# Patient Record
Sex: MALE | Race: WHITE | Employment: OTHER | URBAN - METROPOLITAN AREA
[De-identification: names, ages, dates, MRNs, and addresses within clinical notes are randomized per-mention and may not be internally consistent; named-entity substitution may affect disease eponyms.]

---

## 2017-09-21 ENCOUNTER — HOSPITAL ENCOUNTER (OUTPATIENT)
Dept: ULTRASOUND IMAGING | Age: 67
Discharge: HOME | End: 2017-09-21
Attending: INTERNAL MEDICINE
Payer: MEDICARE

## 2017-09-21 ENCOUNTER — HOSPITAL ENCOUNTER (OUTPATIENT)
Dept: CT IMAGING | Age: 67
Discharge: HOME | End: 2017-09-21
Attending: INTERNAL MEDICINE
Payer: MEDICARE

## 2017-09-21 DIAGNOSIS — Z87.891 HISTORY OF TOBACCO USE: ICD-10-CM

## 2017-09-21 PROCEDURE — 93978 VASCULAR STUDY: CPT

## 2017-09-21 PROCEDURE — G0297 LDCT FOR LUNG CA SCREEN: HCPCS

## 2017-10-09 ENCOUNTER — HOSPITAL ENCOUNTER (OUTPATIENT)
Dept: CT IMAGING | Age: 67
End: 2017-10-09
Attending: INTERNAL MEDICINE
Payer: MEDICARE

## 2017-10-09 ENCOUNTER — HOSPITAL ENCOUNTER (OUTPATIENT)
Dept: MRI IMAGING | Age: 67
Discharge: HOME | End: 2017-10-09
Attending: INTERNAL MEDICINE
Payer: MEDICARE

## 2017-10-09 DIAGNOSIS — K76.9 LIVER LESION: ICD-10-CM

## 2017-10-09 LAB — CREAT BLD-MCNC: 1.3 MG/DL (ref 0.8–1.5)

## 2017-10-09 PROCEDURE — 74011000258 HC RX REV CODE- 258

## 2017-10-09 PROCEDURE — 82565 ASSAY OF CREATININE: CPT

## 2017-10-09 PROCEDURE — 74183 MRI ABD W/O CNTR FLWD CNTR: CPT

## 2017-10-09 PROCEDURE — A9577 INJ MULTIHANCE: HCPCS

## 2017-10-09 PROCEDURE — 74011250636 HC RX REV CODE- 250/636

## 2017-10-09 RX ORDER — SODIUM CHLORIDE 0.9 % (FLUSH) 0.9 %
10 SYRINGE (ML) INJECTION
Status: COMPLETED | OUTPATIENT
Start: 2017-10-09 | End: 2017-10-09

## 2017-10-09 RX ADMIN — Medication 10 ML: at 08:09

## 2017-10-09 RX ADMIN — GADOBENATE DIMEGLUMINE 10 ML: 529 INJECTION, SOLUTION INTRAVENOUS at 08:08

## 2017-10-09 RX ADMIN — SODIUM CHLORIDE 100 ML: 900 INJECTION, SOLUTION INTRAVENOUS at 08:09

## 2017-12-27 PROBLEM — F33.9 RECURRENT DEPRESSION (HCC): Status: ACTIVE | Noted: 2017-12-27

## 2018-01-04 ENCOUNTER — HOSPITAL ENCOUNTER (OUTPATIENT)
Dept: SURGERY | Age: 68
Discharge: HOME | End: 2018-01-04

## 2018-01-05 VITALS — WEIGHT: 210 LBS | HEIGHT: 72 IN | BODY MASS INDEX: 28.44 KG/M2

## 2018-01-05 RX ORDER — ACETAMINOPHEN/DIPHENHYDRAMINE 500MG-25MG
1 TABLET ORAL
COMMUNITY
End: 2019-04-25 | Stop reason: ALTCHOICE

## 2018-01-05 NOTE — PERIOP NOTES
Patient verified name, , and surgery as listed in [facility]. Type 2 surgery, PAT phone assessment complete. Orders not received. Labs per surgeon: no orders. Labs per anesthesia protocol: Hgb and EKG. Pt instructed to come to entrance B on 17 at his convenience from [address] [place] [address] to have lab work drawn and an EKG. Pt is followed by Dr. Jeremy Hubbard MD for CAD. Last office note from 17 placed on chart for anesthesia reference if needed. Patient answered medical/surgical history questions at their best of ability. All prior to admission medications documented in [facility]. Patient instructed to take the following medications the day of surgery according to anesthesia guidelines with a small sip of water: none. Hold all vitamins 7 days prior to surgery and NSAIDS 5 days prior to surgery. Medications to be held- voltaren gel. Patient instructed on the following:  Arrive at A Entrance, time of arrival to be called the day before by 1700  NPO after midnight including gum, mints, and ice chips  Responsible adult must drive patient to the hospital, stay during surgery, and patient will need supervision 24 hours after anesthesia  Use antibacterial soap in shower the night before surgery and on the morning of surgery  Leave all valuables (money and jewelry) at home but bring insurance card and ID on  DOS  Do not wear make-up, nail polish, lotions, cologne, perfumes, powders, or oil on skin. Patient teach back successful and patient demonstrates knowledge of instruction.

## 2018-01-08 ENCOUNTER — HOSPITAL ENCOUNTER (OUTPATIENT)
Dept: SURGERY | Age: 68
Discharge: HOME | End: 2018-01-08
Attending: SURGERY
Payer: MEDICARE

## 2018-01-08 LAB
ATRIAL RATE: 76 BPM
CALCULATED P AXIS, ECG09: 67 DEGREES
CALCULATED R AXIS, ECG10: 72 DEGREES
CALCULATED T AXIS, ECG11: 46 DEGREES
DIAGNOSIS, 93000: NORMAL
GLUCOSE BLD STRIP.AUTO-MCNC: 86 MG/DL (ref 65–100)
HGB BLD-MCNC: 14.4 G/DL (ref 13.6–17.2)
P-R INTERVAL, ECG05: 138 MS
Q-T INTERVAL, ECG07: 376 MS
QRS DURATION, ECG06: 88 MS
QTC CALCULATION (BEZET), ECG08: 423 MS
VENTRICULAR RATE, ECG03: 76 BPM

## 2018-01-08 PROCEDURE — 93005 ELECTROCARDIOGRAM TRACING: CPT | Performed by: ANESTHESIOLOGY

## 2018-01-08 PROCEDURE — 85018 HEMOGLOBIN: CPT | Performed by: ANESTHESIOLOGY

## 2018-01-08 PROCEDURE — 82962 GLUCOSE BLOOD TEST: CPT

## 2018-01-10 ENCOUNTER — ANESTHESIA EVENT (OUTPATIENT)
Dept: SURGERY | Age: 68
End: 2018-01-10
Payer: MEDICARE

## 2018-01-10 RX ORDER — FENTANYL CITRATE 50 UG/ML
100 INJECTION, SOLUTION INTRAMUSCULAR; INTRAVENOUS ONCE
Status: CANCELLED | OUTPATIENT
Start: 2018-01-10 | End: 2018-01-10

## 2018-01-11 ENCOUNTER — HOSPITAL ENCOUNTER (OUTPATIENT)
Age: 68
Setting detail: OUTPATIENT SURGERY
Discharge: HOME | End: 2018-01-11
Attending: SURGERY | Admitting: SURGERY
Payer: MEDICARE

## 2018-01-11 ENCOUNTER — ANESTHESIA (OUTPATIENT)
Dept: SURGERY | Age: 68
End: 2018-01-11
Payer: MEDICARE

## 2018-01-11 VITALS
DIASTOLIC BLOOD PRESSURE: 55 MMHG | SYSTOLIC BLOOD PRESSURE: 110 MMHG | OXYGEN SATURATION: 95 % | HEIGHT: 72 IN | BODY MASS INDEX: 28.44 KG/M2 | WEIGHT: 210 LBS | RESPIRATION RATE: 16 BRPM | TEMPERATURE: 98.4 F | HEART RATE: 72 BPM

## 2018-01-11 DIAGNOSIS — K40.90 INGUINAL HERNIA OF LEFT SIDE WITHOUT OBSTRUCTION OR GANGRENE: Primary | ICD-10-CM

## 2018-01-11 LAB — GLUCOSE BLD STRIP.AUTO-MCNC: 122 MG/DL (ref 65–100)

## 2018-01-11 PROCEDURE — C1727 CATH, BAL TIS DIS, NON-VAS: HCPCS | Performed by: SURGERY

## 2018-01-11 PROCEDURE — 74011000250 HC RX REV CODE- 250

## 2018-01-11 PROCEDURE — 77030008477 HC STYL SATN SLP COVD -A: Performed by: ANESTHESIOLOGY

## 2018-01-11 PROCEDURE — 74011250636 HC RX REV CODE- 250/636

## 2018-01-11 PROCEDURE — 74011000250 HC RX REV CODE- 250: Performed by: SURGERY

## 2018-01-11 PROCEDURE — 74011250637 HC RX REV CODE- 250/637: Performed by: ANESTHESIOLOGY

## 2018-01-11 PROCEDURE — 77030021678 HC GLIDESCP STAT DISP VERT -B: Performed by: ANESTHESIOLOGY

## 2018-01-11 PROCEDURE — 77030021917 HC STPL TCKR ABSRB COVD -E: Performed by: SURGERY

## 2018-01-11 PROCEDURE — 82962 GLUCOSE BLOOD TEST: CPT

## 2018-01-11 PROCEDURE — 77030031139 HC SUT VCRL2 J&J -A: Performed by: SURGERY

## 2018-01-11 PROCEDURE — 77030035048 HC TRCR ENDOSC OPTCL COVD -B: Performed by: SURGERY

## 2018-01-11 PROCEDURE — 77030032490 HC SLV COMPR SCD KNE COVD -B: Performed by: SURGERY

## 2018-01-11 PROCEDURE — 74011250636 HC RX REV CODE- 250/636: Performed by: ANESTHESIOLOGY

## 2018-01-11 PROCEDURE — 76210000020 HC REC RM PH II FIRST 0.5 HR: Performed by: SURGERY

## 2018-01-11 PROCEDURE — 77030008522 HC TBNG INSUF LAPRO STRY -B: Performed by: SURGERY

## 2018-01-11 PROCEDURE — 77030035029 HC NDL INSUF VERES DISP COVD -B: Performed by: SURGERY

## 2018-01-11 PROCEDURE — 77030020782 HC GWN BAIR PAWS FLX 3M -B: Performed by: ANESTHESIOLOGY

## 2018-01-11 PROCEDURE — 77030011640 HC PAD GRND REM COVD -A: Performed by: SURGERY

## 2018-01-11 PROCEDURE — 74011000250 HC RX REV CODE- 250: Performed by: ANESTHESIOLOGY

## 2018-01-11 PROCEDURE — 74011250636 HC RX REV CODE- 250/636: Performed by: SURGERY

## 2018-01-11 PROCEDURE — 76210000016 HC OR PH I REC 1 TO 1.5 HR: Performed by: SURGERY

## 2018-01-11 PROCEDURE — 77030019908 HC STETH ESOPH SIMS -A: Performed by: ANESTHESIOLOGY

## 2018-01-11 PROCEDURE — 77030008703 HC TU ET UNCUF COVD -A: Performed by: ANESTHESIOLOGY

## 2018-01-11 PROCEDURE — 77030020399: Performed by: SURGERY

## 2018-01-11 PROCEDURE — 77030010507 HC ADH SKN DERMBND J&J -B: Performed by: SURGERY

## 2018-01-11 PROCEDURE — 76010000161 HC OR TIME 1 TO 1.5 HR INTENSV-TIER 1: Performed by: SURGERY

## 2018-01-11 PROCEDURE — C1781 MESH (IMPLANTABLE): HCPCS | Performed by: SURGERY

## 2018-01-11 PROCEDURE — 76060000033 HC ANESTHESIA 1 TO 1.5 HR: Performed by: SURGERY

## 2018-01-11 DEVICE — MESH HERN W3XL6IN INGUINAL POLYPR MFIL RECTANG: Type: IMPLANTABLE DEVICE | Site: GROIN | Status: FUNCTIONAL

## 2018-01-11 RX ORDER — NEOSTIGMINE METHYLSULFATE 1 MG/ML
INJECTION INTRAVENOUS AS NEEDED
Status: DISCONTINUED | OUTPATIENT
Start: 2018-01-11 | End: 2018-01-11 | Stop reason: HOSPADM

## 2018-01-11 RX ORDER — HYDROCODONE BITARTRATE AND ACETAMINOPHEN 5; 325 MG/1; MG/1
2 TABLET ORAL AS NEEDED
Status: DISCONTINUED | OUTPATIENT
Start: 2018-01-11 | End: 2018-01-11 | Stop reason: HOSPADM

## 2018-01-11 RX ORDER — EPHEDRINE SULFATE 50 MG/ML
INJECTION, SOLUTION INTRAVENOUS AS NEEDED
Status: DISCONTINUED | OUTPATIENT
Start: 2018-01-11 | End: 2018-01-11 | Stop reason: HOSPADM

## 2018-01-11 RX ORDER — BUPIVACAINE HYDROCHLORIDE AND EPINEPHRINE 2.5; 5 MG/ML; UG/ML
INJECTION, SOLUTION EPIDURAL; INFILTRATION; INTRACAUDAL; PERINEURAL AS NEEDED
Status: DISCONTINUED | OUTPATIENT
Start: 2018-01-11 | End: 2018-01-11 | Stop reason: HOSPADM

## 2018-01-11 RX ORDER — OXYCODONE HYDROCHLORIDE 5 MG/1
5 TABLET ORAL
Qty: 15 TAB | Refills: 0 | Status: SHIPPED | OUTPATIENT
Start: 2018-01-11 | End: 2018-03-05 | Stop reason: ALTCHOICE

## 2018-01-11 RX ORDER — GLYCOPYRROLATE 0.2 MG/ML
INJECTION INTRAMUSCULAR; INTRAVENOUS AS NEEDED
Status: DISCONTINUED | OUTPATIENT
Start: 2018-01-11 | End: 2018-01-11 | Stop reason: HOSPADM

## 2018-01-11 RX ORDER — MIDAZOLAM HYDROCHLORIDE 1 MG/ML
2 INJECTION, SOLUTION INTRAMUSCULAR; INTRAVENOUS
Status: DISCONTINUED | OUTPATIENT
Start: 2018-01-11 | End: 2018-01-11 | Stop reason: HOSPADM

## 2018-01-11 RX ORDER — LIDOCAINE HYDROCHLORIDE 10 MG/ML
0.1 INJECTION INFILTRATION; PERINEURAL AS NEEDED
Status: DISCONTINUED | OUTPATIENT
Start: 2018-01-11 | End: 2018-01-11 | Stop reason: HOSPADM

## 2018-01-11 RX ORDER — MIDAZOLAM HYDROCHLORIDE 1 MG/ML
5 INJECTION, SOLUTION INTRAMUSCULAR; INTRAVENOUS ONCE
Status: DISCONTINUED | OUTPATIENT
Start: 2018-01-11 | End: 2018-01-11 | Stop reason: HOSPADM

## 2018-01-11 RX ORDER — FAMOTIDINE 20 MG/1
20 TABLET, FILM COATED ORAL ONCE
Status: COMPLETED | OUTPATIENT
Start: 2018-01-11 | End: 2018-01-11

## 2018-01-11 RX ORDER — FENTANYL CITRATE 50 UG/ML
INJECTION, SOLUTION INTRAMUSCULAR; INTRAVENOUS AS NEEDED
Status: DISCONTINUED | OUTPATIENT
Start: 2018-01-11 | End: 2018-01-11 | Stop reason: HOSPADM

## 2018-01-11 RX ORDER — DEXAMETHASONE SODIUM PHOSPHATE 4 MG/ML
INJECTION, SOLUTION INTRA-ARTICULAR; INTRALESIONAL; INTRAMUSCULAR; INTRAVENOUS; SOFT TISSUE AS NEEDED
Status: DISCONTINUED | OUTPATIENT
Start: 2018-01-11 | End: 2018-01-11 | Stop reason: HOSPADM

## 2018-01-11 RX ORDER — CEFAZOLIN SODIUM/WATER 2 G/20 ML
2 SYRINGE (ML) INTRAVENOUS ONCE
Status: COMPLETED | OUTPATIENT
Start: 2018-01-11 | End: 2018-01-11

## 2018-01-11 RX ORDER — PROPOFOL 10 MG/ML
INJECTION, EMULSION INTRAVENOUS AS NEEDED
Status: DISCONTINUED | OUTPATIENT
Start: 2018-01-11 | End: 2018-01-11 | Stop reason: HOSPADM

## 2018-01-11 RX ORDER — HEPARIN SODIUM 5000 [USP'U]/ML
5000 INJECTION, SOLUTION INTRAVENOUS; SUBCUTANEOUS ONCE
Status: COMPLETED | OUTPATIENT
Start: 2018-01-11 | End: 2018-01-11

## 2018-01-11 RX ORDER — LIDOCAINE HYDROCHLORIDE 20 MG/ML
INJECTION, SOLUTION EPIDURAL; INFILTRATION; INTRACAUDAL; PERINEURAL AS NEEDED
Status: DISCONTINUED | OUTPATIENT
Start: 2018-01-11 | End: 2018-01-11 | Stop reason: HOSPADM

## 2018-01-11 RX ORDER — SODIUM CHLORIDE, SODIUM LACTATE, POTASSIUM CHLORIDE, CALCIUM CHLORIDE 600; 310; 30; 20 MG/100ML; MG/100ML; MG/100ML; MG/100ML
75 INJECTION, SOLUTION INTRAVENOUS CONTINUOUS
Status: DISCONTINUED | OUTPATIENT
Start: 2018-01-11 | End: 2018-01-11 | Stop reason: HOSPADM

## 2018-01-11 RX ORDER — KETOROLAC TROMETHAMINE 30 MG/ML
INJECTION, SOLUTION INTRAMUSCULAR; INTRAVENOUS AS NEEDED
Status: DISCONTINUED | OUTPATIENT
Start: 2018-01-11 | End: 2018-01-11 | Stop reason: HOSPADM

## 2018-01-11 RX ORDER — ROCURONIUM BROMIDE 10 MG/ML
INJECTION, SOLUTION INTRAVENOUS AS NEEDED
Status: DISCONTINUED | OUTPATIENT
Start: 2018-01-11 | End: 2018-01-11 | Stop reason: HOSPADM

## 2018-01-11 RX ORDER — HYDROMORPHONE HYDROCHLORIDE 2 MG/ML
0.5 INJECTION, SOLUTION INTRAMUSCULAR; INTRAVENOUS; SUBCUTANEOUS
Status: DISCONTINUED | OUTPATIENT
Start: 2018-01-11 | End: 2018-01-11 | Stop reason: HOSPADM

## 2018-01-11 RX ORDER — OXYCODONE HYDROCHLORIDE 5 MG/1
5 TABLET ORAL
Status: DISCONTINUED | OUTPATIENT
Start: 2018-01-11 | End: 2018-01-11 | Stop reason: HOSPADM

## 2018-01-11 RX ADMIN — PROPOFOL 150 MG: 10 INJECTION, EMULSION INTRAVENOUS at 12:38

## 2018-01-11 RX ADMIN — EPHEDRINE SULFATE 5 MG: 50 INJECTION, SOLUTION INTRAVENOUS at 12:59

## 2018-01-11 RX ADMIN — SODIUM CHLORIDE, SODIUM LACTATE, POTASSIUM CHLORIDE, AND CALCIUM CHLORIDE 75 ML/HR: 600; 310; 30; 20 INJECTION, SOLUTION INTRAVENOUS at 11:11

## 2018-01-11 RX ADMIN — KETOROLAC TROMETHAMINE 30 MG: 30 INJECTION, SOLUTION INTRAMUSCULAR; INTRAVENOUS at 13:28

## 2018-01-11 RX ADMIN — NEOSTIGMINE METHYLSULFATE 3 MG: 1 INJECTION INTRAVENOUS at 13:37

## 2018-01-11 RX ADMIN — HEPARIN SODIUM 5000 UNITS: 5000 INJECTION, SOLUTION INTRAVENOUS; SUBCUTANEOUS at 11:13

## 2018-01-11 RX ADMIN — ROCURONIUM BROMIDE 10 MG: 10 INJECTION, SOLUTION INTRAVENOUS at 13:06

## 2018-01-11 RX ADMIN — LIDOCAINE HYDROCHLORIDE 70 MG: 20 INJECTION, SOLUTION EPIDURAL; INFILTRATION; INTRACAUDAL; PERINEURAL at 12:38

## 2018-01-11 RX ADMIN — SODIUM CHLORIDE, SODIUM LACTATE, POTASSIUM CHLORIDE, AND CALCIUM CHLORIDE: 600; 310; 30; 20 INJECTION, SOLUTION INTRAVENOUS at 13:04

## 2018-01-11 RX ADMIN — EPHEDRINE SULFATE 10 MG: 50 INJECTION, SOLUTION INTRAVENOUS at 12:53

## 2018-01-11 RX ADMIN — DEXAMETHASONE SODIUM PHOSPHATE 10 MG: 4 INJECTION, SOLUTION INTRA-ARTICULAR; INTRALESIONAL; INTRAMUSCULAR; INTRAVENOUS; SOFT TISSUE at 12:58

## 2018-01-11 RX ADMIN — ROCURONIUM BROMIDE 30 MG: 10 INJECTION, SOLUTION INTRAVENOUS at 12:38

## 2018-01-11 RX ADMIN — MIDAZOLAM HYDROCHLORIDE 2 MG: 1 INJECTION, SOLUTION INTRAMUSCULAR; INTRAVENOUS at 11:53

## 2018-01-11 RX ADMIN — GLYCOPYRROLATE 0.4 MG: 0.2 INJECTION INTRAMUSCULAR; INTRAVENOUS at 13:37

## 2018-01-11 RX ADMIN — FENTANYL CITRATE 100 MCG: 50 INJECTION, SOLUTION INTRAMUSCULAR; INTRAVENOUS at 12:38

## 2018-01-11 RX ADMIN — FAMOTIDINE 20 MG: 20 TABLET, FILM COATED ORAL at 11:12

## 2018-01-11 RX ADMIN — LIDOCAINE HYDROCHLORIDE 0.1 ML: 10 INJECTION, SOLUTION INFILTRATION; PERINEURAL at 11:11

## 2018-01-11 RX ADMIN — Medication 2 G: at 12:32

## 2018-01-11 NOTE — ANESTHESIA PREPROCEDURE EVALUATION
Anesthetic History   No history of anesthetic complications            Review of Systems / Medical History  Nursing notes reviewed and pertinent labs reviewed    Pulmonary          Smoker         Neuro/Psych   Within defined limits           Cardiovascular    Hypertension: well controlled          Past MI, CAD, cardiac stents (last placed in 1999) and hyperlipidemia    Exercise tolerance: >4 METS  Comments: Denies recent CP, SOB or Palpitations   GI/Hepatic/Renal  Within defined limits              Endo/Other    Diabetes: well controlled, type 2         Other Findings   Comments: S/p cervical fusion         Physical Exam    Airway  Mallampati: III  TM Distance: 4 - 6 cm  Neck ROM: decreased range of motion   Mouth opening: Normal    Comments: Extremely limited neck extension Cardiovascular  Regular rate and rhythm,  S1 and S2 normal,  no murmur, click, rub, or gallop             Dental  No notable dental hx       Pulmonary  Breath sounds clear to auscultation               Abdominal  GI exam deferred       Other Findings            Anesthetic Plan    ASA: 3  Anesthesia type: general          Induction: Intravenous  Anesthetic plan and risks discussed with: Patient and Spouse      Glidescope for intubation due to limited neck extension.

## 2018-01-11 NOTE — ANESTHESIA POSTPROCEDURE EVALUATION
Post-Anesthesia Evaluation and Assessment    Patient: Heidi Aguilar MRN: [id number]  SSN: [id number]    YOB: 1950  Age: 78 y.o. Sex: male       Cardiovascular Function/Vital Signs  Visit Vitals    /61    Pulse 65    Temp 36.9 °C (98.4 °F)    Resp 16    Ht 6' (1.829 m)    Wt 95.3 kg (210 lb)    SpO2 95%    BMI 28.48 kg/m2       Patient is status post general anesthesia for Procedure(s):  LEFT HERNIA INGUINAL REPAIR LAPAROSCOPIC with MESH. Nausea/Vomiting: None    Postoperative hydration reviewed and adequate. Pain:  Pain Scale 1: Numeric (0 - 10) (01/11/18 1433)  Pain Intensity 1: 0 (01/11/18 1433)   Managed    Neurological Status:   Neuro (WDL): Exceptions to WDL (01/11/18 1433)  Neuro  Neurologic State: Drowsy (01/11/18 1433)  Orientation Level: Oriented X4 (01/11/18 1433)  Cognition: Follows commands (01/11/18 1433)  Speech: Clear (01/11/18 1433)  LUE Motor Response: Purposeful (01/11/18 1433)  LLE Motor Response: Purposeful (01/11/18 1433)  RUE Motor Response: Purposeful (01/11/18 1433)  RLE Motor Response: Purposeful (01/11/18 1433)   At baseline    Mental Status and Level of Consciousness: Arousable    Pulmonary Status:   O2 Device: Room air (01/11/18 1433)   Adequate oxygenation and airway patent    Complications related to anesthesia: None    Post-anesthesia assessment completed.  No concerns    Signed By: Erica Thomas MD     January 11, 2018

## 2018-01-11 NOTE — DISCHARGE INSTRUCTIONS
POST OP SURGERY INSTRUCTIONS     You should walk about 3 to 4 times per day for approximately 10 minutes each time. When comfortable you can go for longer walks outside with a friend or family member as early as the day after you get home.  No heavy lifting over 20 pounds until you see me back in clinic. You should try liquids for your first meal and if tolerated you can advance to regular food for the next. NO driving for 4 days and until you are off narcotics altogether for 24 hours. Brace your incision area with your hand or a pillow firmly to cough. Consider sleeping in a recliner with a handle to help you sit up and get up on your own if you don't have someone readily available to assist you in and out of bed for the first day or longer if you wish. Constipation can be an issue after surgery due to the need for narcotics during the surgery and pain control at home. If you do not have a bowel movement within 24 hours after going home I strongly recommend that you use a bowel stimulant such as milk of magnesia or miralax once a day until you go.  Constipation can result in a early return to my office or the Emergency Room for abdominal discomfort.       Call with fevers greater than 101.0 or increasing redness or drainage from your wound.       Use an ice bag over your most tender incision or incisions to numb the area along with pain medications prescribed to decrease discomfort.  I recommend leaving the ice bag on all night the first night and only removing it to add more ice as it melts.       In the unlikely event that you experience chest pain or continued shortness of breath you should call the Emergency Room or if you feel appropriate 911. Please call my office with any routine questions or concerns.  I will see back in 2 weeks or sooner if necessary.      Thank you for choosing [facility] and I look forward to seeing you through to recovery.         Sincerely, Sachin Angulo MD., Tung Maynard MD Acknowledge New                Hernia Repair: What to Expect at [address] [place] [address] are likely to have pain for the next few days. You may also feel like you have the flu, and you may have a low fever and feel tired and nauseated. This is common. You should feel better after a few days and will probably feel much better in 7 days. For several weeks you may feel twinges or pulling in the hernia repair when you move. You may have some bruising on the scrotum and along the penis. This is normal. Men will need to wear a jockstrap or briefs, not boxers, for scrotal support for several days after a groin (inguinal) hernia repair. 99.codex bicycle shorts may provide good support. This care sheet gives you a general idea about how long it will take for you to recover. But each person recovers at a different pace. Follow the steps below to get better as quickly as possible. How can you care for yourself at home? Activity  ? · Rest when you feel tired. Getting enough sleep will help you recover. ? · Try to walk each day. Start by walking a little more than you did the day before. Bit by bit, increase the amount you walk. Walking boosts blood flow and helps prevent pneumonia and constipation. ? · Avoid strenuous activities, such as biking, jogging, weight lifting, or aerobic exercise, until your doctor says it is okay. ? · Avoid lifting anything that would make you strain. This may include heavy grocery bags and milk containers, a heavy briefcase or backpack, cat litter or dog food bags, a vacuum , or a child. ? · You may drive when you are no longer taking pain medicine and can quickly move your foot from the gas pedal to the brake. You must also be able to sit comfortably for a long period of time, even if you do not plan to go far. You might get caught in traffic. ? · Most people are able to return to work within 1 to 2 weeks after surgery. ? · You may shower 24 to 48 hours after surgery, if your doctor okays it. Pat the cut (incision) dry. Do not take a bath for the first 2 weeks, or until your doctor tells you it is okay. ? · Your doctor will tell you when you can have sex again. Diet  ? · You can eat your normal diet. If your stomach is upset, try bland, low-fat foods like plain rice, broiled chicken, toast, and yogurt. ? · Drink plenty of fluids (unless your doctor tells you not to). ? · You may notice that your bowel movements are not regular right after your surgery. This is common. Avoid constipation and straining with bowel movements. You may want to take a fiber supplement every day. If you have not had a bowel movement after a couple of days, ask your doctor about taking a mild laxative. Medicines  ? · Your doctor will tell you if and when you can restart your medicines. He or she will also give you instructions about taking any new medicines. ? · If you take blood thinners, such as warfarin (Coumadin), clopidogrel (Plavix), or aspirin, be sure to talk to your doctor. He or she will tell you if and when to start taking those medicines again. Make sure that you understand exactly what your doctor wants you to do. ? · Be safe with medicines. Take pain medicines exactly as directed. ¨ If the doctor gave you a prescription medicine for pain, take it as prescribed. ¨ If you are not taking a prescription pain medicine, take an over-the-counter medicine such as acetaminophen (Tylenol), ibuprofen (Advil, Motrin), or naproxen (Aleve). Read and follow all instructions on the label. ¨ Do not take two or more pain medicines at the same time unless the doctor told you to. Many pain medicines have acetaminophen, which is Tylenol. Too much acetaminophen (Tylenol) can be harmful. ? · If your doctor prescribed antibiotics, take them as directed. Do not stop taking them just because you feel better.  You need to take the full course of antibiotics. ? · If you think your pain medicine is making you sick to your stomach:  ¨ Take your medicine after meals (unless your doctor has told you not to). ¨ Ask your doctor for a different pain medicine. Incision care  ? · If you have strips of tape on the cut (incision) the doctor made, leave the tape on for a week or until it falls off.   ? · If you have staples closing the cut, you will need to visit your doctor in 1 to 2 weeks to have them removed. ? · Wash the area daily with warm, soapy water and pat it dry. Follow-up care is a key part of your treatment and safety. Be sure to make and go to all appointments, and call your doctor if you are having problems. It's also a good idea to know your test results and keep a list of the medicines you take. When should you call for help? Call 911 anytime you think you may need emergency care. For example, call if:  ? · You passed out (lost consciousness). ? · You are short of breath. ?Call your doctor now or seek immediate medical care if:  ? · You have pain that does not get better after you take pain medicine. ? · You are sick to your stomach and cannot keep fluids down. ? · You have signs of a blood clot in your leg (called a deep vein thrombosis), such as:  ¨ Pain in your calf, back of the knee, thigh, or groin. ¨ Redness and swelling in your leg or groin. ? · You cannot pass stools or gas. ? · Bright red blood has soaked through the bandage over your incision. ? · You have loose stitches, or your incision comes open. ? · You have signs of infection, such as:  ¨ Increased pain, swelling, warmth, or redness. ¨ Red streaks leading from the incision. ¨ Pus draining from the incision. ¨ A fever. ? Watch closely for any changes in your health, and be sure to contact your doctor if you have any problems. Where can you learn more? Go to http://марина-chuck.info/.   Enter P917 in the search box to learn more about \"Hernia Repair: What to Expect at Home. \"  Current as of: May 12, 2017  Content Version: 11.4  © 5403-6781 [employer]. Care instructions adapted under license by [employer] (which disclaims liability or warranty for this information). If you have questions about a medical condition or this instruction, always ask your healthcare professional. [address] any warranty or liability for your use of this information.

## 2018-01-11 NOTE — IP AVS SNAPSHOT
Erik aVldez 
 
 
 [address] [facility] [address] [phone] Patient: Mireille Treadwell MRN: [id number]:[id number] About your hospitalization You were admitted on:  January 11, 2018 You last received care in the:  [facility] PACU You were discharged on:  January 11, 2018 Why you were hospitalized Your primary diagnosis was:  Not on File Follow-up Information Follow up With Details Comments Contact Info Liliana Schulte MD   [address] [address] 
[phone] Melita Hong MD   [address] [facility] [address] [place] [address] [address] 
[phone] Your Scheduled Appointments Tuesday January 23, 2018  2:20 PM EST Global Post Op with Melita Hong MD  
[place] [facility] ([address] [facility]) [address] [place] [id number]  
[phone] Monday March 05, 2018  8:30 AM EST  
(Arrive by 8:15 AM) Office Visit with Liliana Schulte MD  
[address]) [address] [address] [phone] Discharge Orders None A check cyndy indicates which time of day the medication should be taken. My Medications START taking these medications Instructions Each Dose to Equal  
 Morning Noon Evening Bedtime  
 oxyCODONE IR 5 mg immediate release tablet Commonly known as:  Ronnie Stoner Your last dose was: Your next dose is: Take 1 Tab by mouth every four (4) hours as needed. Max Daily Amount: 30 mg.  
 5 mg CHANGE how you take these medications Instructions Each Dose to Equal  
 Morning Noon Evening Bedtime  
 amLODIPine 10 mg tablet Commonly known as:  Charline Calles What changed:  when to take this Your last dose was: Your next dose is: Take 1 Tab by mouth daily.   
 10 mg  
    
 Azelastine 0.15 % (205.5 mcg) nasal spray Commonly known as:  ASTEPRO What changed:   
- when to take this 
- reasons to take this Your last dose was: Your next dose is: 2 Sprays by Both Nostrils route two (2) times a day. 2 Spray  
    
   
   
   
  
 citalopram 20 mg tablet Commonly known as:  Jenny Jason What changed:  when to take this Your last dose was: Your next dose is: Take 1 Tab by mouth daily. 20 mg  
    
   
   
   
  
 diclofenac 1 % Gel Commonly known as:  VOLTAREN What changed:   
- when to take this 
- reasons to take this Your last dose was: Your next dose is:    
   
   
 Apply 4 g to affected area four (4) times daily. 4 g  
    
   
   
   
  
 fenofibrate 160 mg tablet Commonly known as:  LOFIBRA What changed:  when to take this Your last dose was: Your next dose is: Take 1 Tab by mouth daily. 160 mg  
    
   
   
   
  
 fluticasone 50 mcg/actuation nasal spray Commonly known as:  Kell Gilbert What changed:   
- when to take this 
- reasons to take this Your last dose was: Your next dose is: 2 Sprays by Both Nostrils route daily. 2 Spray  
    
   
   
   
  
 lisinopril 20 mg tablet Commonly known as:  Jeromy Martini What changed:  when to take this Your last dose was: Your next dose is: Take 1 Tab by mouth daily. 20 mg  
    
   
   
   
  
 tamsulosin 0.4 mg capsule Commonly known as:  FLOMAX What changed:  when to take this Your last dose was: Your next dose is: Take 1 Cap by mouth daily. 0.4 mg  
    
   
   
   
  
  
CONTINUE taking these medications Instructions Each Dose to Equal  
 Morning Noon Evening Bedtime  
 aspirin delayed-release 81 mg tablet Your last dose was: Your next dose is: Take 81 mg by mouth nightly. 81 mg  
    
   
   
   
  
 atorvastatin 80 mg tablet Commonly known as:  LIPITOR Your last dose was: Your next dose is: Take 1 Tab by mouth nightly. 80 mg CENTRUM SILVER -600-300 mcg Tab Generic drug:  multivit-min-FA-lycopen-lutein Your last dose was: Your next dose is: Take 1 Tab by mouth nightly. 1 Tab TYLENOL PM EXTRA STRENGTH  mg Tab Generic drug:  diphenhydrAMINE-acetaminophen Your last dose was: Your next dose is: Take 1 Tab by mouth nightly as needed. 1 Tab Where to Get Your Medications Information on where to get these meds will be given to you by the nurse or doctor. ! Ask your nurse or doctor about these medications  
  oxyCODONE IR 5 mg immediate release tablet Discharge Instructions POST OP SURGERY INSTRUCTIONS You should walk about 3 to 4 times per day for approximately 10 minutes each time. When comfortable you can go for longer walks outside with a friend or family member as early as the day after you get home.  No heavy lifting over 20 pounds until you see me back in clinic. You should try liquids for your first meal and if tolerated you can advance to regular food for the next. NO driving for 4 days and until you are off narcotics altogether for 24 hours. Brace your incision area with your hand or a pillow firmly to cough. Consider sleeping in a recliner with a handle to help you sit up and get up on your own if you don't have someone readily available to assist you in and out of bed for the first day or longer if you wish. Constipation can be an issue after surgery due to the need for narcotics during the surgery and pain control at home.   
If you do not have a bowel movement within 24 hours after going home I strongly recommend that you use a bowel stimulant such as milk of magnesia or miralax once a day until you go.  Constipation can result in a early return to my office or the Emergency Room for abdominal discomfort.    
 
Call with fevers greater than 101.0 or increasing redness or drainage from your wound.    
 
Use an ice bag over your most tender incision or incisions to numb the area along with pain medications prescribed to decrease discomfort.  I recommend leaving the ice bag on all night the first night and only removing it to add more ice as it melts.    
 
In the unlikely event that you experience chest pain or continued shortness of breath you should call the Emergency Room or if you feel appropriate 911. Please call my office with any routine questions or concerns.  I will see back in 2 weeks or sooner if necessary. Thank you for choosing [facility] and I look forward to seeing you through to recovery.    
 
 
Sincerely,  
 
Amanda Gilliland MD., FACS Shae Schaefer MD Acknowledge New  
  Hernia Repair: What to Expect at Home Your Recovery You are likely to have pain for the next few days. You may also feel like you have the flu, and you may have a low fever and feel tired and nauseated. This is common. You should feel better after a few days and will probably feel much better in 7 days. For several weeks you may feel twinges or pulling in the hernia repair when you move. You may have some bruising on the scrotum and along the penis. This is normal. Men will need to wear a jockstrap or briefs, not boxers, for scrotal support for several days after a groin (inguinal) hernia repair. Touchtalentdex bicycle shorts may provide good support. This care sheet gives you a general idea about how long it will take for you to recover. But each person recovers at a different pace. Follow the steps below to get better as quickly as possible. How can you care for yourself at home? Activity ? · Rest when you feel tired. Getting enough sleep will help you recover. ? · Try to walk each day. Start by walking a little more than you did the day before. Bit by bit, increase the amount you walk. Walking boosts blood flow and helps prevent pneumonia and constipation. ? · Avoid strenuous activities, such as biking, jogging, weight lifting, or aerobic exercise, until your doctor says it is okay. ? · Avoid lifting anything that would make you strain. This may include heavy grocery bags and milk containers, a heavy briefcase or backpack, cat litter or dog food bags, a vacuum , or a child. ? · You may drive when you are no longer taking pain medicine and can quickly move your foot from the gas pedal to the brake. You must also be able to sit comfortably for a long period of time, even if you do not plan to go far. You might get caught in traffic. ? · Most people are able to return to work within 1 to 2 weeks after surgery. ? · You may shower 24 to 48 hours after surgery, if your doctor okays it. Pat the cut (incision) dry. Do not take a bath for the first 2 weeks, or until your doctor tells you it is okay. ? · Your doctor will tell you when you can have sex again. Diet ? · You can eat your normal diet. If your stomach is upset, try bland, low-fat foods like plain rice, broiled chicken, toast, and yogurt. ? · Drink plenty of fluids (unless your doctor tells you not to). ? · You may notice that your bowel movements are not regular right after your surgery. This is common. Avoid constipation and straining with bowel movements. You may want to take a fiber supplement every day. If you have not had a bowel movement after a couple of days, ask your doctor about taking a mild laxative. Medicines ? · Your doctor will tell you if and when you can restart your medicines. He or she will also give you instructions about taking any new medicines. ? · If you take blood thinners, such as warfarin (Coumadin), clopidogrel (Plavix), or aspirin, be sure to talk to your doctor. He or she will tell you if and when to start taking those medicines again. Make sure that you understand exactly what your doctor wants you to do. ? · Be safe with medicines. Take pain medicines exactly as directed. ¨ If the doctor gave you a prescription medicine for pain, take it as prescribed. ¨ If you are not taking a prescription pain medicine, take an over-the-counter medicine such as acetaminophen (Tylenol), ibuprofen (Advil, Motrin), or naproxen (Aleve). Read and follow all instructions on the label. ¨ Do not take two or more pain medicines at the same time unless the doctor told you to. Many pain medicines have acetaminophen, which is Tylenol. Too much acetaminophen (Tylenol) can be harmful. ? · If your doctor prescribed antibiotics, take them as directed. Do not stop taking them just because you feel better. You need to take the full course of antibiotics. ? · If you think your pain medicine is making you sick to your stomach: 
¨ Take your medicine after meals (unless your doctor has told you not to). ¨ Ask your doctor for a different pain medicine. Incision care ? · If you have strips of tape on the cut (incision) the doctor made, leave the tape on for a week or until it falls off.  
? · If you have staples closing the cut, you will need to visit your doctor in 1 to 2 weeks to have them removed. ? · Wash the area daily with warm, soapy water and pat it dry. Follow-up care is a key part of your treatment and safety. Be sure to make and go to all appointments, and call your doctor if you are having problems. It's also a good idea to know your test results and keep a list of the medicines you take. When should you call for help? Call 911 anytime you think you may need emergency care. For example, call if: 
? · You passed out (lost consciousness). ? · You are short of breath. ?Call your doctor now or seek immediate medical care if: 
? · You have pain that does not get better after you take pain medicine. ? · You are sick to your stomach and cannot keep fluids down. ? · You have signs of a blood clot in your leg (called a deep vein thrombosis), such as: 
¨ Pain in your calf, back of the knee, thigh, or groin. ¨ Redness and swelling in your leg or groin. ? · You cannot pass stools or gas. ? · Bright red blood has soaked through the bandage over your incision. ? · You have loose stitches, or your incision comes open. ? · You have signs of infection, such as: 
¨ Increased pain, swelling, warmth, or redness. ¨ Red streaks leading from the incision. ¨ Pus draining from the incision. ¨ A fever. ? Watch closely for any changes in your health, and be sure to contact your doctor if you have any problems. Where can you learn more? Go to http://марина-chuck.info/. Enter E449 in the search box to learn more about \"Hernia Repair: What to Expect at Home. \" Current as of: May 12, 2017 Content Version: 11.4 © 4405-1594 [employer]. Care instructions adapted under license by [employer] (which disclaims liability or warranty for this information). If you have questions about a medical condition or this instruction, always ask your healthcare professional. [place] [address] any warranty or liability for your use of this information. ACO Transitions of Care Introducing [employer] [address] Katerine Landeros offers a voluntary care coordination program to provide high quality service and care to [facility] fee-for-service beneficiaries. Selene Paulino was designed to help you enhance your health and well-being through the following services: ? Transitions of Care  support for individuals who are transitioning from one care setting to another (example: Hospital to home). ? Chronic and Complex Care Coordination  support for individuals and caregivers of those with serious or chronic illnesses or with more than one chronic (ongoing) condition and those who take a number of different medications. If you meet specific medical criteria, a [facility] may call you directly to coordinate your care with your primary care physician and your other care providers. For questions about the [facility] programs, please, contact your physicians office. For general questions or additional information about Accountable Care Organizations: 
Please visit www.medicare.gov/acos. html or call 1-800-MEDICARE (5-604.438.6491) TTY users should call 2-745.166.8582. Introducing [facility]! Dear Sheyla Younger: Thank you for requesting a Trendslide account. Our records indicate that you already have an active Trendslide account. You can access your account anytime at [email or web address] Did you know that you can access your hospital and ER discharge instructions at any time in Trendslide? You can also review all of your test results from your hospital stay or ER visit. Additional Information If you have questions, please visit the Frequently Asked Questions section of the Trendslide website at [email or web address] Remember, Trendslide is NOT to be used for urgent needs. For medical emergencies, dial 911. Now available from your iPhone and Android! Providers Seen During Your Hospitalization Provider Specialty Primary office phone Soot Bojorquez MD General Surgery [phone] Your Primary Care Physician (PCP) Primary Care Physician Office Phone Office Fax Katelyn Stover [phone] You are allergic to the following No active allergies Recent Documentation Height Weight BMI Smoking Status 1.829 m 95.3 kg 28.48 kg/m2 Current Every Day Smoker Emergency Contacts Name Discharge Info Relation Home Work Mobile Niki Mendez DISCHARGE CAREGIVER [3] Spouse [3] [phone] Patient Belongings The following personal items are in your possession at time of discharge: 
  Dental Appliances: Partials, At home, Uppers             Jewelry: None Please provide this summary of care documentation to your next provider. Signatures-by signing, you are acknowledging that this After Visit Summary has been reviewed with you and you have received a copy. Patient Signature:  ____________________________________________________________ Date:  ____________________________________________________________  
  
[facility] Provider Signature:  ____________________________________________________________ Date:  ____________________________________________________________
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
## 2018-03-05 PROBLEM — F33.9 RECURRENT DEPRESSION (HCC): Status: RESOLVED | Noted: 2017-12-27 | Resolved: 2018-03-05

## 2018-03-21 ENCOUNTER — HOSPITAL ENCOUNTER (OUTPATIENT)
Dept: CT IMAGING | Age: 68
Discharge: HOME | End: 2018-03-21
Attending: INTERNAL MEDICINE
Payer: MEDICARE

## 2018-03-21 DIAGNOSIS — R91.1 LUNG NODULE: ICD-10-CM

## 2018-03-21 PROCEDURE — 71250 CT THORAX DX C-: CPT

## 2018-08-07 ENCOUNTER — HOSPITAL ENCOUNTER (OUTPATIENT)
Dept: LAB | Age: 68
Discharge: HOME | End: 2018-08-07

## 2018-08-07 PROCEDURE — 88305 TISSUE EXAM BY PATHOLOGIST: CPT

## 2018-10-17 PROBLEM — E11.21 TYPE 2 DIABETES WITH NEPHROPATHY (HCC): Status: ACTIVE | Noted: 2018-10-17

## 2019-02-19 ENCOUNTER — HOSPITAL ENCOUNTER (OUTPATIENT)
Dept: PHYSICAL THERAPY | Age: 69
Discharge: HOME | End: 2019-02-19
Payer: MEDICARE

## 2019-02-19 PROCEDURE — 97035 APP MDLTY 1+ULTRASOUND EA 15: CPT

## 2019-02-19 PROCEDURE — 97140 MANUAL THERAPY 1/> REGIONS: CPT

## 2019-02-19 PROCEDURE — 97162 PT EVAL MOD COMPLEX 30 MIN: CPT

## 2019-02-19 NOTE — PROGRESS NOTES
Yoni Dawn : 1950 [place] Secondary: Maninder Mckee 76 [address], [place], [address] [place] [address] [phone]   [phone] OUTPATIENT PHYSICAL THERAPY:Initial Assessment 2019 ICD-10: Treatment Diagnosis: M54.5 low back pain and M51.36 other intervertebral disc degeneration, lumbar region and M54.16 radiculopathy , lumbar region and R26.89 other abnormalities of gait and mobility Precautions: FALLS PRECAUTIONS-VERY WEAK QUADS -INCREASED PAIN WITH TRUNK FLEXION/COLD MAKES PAIN WORSE Allergies: Patient has no known allergies. MD Orders: evaluate and treat  MEDICAL/REFERRING DIAGNOSIS: 
Other intervertebral disc degeneration, lumbar region [M51.36] Radiculopathy, lumbar region [M54.16] Low back pain [M54.5] DATE OF ONSET: on and off x 6-7 years -worse the last year REFERRING PHYSICIAN: Qing Charles PA 
RETURN PHYSICIAN APPOINTMENT:19 INITIAL ASSESSMENT:  Mr. Brittany Salazar is a 80 y.o. male presenting to physical therapy with complaints of insidious B low back pain with radicular symptoms down L leg to L foot with incidences of L leg going numb particularly after long standing  -patient ambulates independently with minimal antalgic gait -rounded shoulders with forward head on neck posture and flattened lumbar curve-posteriorly rotated L innominant is noted-very tight B lumbar paraspinals with increased tightness on R side-point tender at L L4-S1 region -tight gluteus medius and piriformis musculature  - -decreased trunk mobility with increased pain in flexion/extension/B sidebending -pain level is 5/10 but can get as high as 7-8/10-history of fallen secondary to weak/numb L LE per patient -- very weak quads and hamstrings with increased pain/cramping with muscle testing-recent history of xrays and mri per patient  (mri performed on 19 per patient ) -very good candidate for skilled physical therapy to include manual therapy/ pain modalities as needed/therapeutic exercises and hot/cold modalities and proper  training PROBLEM LIST (Impacting functional limitations): 1. Decreased Strength 2. Decreased ADL/Functional Activities 3. Decreased Transfer Abilities 4. Decreased Ambulation Ability/Technique 5. Decreased Balance 6. Increased Pain 7. Decreased Flexibility/Joint Mobility INTERVENTIONS PLANNED: 
1. Electrical Stimulation 2. Heat 3. Home Exercise Program (HEP) 4. Manual Therapy 5. Therapeutic Exercise/Strengthening 6. Ultrasound (US)  
TREATMENT PLAN: 
Effective Dates: 2/19/2019 TO 4/20/2019 (60 days). Frequency/Duration: 2 times a week for 60 Days GOALS: (Goals have been discussed and agreed upon with patient.) Short-Term Functional Goals: Time Frame: 3-5-19 1. Low back pain is less than 5/10 at rest and less than 7-8/10 with standing/walking for greater than 2 hours 2. Trunk flexion is improved by 10% to allow increased personal care ability/putting on shoes and socks 3. Independent ambulation with minimal to no antalgic gait Instruction in exercise program to allow increased ADLs. Discharge Goals: Time Frame: 4-20-19 1. Low back pain is 1-2/10 at rest and less than 3-4/10 with chores to allow increased home ADL ability and working in yard 2. Trunk range of motion is at least 75% in all ranges to allow most personal care issues including washing and dressing and driving and helping at [employer] 3. Independent ambulation with no antalgic gait to allow walking community distances 4. LE quad and hamstring strength is at least 3+ to 4-/5 to allow minimal falls/buckling risk 5. Able to walk/stand for greater than 2 hours 6. Outcome measure score is improved from 11/50 to 6/50 7. Rehabilitation Potential For Stated Goals: Good/FAIR-PATIENT NEEDS TO MAKE TIME FOR THERAPY/LEG STRENGTHENING TO PREVENT FALLS Regarding Kaley Mendez's therapy, I certify that the treatment plan above will be carried out by a therapist or under their direction. Thank you for this referral, Paula Wright PT Referring Physician Signature: NAGA Oconnell            Date The information in this section was collected on 2-19-19 (except where otherwise noted). HISTORY:  
History of Present Injury/Illness (Reason for Referral): Insidious B low back pain with radiculopathy down L leg -LE weakness with falls precautions Past Medical History/Comorbidities:  
Mr. Stephanie Mcpherson  has a past medical history of Acute ulcer of stomach, Arthritis, BCC (basal cell carcinoma of skin), BPH (benign prostatic hyperplasia), CAD (coronary artery disease), Dental disorder, Depression, Diabetes ([facility] HLD (hyperlipidemia), HTN (hypertension), Melanoma ([facility] MI (myocardial infarction) ([facility] and Prediabetes. Mr. Stephanie Mcpherson  has a past surgical history that includes hx cervical fusion; hx other surgical; hx other surgical (2014); hx hernia repair (Right); and pr cardiac surg procedure unlist (1997). Social History/Living Environment:  
Home Environment: Private residence # Steps to Enter: 3 Rails to Enter: Yes Wheelchair Ramp: No 
One/Two Story Residence: Two story Lift Chair Available: No 
Living Alone: No 
Support Systems: Spouse/Significant Other/Partner Patient Expects to be Discharged to[de-identified] Private residence Current DME Used/Available at Home: None Tub or Shower Type: Tub/Shower combination Prior Level of Function/Work/Activity: 
Independent with home ADLs and walking Ambulatory/Rehab Services H2 Model Falls Risk Assessment Risk Factors: 
     (1)  Gender [Male] Ability to Rise from Chair: 
     (1)  Pushes up, successful in one attempt Falls Prevention Plan: No modifications necessary Total: (5 or greater = High Risk): 2  
 ©2010 [employer].  [facility] States Patent #[id number]. Federal Law prohibits the replication, distribution or use without written permission from [facility] [employer] [facility] Current Medications:   
  
Current Outpatient Medications:  
  metFORMIN (GLUCOPHAGE) 500 mg tablet, TAKE 1 TABLET BY MOUTH ONCE DAILY WITH BREAKFAST, Disp: 90 Tab, Rfl: 1 
  lisinopril (PRINIVIL, ZESTRIL) 20 mg tablet, Take 1 Tab by mouth daily. , Disp: 90 Tab, Rfl: 0 
  citalopram (CELEXA) 20 mg tablet, Take 1 Tab by mouth daily. , Disp: 90 Tab, Rfl: 0 
  varenicline (CHANTIX STARTER GAVIN) 0.5 mg (11)- 1 mg (42) DsPk, Take 1 mg by mouth two (2) times daily (after meals). , Disp: 1 Dose Pack, Rfl: 0 
  fenofibrate (LOFIBRA) 160 mg tablet, TAKE 1 TABLET BY MOUTH ONCE DAILY, Disp: 90 Tab, Rfl: 1 
  atorvastatin (LIPITOR) 80 mg tablet, TAKE 1 TABLET BY MOUTH ONCE DAILY AT BEDTIME, Disp: 90 Tab, Rfl: 1 
  traZODone (DESYREL) 50 mg tablet, Take 1 Tab by mouth nightly., Disp: 90 Tab, Rfl: 1 
  tamsulosin (FLOMAX) 0.4 mg capsule, TAKE ONE CAPSULE BY MOUTH ONCE DAILY, Disp: 90 Cap, Rfl: 3 
  amLODIPine (NORVASC) 10 mg tablet, TAKE ONE TABLET BY MOUTH ONCE DAILY, Disp: 90 Tab, Rfl: 3 
  diphenhydrAMINE-acetaminophen (TYLENOL PM EXTRA STRENGTH)  mg tab, Take 1 Tab by mouth nightly as needed. , Disp: , Rfl:  
  multivit-min-FA-lycopen-lutein (CENTRUM SILVER MEN) 300-600-300 mcg tab, Take 1 Tab by mouth nightly., Disp: , Rfl:  
  aspirin delayed-release 81 mg tablet, Take 81 mg by mouth nightly., Disp: , Rfl:   
Date Last Reviewed:  2/19/2019 Number of Personal Factors/Comorbidities that affect the Plan of Care: 
(patient has a history of melanoma, HTN, cervical fusion and diabetes) 1-2: MODERATE COMPLEXITY EXAMINATION:  
Observation/Orthostatic Postural Assessment:   
      Rounded shoulders with forward head on neck posture -posteriorly rotated L innominant Palpation:   
     Very tight R lumbar paraspinals and B gluteus medius and tender at L L4-S1 -tight B piriformis and tight hamstrings with cramping ROM:   
      LE range of motion is wfl  
 
Trunk range of motion- 
 
Flexion-66% with increased pain Extension -15 % with increased pain B sidebending -50% with increased pain B rotation-80% Strength: Ankles are 3+ to 4-/5 B quads and hamstrings are 3/5 --FALLS PRECAUTIONS B hip flexors are 3+ to 4-/5 Increased pain and cramping with manual muscle testing Special Tests:   
     Negative spring/compression/valsalva/stork-positive straight leg raise on L with tight hamstrings Neurological Screen: 
 
Radiating pain  Along L L5 pathway in L LE-patient complains of burning and numbness after extended standing Balance:   
    Intact Mental Status:   
      Alert and oriented Sensation:  
      Intact to light touch Body Structures Involved: 1. Bones 2. Joints 3. Muscles Body Functions Affected: 1. Sensory/Pain 2. Neuromusculoskeletal 
3. Movement Related Activities and Participation Affected: 1. Learning and Applying Knowledge 2. General Tasks and Demands 3. Mobility 4. Self Care 5. Domestic Life Number of elements (examined above) that affect the Plan of Care: 3: MODERATE COMPLEXITY CLINICAL PRESENTATION:  
Presentation: Evolving clinical presentation with changing clinical characteristics: MODERATE COMPLEXITY CLINICAL DECISION MAKING:  
Outcome Measure: Tool Used: Modified Oswestry Low Back Pain Questionnaire Score:  Initial: 11/50  Most Recent: X/50 (Date: -- ) Interpretation of Score: Each section is scored on a 0-5 scale, 5 representing the greatest disability. The scores of each section are added together for a total score of 50. Medical Necessity:  
· Patient is expected to demonstrate progress in strength, range of motion, balance, coordination and functional technique to increase independence with ADLs  and improve safety during walking and transfers and chores . · Skilled intervention continues to be required due to B low back pain with radiculopathy and weak LEs are affecting function and gait . Reason for Services/Other Comments: 
· Patient continues to require modification of therapeutic interventions to increase complexity of exercises. Use of outcome tool(s) and clinical judgement create a POC that gives a: 
(outcome measure illustrates up to 39% impairment ) Questionable prediction of patient's progress: MODERATE COMPLEXITY  
  
 
 
 
TREATMENT:  
(In addition to Assessment/Re-Assessment sessions the following treatments were rendered) Pre-treatment Symptoms/Complaints:  When I work at [email or web address] and stand on the hard floors for over 3-4 hours I can feel my L leg going numb and I get burning into my L leg Pain: Initial:  
Pain Intensity 1: 5 Pain Location 1: Back, Hip, Leg 
Pain Orientation 1: Left, Lower Pain Intervention(s) 1: Rest, Shower  Post Session:  4/10 -sore from being in one position -level pelvis -decreased muscle spasm Therapeutic Exercise: ( ):  reviewed in exercises and hot/cold modalities and proper body mechanics x 6 minutes Exercises per grid below to improve mobility, strength, balance and coordination. Required minimal verbal cues to promote proper body alignment, promote proper body posture and promote proper body mechanics. Progressed resistance, range, repetitions and complexity of movement as indicated. Date: 
2-19-19 Date: 
 Date: Activity/Exercise Parameters Parameters Parameters Knee to chest      
Hamstring stretch Piriformis stretch Sit to stand Seated hamstring curls with eccentric knee extension Manual Therapy (    Soft Tissue Mobilization Duration Duration: 14 Minutes to B lumbars and gluteus medius while in L sidelying position-effleurage and petrisage and trigger point work for tightness Posteriorly rotated L innominant corrected with muscle energy and pube balancing x 2 minutes Manual therapy x 16 minutes Therapeutic Modalities: for pain and edema Lumbo-Sacral Spine Heat Type: Moist pack Duration : 10 minutes Patient Position: Lying left side                             Lumbo-Sacral Spine Electric Stimulation Type: Interferential 
Placement: B lumbars Duration : 10 minutes Patient Position: Lying left side                             Lumbo-Sacral Spine Ultrasound Delivery: Continuous Intensity: 1.5 ruiz/cm sq Duration : 10 minutes Patient Position: Lying left side No charge for muscle stimulation HEP: As above; handouts given to patient for all exercises. Treatment/Session Assessment:   
· Response to Treatment:  Level pelvis -decreased spasm -sore from being in one position/sidelying . · Compliance with Program/Exercises: compliant most of the time. · Recommendations/Intent for next treatment session: \"Next visit will focus on advancements to more challenging activities\". monitor pelvic symmetry / manual therapy including pelvic traction for radicular symptoms and LE strengthening -monitor for falls due to weak and cramping LE's Total Treatment Duration:manual therapy x 16 minutes /ultrasound x 10 minutes/mucle stimulation with heat x 10 minutes/exercise review x 6 minutes /evaluation x 28 minutes -total treatment time was 70 minutes PT Patient Time In/Time Out Time In: 3868 Time Out: 1100 Lulú Tony, PT

## 2019-02-26 ENCOUNTER — HOSPITAL ENCOUNTER (OUTPATIENT)
Dept: PHYSICAL THERAPY | Age: 69
Discharge: HOME | End: 2019-02-26
Payer: MEDICARE

## 2019-02-26 PROCEDURE — 97140 MANUAL THERAPY 1/> REGIONS: CPT

## 2019-02-26 PROCEDURE — 97035 APP MDLTY 1+ULTRASOUND EA 15: CPT

## 2019-02-26 PROCEDURE — 97110 THERAPEUTIC EXERCISES: CPT

## 2019-02-26 NOTE — PROGRESS NOTES
Ann Raza : 1950 [place] Secondary: Maninder Mckee [address] at [address], [place], [address] [place] [address] [phone]   [phone] OUTPATIENT PHYSICAL THERAPY:Daily Note 2019 ICD-10: Treatment Diagnosis: M54.5 low back pain and M51.36 other intervertebral disc degeneration, lumbar region and M54.16 radiculopathy , lumbar region and R26.89 other abnormalities of gait and mobility Precautions: FALLS PRECAUTIONS-VERY WEAK QUADS -INCREASED PAIN WITH TRUNK FLEXION/COLD MAKES PAIN WORSE Allergies: Patient has no known allergies. MD Orders: evaluate and treat  MEDICAL/REFERRING DIAGNOSIS: 
Other intervertebral disc degeneration, lumbar region [M51.36] Radiculopathy, lumbar region [M54.16] Low back pain [M54.5] DATE OF ONSET: on and off x 6-7 years -worse the last year REFERRING PHYSICIAN: Qing Charles PA 
RETURN PHYSICIAN APPOINTMENT:19 INITIAL ASSESSMENT:  Mr. Erin Veloz is a 77 y.o. male presenting to physical therapy with complaints of insidious B low back pain with radicular symptoms down L leg to L foot with incidences of L leg going numb particularly after long standing  -patient ambulates independently with minimal antalgic gait -rounded shoulders with forward head on neck posture and flattened lumbar curve-posteriorly rotated L innominant is noted-very tight B lumbar paraspinals with increased tightness on R side-point tender at L L4-S1 region -tight gluteus medius and piriformis musculature  - -decreased trunk mobility with increased pain in flexion/extension/B sidebending -pain level is 5/10 but can get as high as 7-8/10-history of fallen secondary to weak/numb L LE per patient -- very weak quads and hamstrings with increased pain/cramping with muscle testing-recent history of xrays and mri per patient  (mri performed on 19 per patient ) -very good candidate for skilled physical therapy to include manual therapy/ pain modalities as needed/therapeutic exercises and hot/cold modalities and proper  training PROBLEM LIST (Impacting functional limitations): 1. Decreased Strength 2. Decreased ADL/Functional Activities 3. Decreased Transfer Abilities 4. Decreased Ambulation Ability/Technique 5. Decreased Balance 6. Increased Pain 7. Decreased Flexibility/Joint Mobility INTERVENTIONS PLANNED: 
1. Electrical Stimulation 2. Heat 3. Home Exercise Program (HEP) 4. Manual Therapy 5. Therapeutic Exercise/Strengthening 6. Ultrasound (US)  
TREATMENT PLAN: 
Effective Dates: 2/19/2019 TO 4/20/2019 (60 days). Frequency/Duration: 2 times a week for 60 Days GOALS: (Goals have been discussed and agreed upon with patient.) Short-Term Functional Goals: Time Frame: 3-5-19 1. Low back pain is less than 5/10 at rest and less than 7-8/10 with standing/walking for greater than 2 hours 2. Trunk flexion is improved by 10% to allow increased personal care ability/putting on shoes and socks 3. Independent ambulation with minimal to no antalgic gait Instruction in exercise program to allow increased ADLs. Discharge Goals: Time Frame: 4-20-19 1. Low back pain is 1-2/10 at rest and less than 3-4/10 with chores to allow increased home ADL ability and working in yard 2. Trunk range of motion is at least 75% in all ranges to allow most personal care issues including washing and dressing and driving and helping at [employer] 3. Independent ambulation with no antalgic gait to allow walking community distances 4. LE quad and hamstring strength is at least 3+ to 4-/5 to allow minimal falls/buckling risk 5. Able to walk/stand for greater than 2 hours 6. Outcome measure score is improved from 11/50 to 6/50 7. Rehabilitation Potential For Stated Goals: Good/FAIR-PATIENT NEEDS TO MAKE TIME FOR THERAPY/LEG STRENGTHENING TO PREVENT FALLS Regarding April Mendez's therapy, I certify that the treatment plan above will be carried out by a therapist or under their direction. Thank you for this referral, Leighann Burks PTA Referring Physician Signature: NAGA Trevizo            Date The information in this section was collected on 2-19-19 (except where otherwise noted). HISTORY:  
History of Present Injury/Illness (Reason for Referral): Insidious B low back pain with radiculopathy down L leg -LE weakness with falls precautions Past Medical History/Comorbidities:  
Mr. Neris Pittman  has a past medical history of Acute ulcer of stomach, Arthritis, BCC (basal cell carcinoma of skin), BPH (benign prostatic hyperplasia), CAD (coronary artery disease), Dental disorder, Depression, Diabetes ([facility] [address] HLD (hyperlipidemia), HTN (hypertension), Melanoma ([facility] [address] MI (myocardial infarction) ([facility] [address] and Prediabetes. Mr. Neris Pittman  has a past surgical history that includes hx cervical fusion; hx other surgical; hx other surgical (2014); hx hernia repair (Right); and pr cardiac surg procedure unlist (1997). Social History/Living Environment:  
  
Prior Level of Function/Work/Activity: 
Independent with home ADLs and walking Ambulatory/Rehab Services H2 Model Falls Risk Assessment Risk Factors: 
     (1)  Gender [Male] Ability to Rise from Chair: 
     (1)  Pushes up, successful in one attempt Falls Prevention Plan: No modifications necessary Total: (5 or greater = High Risk): 2  
 ©2010 [facility] Patent #[id number]. Federal Law prohibits the replication, distribution or use without written permission from [facility] [employer] Current Medications:   
  
Current Outpatient Medications:  
  metFORMIN (GLUCOPHAGE) 500 mg tablet, TAKE 1 TABLET BY MOUTH ONCE DAILY WITH BREAKFAST, Disp: 90 Tab, Rfl: 1 
  lisinopril (PRINIVIL, ZESTRIL) 20 mg tablet, Take 1 Tab by mouth daily., Disp: 90 Tab, Rfl: 0 
  citalopram (CELEXA) 20 mg tablet, Take 1 Tab by mouth daily. , Disp: 90 Tab, Rfl: 0 
  varenicline (CHANTIX STARTER GAVIN) 0.5 mg (11)- 1 mg (42) DsPk, Take 1 mg by mouth two (2) times daily (after meals). , Disp: 1 Dose Pack, Rfl: 0 
  fenofibrate (LOFIBRA) 160 mg tablet, TAKE 1 TABLET BY MOUTH ONCE DAILY, Disp: 90 Tab, Rfl: 1 
  atorvastatin (LIPITOR) 80 mg tablet, TAKE 1 TABLET BY MOUTH ONCE DAILY AT BEDTIME, Disp: 90 Tab, Rfl: 1 
  traZODone (DESYREL) 50 mg tablet, Take 1 Tab by mouth nightly., Disp: 90 Tab, Rfl: 1 
  tamsulosin (FLOMAX) 0.4 mg capsule, TAKE ONE CAPSULE BY MOUTH ONCE DAILY, Disp: 90 Cap, Rfl: 3 
  amLODIPine (NORVASC) 10 mg tablet, TAKE ONE TABLET BY MOUTH ONCE DAILY, Disp: 90 Tab, Rfl: 3 
  diphenhydrAMINE-acetaminophen (TYLENOL PM EXTRA STRENGTH)  mg tab, Take 1 Tab by mouth nightly as needed. , Disp: , Rfl:  
  multivit-min-FA-lycopen-lutein (CENTRUM SILVER MEN) 300-600-300 mcg tab, Take 1 Tab by mouth nightly., Disp: , Rfl:  
  aspirin delayed-release 81 mg tablet, Take 81 mg by mouth nightly., Disp: , Rfl:   
Date Last Reviewed:  2/26/2019 Number of Personal Factors/Comorbidities that affect the Plan of Care: 
(patient has a history of melanoma, HTN, cervical fusion and diabetes) 1-2: MODERATE COMPLEXITY EXAMINATION:  
Observation/Orthostatic Postural Assessment:   
      Rounded shoulders with forward head on neck posture -posteriorly rotated L innominant Palpation:   
     Very tight R lumbar paraspinals and B gluteus medius and tender at L L4-S1 -tight B piriformis and tight hamstrings with cramping ROM:   
      LE range of motion is wfl  
 
Trunk range of motion- 
 
Flexion-66% with increased pain Extension -15 % with increased pain B sidebending -50% with increased pain B rotation-80% Strength: Ankles are 3+ to 4-/5 B quads and hamstrings are 3/5 --FALLS PRECAUTIONS B hip flexors are 3+ to 4-/5 Increased pain and cramping with manual muscle testing Special Tests:   
     Negative spring/compression/valsalva/stork-positive straight leg raise on L with tight hamstrings Neurological Screen: 
 
Radiating pain  Along L L5 pathway in L LE-patient complains of burning and numbness after extended standing Balance:   
    Intact Mental Status:   
      Alert and oriented Sensation:  
      Intact to light touch Body Structures Involved: 1. Bones 2. Joints 3. Muscles Body Functions Affected: 1. Sensory/Pain 2. Neuromusculoskeletal 
3. Movement Related Activities and Participation Affected: 1. Learning and Applying Knowledge 2. General Tasks and Demands 3. Mobility 4. Self Care 5. Domestic Life Number of elements (examined above) that affect the Plan of Care: 3: MODERATE COMPLEXITY CLINICAL PRESENTATION:  
Presentation: Evolving clinical presentation with changing clinical characteristics: MODERATE COMPLEXITY CLINICAL DECISION MAKING:  
Outcome Measure: Tool Used: Modified Oswestry Low Back Pain Questionnaire Score:  Initial: 11/50  Most Recent: X/50 (Date: -- ) Interpretation of Score: Each section is scored on a 0-5 scale, 5 representing the greatest disability. The scores of each section are added together for a total score of 50. Medical Necessity:  
· Patient is expected to demonstrate progress in strength, range of motion, balance, coordination and functional technique to increase independence with ADLs  and improve safety during walking and transfers and chores . · Skilled intervention continues to be required due to B low back pain with radiculopathy and weak LEs are affecting function and gait . Reason for Services/Other Comments: 
· Patient continues to require modification of therapeutic interventions to increase complexity of exercises.   
Use of outcome tool(s) and clinical judgement create a POC that gives a: 
 (outcome measure illustrates up to 39% impairment ) Questionable prediction of patient's progress: MODERATE COMPLEXITY  
  
 
 
 
TREATMENT:  
(In addition to Assessment/Re-Assessment sessions the following treatments were rendered) Pre-treatment Symptoms/Complaints:  Patient reports increased stiffness after last session. States it takes a wile to get going in am however pain and numbness increases with prolonged activities. Pain: Initial:  
Pain Intensity 1: 5 Pain Location 1: Back, Leg, Hip Pain Orientation 1: Lower, Left  Post Session:  4/10 - Therapeutic Exercise: (15 Minutes): Exercises per grid below to improve mobility, strength, balance and coordination. Required minimal verbal cues to promote proper body alignment, promote proper body posture and promote proper body mechanics. Progressed resistance, range, repetitions and complexity of movement as indicated. Date: 
2-19-19 Date: 
2-26-19 Date: Activity/Exercise Parameters Parameters Parameters Knee to chest   5 x 20 sec Hamstring stretch  3 x 20 sec Piriformis stretch  3 x 20 sec Sit to stand Seated hamstring curls with eccentric knee extension Abdominal bracing  1 x 10 Manual Therapy (    Soft Tissue Mobilization Duration Duration: 20 Minutes Soft tissue mobilization to lumbosacral region in right side lying to decreased tightness Therapeutic Modalities: for pain and edema Lumbo-Sacral Spine Heat Type: Moist pack Duration : 10 minutes Patient Position: Lying right side                             Lumbo-Sacral Spine Electric Stimulation Type: Interferential 
Placement: left lumbosacral region Duration : 10 minutes Patient Position: Lying right side                             Lumbo-Sacral Spine Ultrasound Delivery: Continuous Frequency: 1 mHz Intensity: 1.5 ruiz/cm sq Duration : 10 minutes Patient Position: Lying right side No charge for muscle stimulation HEP: As above; handouts given to patient for all exercises. Treatment/Session Assessment:   
· Response to Treatment:  About the same after session. . 
· Compliance with Program/Exercises: compliant most of the time. · Recommendations/Intent for next treatment session: \"Next visit will focus on advancements to more challenging activities\". Total Treatment Duration:55 minutes PT Patient Time In/Time Out Time In: 0900 Time Out: 1000 Lj Lyon, PTA

## 2019-02-28 ENCOUNTER — HOSPITAL ENCOUNTER (OUTPATIENT)
Dept: PHYSICAL THERAPY | Age: 69
Discharge: HOME | End: 2019-02-28
Payer: MEDICARE

## 2019-02-28 PROCEDURE — 97140 MANUAL THERAPY 1/> REGIONS: CPT

## 2019-02-28 PROCEDURE — 97110 THERAPEUTIC EXERCISES: CPT

## 2019-02-28 NOTE — PROGRESS NOTES
Ronald Chapman : 1950 [place] Secondary: Maninder Mckee [address] at [address], [place], [address] [place] [address] [phone]   [phone] OUTPATIENT PHYSICAL THERAPY:Daily Note 2019 ICD-10: Treatment Diagnosis: M54.5 low back pain and M51.36 other intervertebral disc degeneration, lumbar region and M54.16 radiculopathy , lumbar region and R26.89 other abnormalities of gait and mobility Precautions: FALLS PRECAUTIONS-VERY WEAK QUADS -INCREASED PAIN WITH TRUNK FLEXION/COLD MAKES PAIN WORSE Allergies: Patient has no known allergies. MD Orders: evaluate and treat  MEDICAL/REFERRING DIAGNOSIS: 
Other intervertebral disc degeneration, lumbar region [M51.36] Radiculopathy, lumbar region [M54.16] Low back pain [M54.5] DATE OF ONSET: on and off x 6-7 years -worse the last year REFERRING PHYSICIAN: Qing Charles PA 
RETURN PHYSICIAN APPOINTMENT:19 INITIAL ASSESSMENT:  Mr. Dustin Llanos is a 77 y.o. male presenting to physical therapy with complaints of insidious B low back pain with radicular symptoms down L leg to L foot with incidences of L leg going numb particularly after long standing  -patient ambulates independently with minimal antalgic gait -rounded shoulders with forward head on neck posture and flattened lumbar curve-posteriorly rotated L innominant is noted-very tight B lumbar paraspinals with increased tightness on R side-point tender at L L4-S1 region -tight gluteus medius and piriformis musculature  - -decreased trunk mobility with increased pain in flexion/extension/B sidebending -pain level is 5/10 but can get as high as 7-8/10-history of fallen secondary to weak/numb L LE per patient -- very weak quads and hamstrings with increased pain/cramping with muscle testing-recent history of xrays and mri per patient  (mri performed on 19 per patient ) -very good candidate for skilled physical therapy to include manual therapy/ pain modalities as needed/therapeutic exercises and hot/cold modalities and proper  training PROBLEM LIST (Impacting functional limitations): 1. Decreased Strength 2. Decreased ADL/Functional Activities 3. Decreased Transfer Abilities 4. Decreased Ambulation Ability/Technique 5. Decreased Balance 6. Increased Pain 7. Decreased Flexibility/Joint Mobility INTERVENTIONS PLANNED: 
1. Electrical Stimulation 2. Heat 3. Home Exercise Program (HEP) 4. Manual Therapy 5. Therapeutic Exercise/Strengthening 6. Ultrasound (US)  
TREATMENT PLAN: 
Effective Dates: 2/19/2019 TO 4/20/2019 (60 days). Frequency/Duration: 2 times a week for 60 Days GOALS: (Goals have been discussed and agreed upon with patient.) Short-Term Functional Goals: Time Frame: 3-5-19 1. Low back pain is less than 5/10 at rest and less than 7-8/10 with standing/walking for greater than 2 hours 2. Trunk flexion is improved by 10% to allow increased personal care ability/putting on shoes and socks 3. Independent ambulation with minimal to no antalgic gait Instruction in exercise program to allow increased ADLs. Discharge Goals: Time Frame: 4-20-19 1. Low back pain is 1-2/10 at rest and less than 3-4/10 with chores to allow increased home ADL ability and working in yard 2. Trunk range of motion is at least 75% in all ranges to allow most personal care issues including washing and dressing and driving and helping at [employer] 3. Independent ambulation with no antalgic gait to allow walking community distances 4. LE quad and hamstring strength is at least 3+ to 4-/5 to allow minimal falls/buckling risk 5. Able to walk/stand for greater than 2 hours 6. Outcome measure score is improved from 11/50 to 6/50 7. Rehabilitation Potential For Stated Goals: Good/FAIR-PATIENT NEEDS TO MAKE TIME FOR THERAPY/LEG STRENGTHENING TO PREVENT FALLS Regarding April Mendez's therapy, I certify that the treatment plan above will be carried out by a therapist or under their direction. Thank you for this referral, Martha Gibson PTA Referring Physician Signature: NAGA Trevizo            Date The information in this section was collected on 2-19-19 (except where otherwise noted). HISTORY:  
History of Present Injury/Illness (Reason for Referral): Insidious B low back pain with radiculopathy down L leg -LE weakness with falls precautions Past Medical History/Comorbidities:  
Mr. Neris Pittman  has a past medical history of Acute ulcer of stomach, Arthritis, BCC (basal cell carcinoma of skin), BPH (benign prostatic hyperplasia), CAD (coronary artery disease), Dental disorder, Depression, Diabetes ([facility] [address] HLD (hyperlipidemia), HTN (hypertension), Melanoma ([facility] [address] MI (myocardial infarction) ([facility] [address] and Prediabetes. Mr. Neris Pittman  has a past surgical history that includes hx cervical fusion; hx other surgical; hx other surgical (2014); hx hernia repair (Right); and pr cardiac surg procedure unlist (1997). Social History/Living Environment:  
  
Prior Level of Function/Work/Activity: 
Independent with home ADLs and walking Ambulatory/Rehab Services H2 Model Falls Risk Assessment Risk Factors: 
     (1)  Gender [Male] Ability to Rise from Chair: 
     (1)  Pushes up, successful in one attempt Falls Prevention Plan: No modifications necessary Total: (5 or greater = High Risk): 2  
 ©2010 [facility] [address] [place] [address] Patent #[id number]. Federal Law prohibits the replication, distribution or use without written permission from [facility] [employer] Current Medications:   
  
Current Outpatient Medications:  
  metFORMIN (GLUCOPHAGE) 500 mg tablet, TAKE 1 TABLET BY MOUTH ONCE DAILY WITH BREAKFAST, Disp: 90 Tab, Rfl: 1 
  lisinopril (PRINIVIL, ZESTRIL) 20 mg tablet, Take 1 Tab by mouth daily., Disp: 90 Tab, Rfl: 0 
  citalopram (CELEXA) 20 mg tablet, Take 1 Tab by mouth daily. , Disp: 90 Tab, Rfl: 0 
  varenicline (CHANTIX STARTER GAVIN) 0.5 mg (11)- 1 mg (42) DsPk, Take 1 mg by mouth two (2) times daily (after meals). , Disp: 1 Dose Pack, Rfl: 0 
  fenofibrate (LOFIBRA) 160 mg tablet, TAKE 1 TABLET BY MOUTH ONCE DAILY, Disp: 90 Tab, Rfl: 1 
  atorvastatin (LIPITOR) 80 mg tablet, TAKE 1 TABLET BY MOUTH ONCE DAILY AT BEDTIME, Disp: 90 Tab, Rfl: 1 
  traZODone (DESYREL) 50 mg tablet, Take 1 Tab by mouth nightly., Disp: 90 Tab, Rfl: 1 
  tamsulosin (FLOMAX) 0.4 mg capsule, TAKE ONE CAPSULE BY MOUTH ONCE DAILY, Disp: 90 Cap, Rfl: 3 
  amLODIPine (NORVASC) 10 mg tablet, TAKE ONE TABLET BY MOUTH ONCE DAILY, Disp: 90 Tab, Rfl: 3 
  diphenhydrAMINE-acetaminophen (TYLENOL PM EXTRA STRENGTH)  mg tab, Take 1 Tab by mouth nightly as needed. , Disp: , Rfl:  
  multivit-min-FA-lycopen-lutein (CENTRUM SILVER MEN) 300-600-300 mcg tab, Take 1 Tab by mouth nightly., Disp: , Rfl:  
  aspirin delayed-release 81 mg tablet, Take 81 mg by mouth nightly., Disp: , Rfl:   
Date Last Reviewed:  2/28/2019 Number of Personal Factors/Comorbidities that affect the Plan of Care: 
(patient has a history of melanoma, HTN, cervical fusion and diabetes) 1-2: MODERATE COMPLEXITY EXAMINATION:  
Observation/Orthostatic Postural Assessment:   
      Rounded shoulders with forward head on neck posture -posteriorly rotated L innominant Palpation:   
     Very tight R lumbar paraspinals and B gluteus medius and tender at L L4-S1 -tight B piriformis and tight hamstrings with cramping ROM:   
      LE range of motion is wfl  
 
Trunk range of motion- 
 
Flexion-66% with increased pain Extension -15 % with increased pain B sidebending -50% with increased pain B rotation-80% Strength: Ankles are 3+ to 4-/5 B quads and hamstrings are 3/5 --FALLS PRECAUTIONS B hip flexors are 3+ to 4-/5 Increased pain and cramping with manual muscle testing Special Tests:   
     Negative spring/compression/valsalva/stork-positive straight leg raise on L with tight hamstrings Neurological Screen: 
 
Radiating pain  Along L L5 pathway in L LE-patient complains of burning and numbness after extended standing Balance:   
    Intact Mental Status:   
      Alert and oriented Sensation:  
      Intact to light touch Body Structures Involved: 1. Bones 2. Joints 3. Muscles Body Functions Affected: 1. Sensory/Pain 2. Neuromusculoskeletal 
3. Movement Related Activities and Participation Affected: 1. Learning and Applying Knowledge 2. General Tasks and Demands 3. Mobility 4. Self Care 5. Domestic Life Number of elements (examined above) that affect the Plan of Care: 3: MODERATE COMPLEXITY CLINICAL PRESENTATION:  
Presentation: Evolving clinical presentation with changing clinical characteristics: MODERATE COMPLEXITY CLINICAL DECISION MAKING:  
Outcome Measure: Tool Used: Modified Oswestry Low Back Pain Questionnaire Score:  Initial: 11/50  Most Recent: X/50 (Date: -- ) Interpretation of Score: Each section is scored on a 0-5 scale, 5 representing the greatest disability. The scores of each section are added together for a total score of 50. Medical Necessity:  
· Patient is expected to demonstrate progress in strength, range of motion, balance, coordination and functional technique to increase independence with ADLs  and improve safety during walking and transfers and chores . · Skilled intervention continues to be required due to B low back pain with radiculopathy and weak LEs are affecting function and gait . Reason for Services/Other Comments: 
· Patient continues to require modification of therapeutic interventions to increase complexity of exercises.   
Use of outcome tool(s) and clinical judgement create a POC that gives a: 
 (outcome measure illustrates up to 39% impairment ) Questionable prediction of patient's progress: MODERATE COMPLEXITY  
  
 
 
 
TREATMENT:  
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 Pain: Initial:  
Pain Intensity 1: 5 Pain Location 1: Back, Leg 
Pain Orientation 1: Lower, Left  Post Session:  2/10 Therapeutic Exercise: (40 Minutes): Exercises per grid below to improve mobility, strength, balance and coordination. Required minimal verbal cues to promote proper body alignment, promote proper body posture and promote proper body mechanics. Progressed resistance, range, repetitions and complexity of movement as indicated. Date: 
2-19-19 Date: 
2-26-19 Date: 
2/28/19 Activity/Exercise Parameters Parameters Parameters Nu step    Level 4 x 10 mins Calf stretch   4 x 30 sec hold on incline I T band stretch   4x30 sec hold with green strap Knee to chest   5 x 20 sec 4x30 sec hold Hamstring stretch  3 x 20 sec 4x30 sec hold with strap Piriformis stretch  3 x 20 sec 4x30 sec hold Sit to stand    X 10 reps Seated hamstring curls with eccentric knee extension   Green strap x 10 reps Abdominal bracing  1 x 10 X 10 reps x 10 sec hold Manual Therapy (    Soft Tissue Mobilization Duration Duration: 15 Minutes Soft tissue mobilization to left  lumbosacral region & left IT band in right side lying to decreased tightness with and without suction cup to decrease pain and muscle tightness. Therapeutic Modalities: for pain and edema (no charge for estim) Lumbo-Sacral Spine Heat Type: Moist pack Duration : 10 minutes Patient Position: Lying right side                             Lumbo-Sacral Spine Electric Stimulation Type: Interferential 
Placement: left lumbosacral paraspinals Duration : 10 minutes Patient Position: Lying right side HEP: As above; handouts given to patient for all exercises. Treatment/Session Assessment:   
· Response to Treatment:   Pt. Was compliant with all exercises and reported decreased pain. · Compliance with Program/Exercises: compliant most of the time. · Recommendations/Intent for next treatment session: \"Next visit will focus on advancements to more challenging activities\". Total Treatment Duration: 65  minutes PT Patient Time In/Time Out Time In: 0900 Time Out: 1005 Sonya Sahni PTA

## 2019-03-05 ENCOUNTER — HOSPITAL ENCOUNTER (OUTPATIENT)
Dept: PHYSICAL THERAPY | Age: 69
Discharge: HOME | End: 2019-03-05
Payer: MEDICARE

## 2019-03-05 PROCEDURE — 97035 APP MDLTY 1+ULTRASOUND EA 15: CPT

## 2019-03-05 PROCEDURE — 97110 THERAPEUTIC EXERCISES: CPT

## 2019-03-05 PROCEDURE — 97140 MANUAL THERAPY 1/> REGIONS: CPT

## 2019-03-05 NOTE — PROGRESS NOTES
Jordon Vasquez  : 1950  [place]  Secondary: Maninder Mckee [address] at [address], [place], [address] [place] [address]  [phone]   [phone]       OUTPATIENT PHYSICAL THERAPY:Daily Note 3/5/2019    ICD-10: Treatment Diagnosis: M54.5 low back pain and M51.36 other intervertebral disc degeneration, lumbar region and M54.16 radiculopathy , lumbar region and R26.89 other abnormalities of gait and mobility   Precautions: FALLS PRECAUTIONS-VERY WEAK QUADS -INCREASED PAIN WITH TRUNK FLEXION/COLD MAKES PAIN WORSE   Allergies: Patient has no known allergies.    MD Orders: evaluate and treat  MEDICAL/REFERRING DIAGNOSIS:  Other intervertebral disc degeneration, lumbar region [M51.36]  Radiculopathy, lumbar region [M54.16]  Low back pain [M54.5]   DATE OF ONSET: on and off x 6-7 years -worse the last year   REFERRING PHYSICIAN: NAGA Joiner  RETURN PHYSICIAN APPOINTMENT:19     INITIAL ASSESSMENT:  Mr. Avi Low is a 77 y.o. male presenting to physical therapy with complaints of insidious B low back pain with radicular symptoms down L leg to L foot with incidences of L leg going numb particularly after long standing  -patient ambulates independently with minimal antalgic gait -rounded shoulders with forward head on neck posture and flattened lumbar curve-posteriorly rotated L innominant is noted-very tight B lumbar paraspinals with increased tightness on R side-point tender at L L4-S1 region -tight gluteus medius and piriformis musculature  - -decreased trunk mobility with increased pain in flexion/extension/B sidebending -pain level is 5/10 but can get as high as 7-8/10-history of fallen secondary to weak/numb L LE per patient -- very weak quads and hamstrings with increased pain/cramping with muscle testing-recent history of xrays and mri per patient  (mri performed on 19 per patient ) -very good candidate for skilled physical therapy to include manual therapy/ pain modalities as needed/therapeutic exercises and hot/cold modalities and proper  training      PROBLEM LIST (Impacting functional limitations):  1. Decreased Strength  2. Decreased ADL/Functional Activities  3. Decreased Transfer Abilities  4. Decreased Ambulation Ability/Technique  5. Decreased Balance  6. Increased Pain  7. Decreased Flexibility/Joint Mobility INTERVENTIONS PLANNED:  1. Electrical Stimulation  2. Heat  3. Home Exercise Program (HEP)  4. Manual Therapy  5. Therapeutic Exercise/Strengthening  6. Ultrasound (US)   TREATMENT PLAN:  Effective Dates: 2/19/2019 TO 4/20/2019 (60 days). Frequency/Duration: 2 times a week for 60 Days  GOALS: (Goals have been discussed and agreed upon with patient.)  Short-Term Functional Goals: Time Frame: 3-5-19  1. Low back pain is less than 5/10 at rest and less than 7-8/10 with standing/walking for greater than 2 hours   2. Trunk flexion is improved by 10% to allow increased personal care ability/putting on shoes and socks   3. Independent ambulation with minimal to no antalgic gait   Instruction in exercise program to allow increased ADLs. Discharge Goals: Time Frame: 4-20-19  1. Low back pain is 1-2/10 at rest and less than 3-4/10 with chores to allow increased home ADL ability and working in yard  2. Trunk range of motion is at least 75% in all ranges to allow most personal care issues including washing and dressing and driving and helping at [employer]    3. Independent ambulation with no antalgic gait to allow walking community distances  4. LE quad and hamstring strength is at least 3+ to 4-/5 to allow minimal falls/buckling risk   5. Able to walk/stand for greater than 2 hours   6. Outcome measure score is improved from 11/50 to 6/50   7.    Rehabilitation Potential For Stated Goals: Good/FAIR-PATIENT NEEDS TO MAKE TIME FOR THERAPY/LEG STRENGTHENING TO PREVENT FALLS  Regarding Maksim Walton therapy, I certify that the treatment plan above will be carried out by a therapist or under their direction. Thank you for this referral,  Chrissy Strauss PT     Referring Physician Signature: NAGA Joiner              Date                    The information in this section was collected on 2-19-19 (except where otherwise noted). HISTORY:   History of Present Injury/Illness (Reason for Referral): Insidious B low back pain with radiculopathy down L leg -LE weakness with falls precautions  Past Medical History/Comorbidities:   Mr. Avi Low  has a past medical history of Acute ulcer of stomach, Arthritis, BCC (basal cell carcinoma of skin), BPH (benign prostatic hyperplasia), CAD (coronary artery disease), Dental disorder, Depression, Diabetes ([facility] [address] HLD (hyperlipidemia), HTN (hypertension), Melanoma ([facility] MI (myocardial infarction) ([facility] and Prediabetes. Mr. Avi Low  has a past surgical history that includes hx cervical fusion; hx other surgical; hx other surgical (2014); hx hernia repair (Right); and pr cardiac surg procedure unlist (1997). Social History/Living Environment:      Prior Level of Function/Work/Activity:  Independent with home ADLs and walking      Ambulatory/Rehab Services H2 Model Falls Risk Assessment    Risk Factors:       (1)  Gender [Male] Ability to Rise from Chair:       (1)  Pushes up, successful in one attempt    Falls Prevention Plan:       No modifications necessary   Total: (5 or greater = High Risk): 2    ©2010 [facility]. All Rights Reserved. [facility] States Patent #[id number]. Federal Law prohibits the replication, distribution or use without written permission from [facility] [employer]       Current Medications:       Current Outpatient Medications:     metFORMIN (GLUCOPHAGE) 500 mg tablet, TAKE 1 TABLET BY MOUTH ONCE DAILY WITH BREAKFAST, Disp: 90 Tab, Rfl: 1    lisinopril (PRINIVIL, ZESTRIL) 20 mg tablet, Take 1 Tab by mouth daily. , Disp: 90 Tab, Rfl: 0   citalopram (CELEXA) 20 mg tablet, Take 1 Tab by mouth daily. , Disp: 90 Tab, Rfl: 0    varenicline (CHANTIX STARTER GAVIN) 0.5 mg (11)- 1 mg (42) DsPk, Take 1 mg by mouth two (2) times daily (after meals). , Disp: 1 Dose Pack, Rfl: 0    fenofibrate (LOFIBRA) 160 mg tablet, TAKE 1 TABLET BY MOUTH ONCE DAILY, Disp: 90 Tab, Rfl: 1    atorvastatin (LIPITOR) 80 mg tablet, TAKE 1 TABLET BY MOUTH ONCE DAILY AT BEDTIME, Disp: 90 Tab, Rfl: 1    traZODone (DESYREL) 50 mg tablet, Take 1 Tab by mouth nightly., Disp: 90 Tab, Rfl: 1    tamsulosin (FLOMAX) 0.4 mg capsule, TAKE ONE CAPSULE BY MOUTH ONCE DAILY, Disp: 90 Cap, Rfl: 3    amLODIPine (NORVASC) 10 mg tablet, TAKE ONE TABLET BY MOUTH ONCE DAILY, Disp: 90 Tab, Rfl: 3    diphenhydrAMINE-acetaminophen (TYLENOL PM EXTRA STRENGTH)  mg tab, Take 1 Tab by mouth nightly as needed. , Disp: , Rfl:     multivit-min-FA-lycopen-lutein (CENTRUM SILVER MEN) 300-600-300 mcg tab, Take 1 Tab by mouth nightly., Disp: , Rfl:     aspirin delayed-release 81 mg tablet, Take 81 mg by mouth nightly., Disp: , Rfl:    Date Last Reviewed:  3/5/2019   Number of Personal Factors/Comorbidities that affect the Plan of Care:  (patient has a history of melanoma, HTN, cervical fusion and diabetes) 1-2: MODERATE COMPLEXITY   EXAMINATION:   Observation/Orthostatic Postural Assessment:          Rounded shoulders with forward head on neck posture -posteriorly rotated L innominant   Palpation:         Very tight R lumbar paraspinals and B gluteus medius and tender at L L4-S1 -tight B piriformis and tight hamstrings with cramping     ROM:          LE range of motion is wfl     Trunk range of motion-    Flexion-66% with increased pain   Extension -15 % with increased pain   B sidebending -50% with increased pain  B rotation-80%     Strength:         Ankles are 3+ to 4-/5     B quads and hamstrings are 3/5 --FALLS PRECAUTIONS     B hip flexors are 3+ to 4-/5     Increased pain and cramping with manual muscle testing       Special Tests:         Negative spring/compression/valsalva/stork-positive straight leg raise on L with tight hamstrings   Neurological Screen:    Radiating pain  Along L L5 pathway in L LE-patient complains of burning and numbness after extended standing     Balance:        Intact   Mental Status:          Alert and oriented     Sensation:         Intact to light touch      Body Structures Involved:  1. Bones  2. Joints   3. Muscles      Body Functions Affected:  1. Sensory/Pain  2. Neuromusculoskeletal  3. Movement Related Activities and Participation Affected:  1. Learning and Applying Knowledge  2. General Tasks and Demands  3. Mobility  4. Self Care  5. Domestic Life   Number of elements (examined above) that affect the Plan of Care: 3: MODERATE COMPLEXITY   CLINICAL PRESENTATION:   Presentation: Evolving clinical presentation with changing clinical characteristics: MODERATE COMPLEXITY   CLINICAL DECISION MAKING:   Outcome Measure: Tool Used: Modified Oswestry Low Back Pain Questionnaire  Score:  Initial: 11/50  Most Recent: X/50 (Date: -- )   Interpretation of Score: Each section is scored on a 0-5 scale, 5 representing the greatest disability. The scores of each section are added together for a total score of 50. Medical Necessity:   · Patient is expected to demonstrate progress in strength, range of motion, balance, coordination and functional technique to increase independence with ADLs  and improve safety during walking and transfers and chores . · Skilled intervention continues to be required due to B low back pain with radiculopathy and weak LEs are affecting function and gait . Reason for Services/Other Comments:  · Patient continues to require modification of therapeutic interventions to increase complexity of exercises.    Use of outcome tool(s) and clinical judgement create a POC that gives a:  (outcome measure illustrates up to 39% impairment ) Questionable prediction of patient's progress: MODERATE COMPLEXITY            TREATMENT:   (In addition to Assessment/Re-Assessment sessions the following treatments were rendered)  Pre-treatment Symptoms/Complaints:  Patient reported feeling better with increased flexibility -\"I am scheduled for a steroid injection next week . Pain: Initial:   Pain Intensity 1: 4  Pain Location 1: Back, Leg  Pain Orientation 1: Left, Lower  Pain Intervention(s) 1: Cold pack, Exercise, Massage, Traction  Post Session:  2/10 -patient advised to take work breaks at [employer] and stretch his gluteus medius and use ice while seated     Therapeutic Exercise: ( ):  x 18 minutes    Exercises per grid below to improve mobility, strength, balance and coordination. Required minimal verbal cues to promote proper body alignment, promote proper body posture and promote proper body mechanics. Progressed resistance, range, repetitions and complexity of movement as indicated.    Date:  3-5-19 Date:  2-26-19 Date:  2/28/19   Activity/Exercise Parameters Parameters Parameters   Nu step    Level 4 x 10 mins    Calf stretch   4 x 30 sec hold on incline    I T band stretch   4x30 sec hold with green strap               Knee to chest  4 x 30 seconds with towel  5 x 20 sec 4x30 sec hold    Hamstring stretch 4 x 30 seconds with belt 3 x 20 sec 4x30 sec hold with strap    Piriformis stretch 4 x 30 seconds  3 x 20 sec 4x30 sec hold    Sit to stand    X 10 reps    Seated hamstring curls with eccentric knee extension   Green strap x 10 reps    Abdominal bracing X 10 with 3 second hold 1 x 10 X 10 reps x 10 sec hold    Gluteal sets X 10 with 3 second hold     Hip adduction X 10 with theraball     Hip abduction X 10 with black band     Gluteus medius -hip abduction/hip extension/foot int rot  2 x 5              Manual Therapy (    Soft Tissue Mobilization Duration  Duration: 14 Minutes Soft tissue mobilization to left  lumbosacral region & left gluteus medius in  right side lying to decrease tightness -effleurage and petrisage       Manual pelvic traction x 12 minutes while supine       Manual therapy x 26 minutes . Therapeutic Modalities: for pain and edema (no charge for estim)                               Lumbo-Sacral Spine Cold  Type: Cold/ice pack  Duration : 10 minutes  Patient Position: Lying right side              Lumbo-Sacral Spine Electric Stimulation  Type: Interferential  Placement:  lumbars and gluteus medius  Duration : 10 minutes  Patient Position: Lying right side                             Lumbo-Sacral Spine Ultrasound  Delivery: Continuous  Intensity: 1.5 ruiz/cm sq  Duration : 10 minutes  Patient Position: Lying right side                    HEP: As above; handouts given to patient for all exercises. Treatment/Session Assessment:    · Response to Treatment:  Decreased pain -less radiculopathy -more flexible    · Compliance with Program/Exercises: compliant most of the time. · Recommendations/Intent for next treatment session: \"Next visit will focus on advancements to more challenging activities\".     Total Treatment Duration: 64  minutes       PT Patient Time In/Time Out  Time In: 0900  Time Out: [address], PT

## 2019-03-07 ENCOUNTER — HOSPITAL ENCOUNTER (OUTPATIENT)
Dept: PHYSICAL THERAPY | Age: 69
Discharge: HOME | End: 2019-03-07
Payer: MEDICARE

## 2019-03-07 PROCEDURE — 97140 MANUAL THERAPY 1/> REGIONS: CPT

## 2019-03-07 PROCEDURE — 97035 APP MDLTY 1+ULTRASOUND EA 15: CPT

## 2019-03-07 NOTE — PROGRESS NOTES
Isai Garcia  : 1950  [place]  Secondary: Maninder Mckee 76 at [facility]  [address] [place] [address], Jeovanny agarwal, [address] [place] [address]  [phone]   [phone]       OUTPATIENT PHYSICAL THERAPY:Daily Note 3/7/2019    ICD-10: Treatment Diagnosis: M54.5 low back pain and M51.36 other intervertebral disc degeneration, lumbar region and M54.16 radiculopathy , lumbar region and R26.89 other abnormalities of gait and mobility   Precautions: FALLS PRECAUTIONS-VERY WEAK QUADS -INCREASED PAIN WITH TRUNK FLEXION/COLD MAKES PAIN WORSE   Allergies: Patient has no known allergies.    MD Orders: evaluate and treat  MEDICAL/REFERRING DIAGNOSIS:  Other intervertebral disc degeneration, lumbar region [M51.36]  Radiculopathy, lumbar region [M54.16]  Low back pain [M54.5]   DATE OF ONSET: on and off x 6-7 years -worse the last year   REFERRING PHYSICIAN: NAGA Bee  RETURN PHYSICIAN APPOINTMENT:19     INITIAL ASSESSMENT:  Mr. Av James is a 77 y.o. male presenting to physical therapy with complaints of insidious B low back pain with radicular symptoms down L leg to L foot with incidences of L leg going numb particularly after long standing  -patient ambulates independently with minimal antalgic gait -rounded shoulders with forward head on neck posture and flattened lumbar curve-posteriorly rotated L innominant is noted-very tight B lumbar paraspinals with increased tightness on R side-point tender at L L4-S1 region -tight gluteus medius and piriformis musculature  - -decreased trunk mobility with increased pain in flexion/extension/B sidebending -pain level is 5/10 but can get as high as 7-8/10-history of fallen secondary to weak/numb L LE per patient -- very weak quads and hamstrings with increased pain/cramping with muscle testing-recent history of xrays and mri per patient  (mri performed on 19 per patient ) -very good candidate for skilled physical therapy to include manual therapy/ pain modalities as needed/therapeutic exercises and hot/cold modalities and proper  training      PROBLEM LIST (Impacting functional limitations):  1. Decreased Strength  2. Decreased ADL/Functional Activities  3. Decreased Transfer Abilities  4. Decreased Ambulation Ability/Technique  5. Decreased Balance  6. Increased Pain  7. Decreased Flexibility/Joint Mobility INTERVENTIONS PLANNED:  1. Electrical Stimulation  2. Heat  3. Home Exercise Program (HEP)  4. Manual Therapy  5. Therapeutic Exercise/Strengthening  6. Ultrasound (US)   TREATMENT PLAN:  Effective Dates: 2/19/2019 TO 4/20/2019 (60 days). Frequency/Duration: 2 times a week for 60 Days  GOALS: (Goals have been discussed and agreed upon with patient.)  Short-Term Functional Goals: Time Frame: 3-5-19  1. Low back pain is less than 5/10 at rest and less than 7-8/10 with standing/walking for greater than 2 hours   2. Trunk flexion is improved by 10% to allow increased personal care ability/putting on shoes and socks   3. Independent ambulation with minimal to no antalgic gait   Instruction in exercise program to allow increased ADLs. Discharge Goals: Time Frame: 4-20-19  1. Low back pain is 1-2/10 at rest and less than 3-4/10 with chores to allow increased home ADL ability and working in yard  2. Trunk range of motion is at least 75% in all ranges to allow most personal care issues including washing and dressing and driving and helping at [employer]    3. Independent ambulation with no antalgic gait to allow walking community distances  4. LE quad and hamstring strength is at least 3+ to 4-/5 to allow minimal falls/buckling risk   5. Able to walk/stand for greater than 2 hours   6. Outcome measure score is improved from 11/50 to 6/50   7.    Rehabilitation Potential For Stated Goals: Good/FAIR-PATIENT NEEDS TO MAKE TIME FOR THERAPY/LEG STRENGTHENING TO PREVENT FALLS  Regarding Wilber Goel therapy, I certify that the treatment plan above will be carried out by a therapist or under their direction. Thank you for this referral,  Faith Beal PTA     Referring Physician Signature: NAGA Pritchard              Date                    The information in this section was collected on 2-19-19 (except where otherwise noted). HISTORY:   History of Present Injury/Illness (Reason for Referral): Insidious B low back pain with radiculopathy down L leg -LE weakness with falls precautions  Past Medical History/Comorbidities:   Mr. Stephen Avila  has a past medical history of Acute ulcer of stomach, Arthritis, BCC (basal cell carcinoma of skin), BPH (benign prostatic hyperplasia), CAD (coronary artery disease), Dental disorder, Depression, Diabetes ([facility] [address] HLD (hyperlipidemia), HTN (hypertension), Melanoma ([facility] [address] MI (myocardial infarction) ([facility] [address] and Prediabetes. Mr. Stephen Avila  has a past surgical history that includes hx cervical fusion; hx other surgical; hx other surgical (2014); hx hernia repair (Right); and pr cardiac surg procedure unlist (1997). Social History/Living Environment:      Prior Level of Function/Work/Activity:  Independent with home ADLs and walking      Ambulatory/Rehab Services H2 Model Falls Risk Assessment    Risk Factors:       (1)  Gender [Male] Ability to Rise from Chair:       (1)  Pushes up, successful in one attempt    Falls Prevention Plan:       No modifications necessary   Total: (5 or greater = High Risk): 2    ©2010 [facility] of [facility]. All Rights Reserved. [facility] States Patent #1,376,843. Federal Law prohibits the replication, distribution or use without written permission from [facility] of [address] [place] [address]       Current Medications:       Current Outpatient Medications:     metFORMIN (GLUCOPHAGE) 500 mg tablet, TAKE 1 TABLET BY MOUTH ONCE DAILY WITH BREAKFAST, Disp: 90 Tab, Rfl: 1    lisinopril (PRINIVIL, ZESTRIL) 20 mg tablet, Take 1 Tab by mouth daily. , Disp: 90 Tab, Rfl: 0   citalopram (CELEXA) 20 mg tablet, Take 1 Tab by mouth daily. , Disp: 90 Tab, Rfl: 0    varenicline (CHANTIX STARTER GAVIN) 0.5 mg (11)- 1 mg (42) DsPk, Take 1 mg by mouth two (2) times daily (after meals). , Disp: 1 Dose Pack, Rfl: 0    fenofibrate (LOFIBRA) 160 mg tablet, TAKE 1 TABLET BY MOUTH ONCE DAILY, Disp: 90 Tab, Rfl: 1    atorvastatin (LIPITOR) 80 mg tablet, TAKE 1 TABLET BY MOUTH ONCE DAILY AT BEDTIME, Disp: 90 Tab, Rfl: 1    traZODone (DESYREL) 50 mg tablet, Take 1 Tab by mouth nightly., Disp: 90 Tab, Rfl: 1    tamsulosin (FLOMAX) 0.4 mg capsule, TAKE ONE CAPSULE BY MOUTH ONCE DAILY, Disp: 90 Cap, Rfl: 3    amLODIPine (NORVASC) 10 mg tablet, TAKE ONE TABLET BY MOUTH ONCE DAILY, Disp: 90 Tab, Rfl: 3    diphenhydrAMINE-acetaminophen (TYLENOL PM EXTRA STRENGTH)  mg tab, Take 1 Tab by mouth nightly as needed. , Disp: , Rfl:     multivit-min-FA-lycopen-lutein (CENTRUM SILVER MEN) 300-600-300 mcg tab, Take 1 Tab by mouth nightly., Disp: , Rfl:     aspirin delayed-release 81 mg tablet, Take 81 mg by mouth nightly., Disp: , Rfl:    Date Last Reviewed:  3/7/2019   Number of Personal Factors/Comorbidities that affect the Plan of Care:  (patient has a history of melanoma, HTN, cervical fusion and diabetes) 1-2: MODERATE COMPLEXITY   EXAMINATION:   Observation/Orthostatic Postural Assessment:          Rounded shoulders with forward head on neck posture -posteriorly rotated L innominant   Palpation:         Very tight R lumbar paraspinals and B gluteus medius and tender at L L4-S1 -tight B piriformis and tight hamstrings with cramping     ROM:          LE range of motion is wfl     Trunk range of motion-    Flexion-66% with increased pain   Extension -15 % with increased pain   B sidebending -50% with increased pain  B rotation-80%     Strength:         Ankles are 3+ to 4-/5     B quads and hamstrings are 3/5 --FALLS PRECAUTIONS     B hip flexors are 3+ to 4-/5     Increased pain and cramping with manual muscle testing       Special Tests:         Negative spring/compression/valsalva/stork-positive straight leg raise on L with tight hamstrings   Neurological Screen:    Radiating pain  Along L L5 pathway in L LE-patient complains of burning and numbness after extended standing     Balance:        Intact   Mental Status:          Alert and oriented     Sensation:         Intact to light touch      Body Structures Involved:  1. Bones  2. Joints   3. Muscles      Body Functions Affected:  1. Sensory/Pain  2. Neuromusculoskeletal  3. Movement Related Activities and Participation Affected:  1. Learning and Applying Knowledge  2. General Tasks and Demands  3. Mobility  4. Self Care  5. Domestic Life   Number of elements (examined above) that affect the Plan of Care: 3: MODERATE COMPLEXITY   CLINICAL PRESENTATION:   Presentation: Evolving clinical presentation with changing clinical characteristics: MODERATE COMPLEXITY   CLINICAL DECISION MAKING:   Outcome Measure: Tool Used: Modified Oswestry Low Back Pain Questionnaire  Score:  Initial: 11/50  Most Recent: X/50 (Date: -- )   Interpretation of Score: Each section is scored on a 0-5 scale, 5 representing the greatest disability. The scores of each section are added together for a total score of 50. Medical Necessity:   · Patient is expected to demonstrate progress in strength, range of motion, balance, coordination and functional technique to increase independence with ADLs  and improve safety during walking and transfers and chores . · Skilled intervention continues to be required due to B low back pain with radiculopathy and weak LEs are affecting function and gait . Reason for Services/Other Comments:  · Patient continues to require modification of therapeutic interventions to increase complexity of exercises.    Use of outcome tool(s) and clinical judgement create a POC that gives a:  (outcome measure illustrates up to 39% impairment ) Questionable prediction of patient's progress: MODERATE COMPLEXITY            TREATMENT:   (In addition to Assessment/Re-Assessment sessions the following treatments were rendered)  Pre-treatment Symptoms/Complaints:  Patient reported feeling better and states the therapy seems to be helping. Reports intermittent pain and tingling however with decreased intensity and frequency      Pain: Initial:   Pain Intensity 1: 2  Pain Location 1: Back, Leg  Pain Orientation 1: Left  Post Session:  1/10 -     Therapeutic Exercise: ( ):      Exercises per grid below to improve mobility, strength, balance and coordination. Required minimal verbal cues to promote proper body alignment, promote proper body posture and promote proper body mechanics. Progressed resistance, range, repetitions and complexity of movement as indicated.    Date:  3-5-19 Date:  2-26-19 Date:  2/28/19   Activity/Exercise Parameters Parameters Parameters   Nu step    Level 4 x 10 mins    Calf stretch   4 x 30 sec hold on incline    I T band stretch   4x30 sec hold with green strap               Knee to chest  4 x 30 seconds with towel  5 x 20 sec 4x30 sec hold    Hamstring stretch 4 x 30 seconds with belt 3 x 20 sec 4x30 sec hold with strap    Piriformis stretch 4 x 30 seconds  3 x 20 sec 4x30 sec hold    Sit to stand    X 10 reps    Seated hamstring curls with eccentric knee extension   Green strap x 10 reps    Abdominal bracing X 10 with 3 second hold 1 x 10 X 10 reps x 10 sec hold    Gluteal sets X 10 with 3 second hold     Hip adduction X 10 with theraball     Hip abduction X 10 with black band     Gluteus medius -hip abduction/hip extension/foot int rot  2 x 5              Manual Therapy (    Soft Tissue Mobilization Duration  Duration: 30 Minutes Soft tissue mobilization to left  lumbosacral region & left gluteus medius in  right side lying to decrease tightness -effleurage and petrissage followed by Manual pelvic traction via long axis distraction         Therapeutic Modalities: for pain and edema (no charge for estim)                                                                            Lumbo-Sacral Spine Ultrasound  Delivery: Continuous  Frequency: 1 mHz  Intensity: 1.5 ruiz/cm sq  Duration : 12 minutes  Patient Position: Lying right side                    HEP: As above; handouts given to patient for all exercises. Treatment/Session Assessment:    · Response to Treatment:  Good relief with treatment   · Compliance with Program/Exercises: compliant most of the time. · Recommendations/Intent for next treatment session: \"Next visit will focus on advancements to more challenging activities\".     Total Treatment Duration: 42  minutes       PT Patient Time In/Time Out  Time In: 1000  Time Out: [address] [place] [address], [facility]

## 2019-03-12 ENCOUNTER — APPOINTMENT (OUTPATIENT)
Dept: PHYSICAL THERAPY | Age: 69
End: 2019-03-12
Payer: MEDICARE

## 2019-03-14 ENCOUNTER — APPOINTMENT (OUTPATIENT)
Dept: PHYSICAL THERAPY | Age: 69
End: 2019-03-14
Payer: MEDICARE

## 2019-03-19 ENCOUNTER — HOSPITAL ENCOUNTER (OUTPATIENT)
Dept: PHYSICAL THERAPY | Age: 69
Discharge: HOME | End: 2019-03-19
Payer: MEDICARE

## 2019-03-19 PROCEDURE — 97035 APP MDLTY 1+ULTRASOUND EA 15: CPT

## 2019-03-19 PROCEDURE — 97140 MANUAL THERAPY 1/> REGIONS: CPT

## 2019-03-19 PROCEDURE — 97110 THERAPEUTIC EXERCISES: CPT

## 2019-03-19 NOTE — PROGRESS NOTES
Sharyn Alexander : 1950 [place] Secondary: Maninder Mckee 76 at [address], [place], [address] [place] [address] [phone]   [phone] OUTPATIENT PHYSICAL THERAPY:Daily Note and Progress Report 3/19/2019 ICD-10: Treatment Diagnosis: M54.5 low back pain and M51.36 other intervertebral disc degeneration, lumbar region and M54.16 radiculopathy , lumbar region and R26.89 other abnormalities of gait and mobility Precautions: FALLS PRECAUTIONS-VERY WEAK QUADS -INCREASED PAIN WITH TRUNK FLEXION/COLD MAKES PAIN WORSE Allergies: Patient has no known allergies. MD Orders: evaluate and treat  MEDICAL/REFERRING DIAGNOSIS: 
Other intervertebral disc degeneration, lumbar region [M51.36] Radiculopathy, lumbar region [M54.16] Low back pain [M54.5] DATE OF ONSET: on and off x 6-7 years -worse the last year REFERRING PHYSICIAN: Qing Charles PA 
RETURN PHYSICIAN APPOINTMENT:19 INITIAL ASSESSMENT:  Mr. Emily Christina is a 77 y.o. male presenting to physical therapy with complaints of insidious B low back pain with radicular symptoms down L leg to L foot with incidences of L leg going numb particularly after long standing  -patient ambulates independently with minimal antalgic gait -rounded shoulders with forward head on neck posture and flattened lumbar curve-posteriorly rotated L innominant is noted-very tight B lumbar paraspinals with increased tightness on R side-point tender at L L4-S1 region -tight gluteus medius and piriformis musculature  - -decreased trunk mobility with increased pain in flexion/extension/B sidebending -pain level is 5/10 but can get as high as 7-8/10-history of fallen secondary to weak/numb L LE per patient -- very weak quads and hamstrings with increased pain/cramping with muscle testing-recent history of xrays and mri per patient  (mri performed on 19 per patient ) -very good candidate for skilled physical therapy to include manual therapy/ pain modalities as needed/therapeutic exercises and hot/cold modalities and proper  training Patient has been seen for 6 visits of low back rehab with good progress from 2-19-19 to 3-19-19-decreased lumbar and gluteus medius spasm with no radicular symptoms-pain level is 0/10 after recent steroid injection -independent ambulation with minimal antalgic gait-independent wit home exercise program-trunk range of motion is improved but still having tight hamstrings -overall good progress-low back pain outcome measure score is improved from 11/50 to 2/50 -continue current regimen with LE strengthening -motivated patient -pleasant gentleman to work with PROBLEM LIST (Impacting functional limitations): 1. Decreased Strength 2. Decreased ADL/Functional Activities 3. Decreased Transfer Abilities 4. Decreased Ambulation Ability/Technique 5. Decreased Balance 6. Increased Pain 7. Decreased Flexibility/Joint Mobility INTERVENTIONS PLANNED: 
1. Electrical Stimulation 2. Heat 3. Home Exercise Program (HEP) 4. Manual Therapy 5. Therapeutic Exercise/Strengthening 6. Ultrasound (US)  
TREATMENT PLAN: 
Effective Dates: 2/19/2019 TO 4/20/2019 (60 days). Frequency/Duration: 2 times a week for 60 Days GOALS: (Goals have been discussed and agreed upon with patient.) Short-Term Functional Goals: Time Frame: 3-5-19 1. Low back pain is less than 5/10 at rest and less than 7-8/10 with standing/walking for greater than 2 hours -GOAL MET 2. Trunk flexion is improved by 10% to allow increased personal care ability/putting on shoes and socks -GOAL MET 3. Independent ambulation with minimal to no antalgic gait -GOAL MET Instruction in exercise program to allow increased ADLs. -GOAL MET Discharge Goals: Time Frame: 4-20-19 1.  Low back pain is 1-2/10 at rest and less than 3-4/10 with chores to allow increased home ADL ability and working in yard 2. Trunk range of motion is at least 75% in all ranges to allow most personal care issues including washing and dressing and driving and helping at [employer] 3. Independent ambulation with no antalgic gait to allow walking community distances 4. LE quad and hamstring strength is at least 3+ to 4-/5 to allow minimal falls/buckling risk 5. Able to walk/stand for greater than 2 hours 6. Outcome measure score is improved from 11/50 to 6/50 7. Rehabilitation Potential For Stated Goals: Good/FAIR-PATIENT NEEDS TO MAKE TIME FOR THERAPY/LEG STRENGTHENING TO PREVENT FALLS Regarding Jake Mendez's therapy, I certify that the treatment plan above will be carried out by a therapist or under their direction. Thank you for this referral, Chrissy Strauss PT Referring Physician Signature: NAGA Joiner            Date The information in this section was collected on 2-19-19 (except where otherwise noted). HISTORY:  
History of Present Injury/Illness (Reason for Referral): Insidious B low back pain with radiculopathy down L leg -LE weakness with falls precautions Past Medical History/Comorbidities:  
Mr. Avi Low  has a past medical history of Acute ulcer of stomach, Arthritis, BCC (basal cell carcinoma of skin), BPH (benign prostatic hyperplasia), CAD (coronary artery disease), Dental disorder, Depression, Diabetes ([address] HLD (hyperlipidemia), HTN (hypertension), Melanoma ([address] MI (myocardial infarction) ([address] and Prediabetes. Mr. Avi Low  has a past surgical history that includes hx cervical fusion; hx other surgical; hx other surgical (2014); hx hernia repair (Right); and pr cardiac surg procedure unlist (1997). Social History/Living Environment:  
  
Prior Level of Function/Work/Activity: 
Independent with home ADLs and walking Ambulatory/Rehab Services H2 Model Falls Risk Assessment Risk Factors: (1)  Gender [Male] Ability to Rise from Chair: 
     (1)  Pushes up, successful in one attempt Falls Prevention Plan: No modifications necessary Total: (5 or greater = High Risk): 2  
 ©2010 [facility] [place] States Patent #[id number]. Federal Law prohibits the replication, distribution or use without written permission from [facility] Current Medications:   
  
Current Outpatient Medications:  
  metFORMIN (GLUCOPHAGE) 500 mg tablet, TAKE 1 TABLET BY MOUTH ONCE DAILY WITH BREAKFAST, Disp: 90 Tab, Rfl: 1 
  lisinopril (PRINIVIL, ZESTRIL) 20 mg tablet, Take 1 Tab by mouth daily. , Disp: 90 Tab, Rfl: 0 
  citalopram (CELEXA) 20 mg tablet, Take 1 Tab by mouth daily. , Disp: 90 Tab, Rfl: 0 
  varenicline (CHANTIX STARTER GAVIN) 0.5 mg (11)- 1 mg (42) DsPk, Take 1 mg by mouth two (2) times daily (after meals). , Disp: 1 Dose Pack, Rfl: 0 
  fenofibrate (LOFIBRA) 160 mg tablet, TAKE 1 TABLET BY MOUTH ONCE DAILY, Disp: 90 Tab, Rfl: 1 
  atorvastatin (LIPITOR) 80 mg tablet, TAKE 1 TABLET BY MOUTH ONCE DAILY AT BEDTIME, Disp: 90 Tab, Rfl: 1 
  traZODone (DESYREL) 50 mg tablet, Take 1 Tab by mouth nightly., Disp: 90 Tab, Rfl: 1 
  tamsulosin (FLOMAX) 0.4 mg capsule, TAKE ONE CAPSULE BY MOUTH ONCE DAILY, Disp: 90 Cap, Rfl: 3 
  amLODIPine (NORVASC) 10 mg tablet, TAKE ONE TABLET BY MOUTH ONCE DAILY, Disp: 90 Tab, Rfl: 3 
  diphenhydrAMINE-acetaminophen (TYLENOL PM EXTRA STRENGTH)  mg tab, Take 1 Tab by mouth nightly as needed. , Disp: , Rfl:  
  multivit-min-FA-lycopen-lutein (CENTRUM SILVER MEN) 300-600-300 mcg tab, Take 1 Tab by mouth nightly., Disp: , Rfl:  
  aspirin delayed-release 81 mg tablet, Take 81 mg by mouth nightly., Disp: , Rfl:   
Date Last Reviewed:  3/19/2019 Number of Personal Factors/Comorbidities that affect the Plan of Care: 
(patient has a history of melanoma, HTN, cervical fusion and diabetes) 1-2: MODERATE COMPLEXITY EXAMINATION:  
Observation/Orthostatic Postural Assessment:   
      Rounded shoulders with forward head on neck posture -posteriorly rotated L innominant Palpation:   
     Very tight R lumbar paraspinals and B gluteus medius and tender at L L4-S1 -tight B piriformis and tight hamstrings with cramping ROM:   
      LE range of motion is wfl  
 
Trunk range of motion- 
 
Flexion-75% with less hamstring tightness Extension -33 % with less pain B sidebending -66% with less  pain B rotation-85% Strength: Ankles are 3+ to 4-/5 B quads and hamstrings are 4-/5 B hip flexors are  4-/5 Special Tests:   
     Negative spring/compression/valsalva/stork-positive straight leg raise on L with tight hamstrings Neurological Screen: 
 
Radiating pain  Along L L5 pathway in L LE-patient complains of burning and numbness after extended standing -this is resolved with steroid injection Balance:   
    Intact Mental Status:   
      Alert and oriented Sensation:  
      Intact to light touch Body Structures Involved: 1. Bones 2. Joints 3. Muscles Body Functions Affected: 1. Sensory/Pain 2. Neuromusculoskeletal 
3. Movement Related Activities and Participation Affected: 1. Learning and Applying Knowledge 2. General Tasks and Demands 3. Mobility 4. Self Care 5. Domestic Life Number of elements (examined above) that affect the Plan of Care: 3: MODERATE COMPLEXITY CLINICAL PRESENTATION:  
Presentation: Evolving clinical presentation with changing clinical characteristics: MODERATE COMPLEXITY CLINICAL DECISION MAKING:  
Outcome Measure: Tool Used: Modified Oswestry Low Back Pain Questionnaire Score:  Initial: 11/50  Most Recent: 2/50 (Date: 3-19-19-- ) Interpretation of Score: Each section is scored on a 0-5 scale, 5 representing the greatest disability. The scores of each section are added together for a total score of 50. Medical Necessity: · Patient is expected to demonstrate progress in strength, range of motion, balance, coordination and functional technique to increase independence with ADLs  and improve safety during walking and transfers and chores . · Skilled intervention continues to be required due to B low back pain with radiculopathy and weak LEs are affecting function and gait . Reason for Services/Other Comments: 
· Patient continues to require modification of therapeutic interventions to increase complexity of exercises. Use of outcome tool(s) and clinical judgement create a POC that gives a: 
(outcome measure illustrates up to 39% impairment ) Questionable prediction of patient's progress: MODERATE COMPLEXITY  
  
 
 
 
TREATMENT:  
(In addition to Assessment/Re-Assessment sessions the following treatments were rendered) Pre-treatment Symptoms/Complaints:  Patient reported feeling much better since injection as he has been able to redo ceilings in house (scrape/prime/paint) and plans to play golf this Friday Pain: Initial:  
Pain Intensity 1: 0 Pain Location 1: Back, Leg 
Pain Orientation 1: Left, Lower Pain Intervention(s) 1: Cold pack, Exercise, Heat applied  Post Session: 0/10 -decreased spasm as well Therapeutic Exercise: ( ):  x 30 minutes Exercises per grid below to improve mobility, strength, balance and coordination. Required minimal verbal cues to promote proper body alignment, promote proper body posture and promote proper body mechanics. Progressed resistance, range, repetitions and complexity of movement as indicated. Date: 
3-5-19 Date: 
3-19-19 Date: 
2/28/19 Activity/Exercise Parameters Parameters Parameters Nu step    Level 4 x 10 mins Calf stretch   4 x 30 sec hold on incline I T band stretch   4x30 sec hold with green strap Knee to chest  4 x 30 seconds with towel  4 x 30 seconds  4x30 sec hold Hamstring stretch 4 x 30 seconds with belt 4 x 30 seconds  4x30 sec hold with strap Piriformis stretch 4 x 30 seconds  6 x 30 seconds  4x30 sec hold Sit to stand    X 10 reps Seated hamstring curls with eccentric knee extension   Green strap x 10 reps Abdominal bracing X 10 with 3 second hold 1 x 10 with 3 second hold X 10 reps x 10 sec hold Gluteal sets X 10 with 3 second hold 1 x 10 with 3 second hold Hip adduction X 10 with theraball 2 x 10 with theraball Hip abduction X 10 with black band 2 x 10 with black band Gluteus medius -hip abduction/hip extension/foot int rot  2 x 5     
clamshells  X 10 to each LE with black band Manual Therapy (      Soft tissue mobilization x 15 minutes to left  lumbosacral region & left gluteus medius in  right side lying to decrease tightness -effleurage and petrissage for tightness Level pelvis Therapeutic Modalities: for pain and edema Ultrasound x 10 minutes to bilateral lumbars and L gluteus medius while in R sidelying position at 1.5 w/cm for tightness/inflammation HEP: As above; handouts given to patient for all exercises. Treatment/Session Assessment:   
· Response to Treatment:  Very good progress -patient feels well enough to play golf on Friday · Compliance with Program/Exercises: compliant most of the time. · Recommendations/Intent for next treatment session: \"Next visit will focus on advancements to more challenging activities\". continue lumbar rehab with LE strengthening Total Treatment Duration: 55  minutes PT Patient Time In/Time Out Time In: 0900 Time Out: 1000 Chai Reyes PT

## 2019-03-21 ENCOUNTER — HOSPITAL ENCOUNTER (OUTPATIENT)
Dept: PHYSICAL THERAPY | Age: 69
Discharge: HOME | End: 2019-03-21
Payer: MEDICARE

## 2019-03-21 PROCEDURE — 97140 MANUAL THERAPY 1/> REGIONS: CPT

## 2019-03-21 PROCEDURE — 97110 THERAPEUTIC EXERCISES: CPT

## 2019-03-21 PROCEDURE — 97035 APP MDLTY 1+ULTRASOUND EA 15: CPT

## 2019-03-21 NOTE — PROGRESS NOTES
Duke Soto : 1950 [place] Secondary: Maninder Mckee 76 at [address], [place], [address] [place] [address] [phone]   [phone] OUTPATIENT PHYSICAL THERAPY:Daily Note 3/21/2019 ICD-10: Treatment Diagnosis: M54.5 low back pain and M51.36 other intervertebral disc degeneration, lumbar region and M54.16 radiculopathy , lumbar region and R26.89 other abnormalities of gait and mobility Precautions: FALLS PRECAUTIONS-VERY WEAK QUADS -INCREASED PAIN WITH TRUNK FLEXION/COLD MAKES PAIN WORSE Allergies: Patient has no known allergies. MD Orders: evaluate and treat  MEDICAL/REFERRING DIAGNOSIS: 
Other intervertebral disc degeneration, lumbar region [M51.36] Radiculopathy, lumbar region [M54.16] Low back pain [M54.5] DATE OF ONSET: on and off x 6-7 years -worse the last year REFERRING PHYSICIAN: Qing Charles PA 
RETURN PHYSICIAN APPOINTMENT:19 INITIAL ASSESSMENT:  Mr. Di Joshi is a 77 y.o. male presenting to physical therapy with complaints of insidious B low back pain with radicular symptoms down L leg to L foot with incidences of L leg going numb particularly after long standing  -patient ambulates independently with minimal antalgic gait -rounded shoulders with forward head on neck posture and flattened lumbar curve-posteriorly rotated L innominant is noted-very tight B lumbar paraspinals with increased tightness on R side-point tender at L L4-S1 region -tight gluteus medius and piriformis musculature  - -decreased trunk mobility with increased pain in flexion/extension/B sidebending -pain level is 5/10 but can get as high as 7-8/10-history of fallen secondary to weak/numb L LE per patient -- very weak quads and hamstrings with increased pain/cramping with muscle testing-recent history of xrays and mri per patient  (mri performed on 19 per patient ) -very good candidate for skilled physical therapy to include manual therapy/ pain modalities as needed/therapeutic exercises and hot/cold modalities and proper  training Patient has been seen for 6 visits of low back rehab with good progress from 2-19-19 to 3-19-19-decreased lumbar and gluteus medius spasm with no radicular symptoms-pain level is 0/10 after recent steroid injection -independent ambulation with minimal antalgic gait-independent wit home exercise program-trunk range of motion is improved but still having tight hamstrings -overall good progress-low back pain outcome measure score is improved from 11/50 to 2/50 -continue current regimen with LE strengthening -motivated patient -pleasant gentleman to work with PROBLEM LIST (Impacting functional limitations): 1. Decreased Strength 2. Decreased ADL/Functional Activities 3. Decreased Transfer Abilities 4. Decreased Ambulation Ability/Technique 5. Decreased Balance 6. Increased Pain 7. Decreased Flexibility/Joint Mobility INTERVENTIONS PLANNED: 
1. Electrical Stimulation 2. Heat 3. Home Exercise Program (HEP) 4. Manual Therapy 5. Therapeutic Exercise/Strengthening 6. Ultrasound (US)  
TREATMENT PLAN: 
Effective Dates: 2/19/2019 TO 4/20/2019 (60 days). Frequency/Duration: 2 times a week for 60 Days GOALS: (Goals have been discussed and agreed upon with patient.) Short-Term Functional Goals: Time Frame: 3-5-19 1. Low back pain is less than 5/10 at rest and less than 7-8/10 with standing/walking for greater than 2 hours -GOAL MET 2. Trunk flexion is improved by 10% to allow increased personal care ability/putting on shoes and socks -GOAL MET 3. Independent ambulation with minimal to no antalgic gait -GOAL MET Instruction in exercise program to allow increased ADLs. -GOAL MET Discharge Goals: Time Frame: 4-20-19 1.  Low back pain is 1-2/10 at rest and less than 3-4/10 with chores to allow increased home ADL ability and working in yard 2. Trunk range of motion is at least 75% in all ranges to allow most personal care issues including washing and dressing and driving and helping at [employer] 3. Independent ambulation with no antalgic gait to allow walking community distances 4. LE quad and hamstring strength is at least 3+ to 4-/5 to allow minimal falls/buckling risk 5. Able to walk/stand for greater than 2 hours 6. Outcome measure score is improved from 11/50 to 6/50 7. Rehabilitation Potential For Stated Goals: Good/FAIR-PATIENT NEEDS TO MAKE TIME FOR THERAPY/LEG STRENGTHENING TO PREVENT FALLS Regarding Jake Mendez's therapy, I certify that the treatment plan above will be carried out by a therapist or under their direction. Thank you for this referral, Vince Cantor PTA Referring Physician Signature: NAGA Joiner            Date The information in this section was collected on 2-19-19 (except where otherwise noted). HISTORY:  
History of Present Injury/Illness (Reason for Referral): Insidious B low back pain with radiculopathy down L leg -LE weakness with falls precautions Past Medical History/Comorbidities:  
Mr. Avi Low  has a past medical history of Acute ulcer of stomach, Arthritis, BCC (basal cell carcinoma of skin), BPH (benign prostatic hyperplasia), CAD (coronary artery disease), Dental disorder, Depression, Diabetes ([address] HLD (hyperlipidemia), HTN (hypertension), Melanoma ([address] MI (myocardial infarction) ([address] and Prediabetes. Mr. Avi Low  has a past surgical history that includes hx cervical fusion; hx other surgical; hx other surgical (2014); hx hernia repair (Right); and pr cardiac surg procedure unlist (1997). Social History/Living Environment:  
  
Prior Level of Function/Work/Activity: 
Independent with home ADLs and walking Ambulatory/Rehab Services H2 Model Falls Risk Assessment Risk Factors: (1)  Gender [Male] Ability to Rise from Chair: 
     (1)  Pushes up, successful in one attempt Falls Prevention Plan: No modifications necessary Total: (5 or greater = High Risk): 2  
 ©2010 [facility] [address] [place] [address] [place] Patent #[id number]. Federal Law prohibits the replication, distribution or use without written permission from [facility] Current Medications:   
  
Current Outpatient Medications:  
  metFORMIN (GLUCOPHAGE) 500 mg tablet, TAKE 1 TABLET BY MOUTH ONCE DAILY WITH BREAKFAST, Disp: 90 Tab, Rfl: 1 
  lisinopril (PRINIVIL, ZESTRIL) 20 mg tablet, Take 1 Tab by mouth daily. , Disp: 90 Tab, Rfl: 0 
  citalopram (CELEXA) 20 mg tablet, Take 1 Tab by mouth daily. , Disp: 90 Tab, Rfl: 0 
  varenicline (CHANTIX STARTER GAVIN) 0.5 mg (11)- 1 mg (42) DsPk, Take 1 mg by mouth two (2) times daily (after meals). , Disp: 1 Dose Pack, Rfl: 0 
  fenofibrate (LOFIBRA) 160 mg tablet, TAKE 1 TABLET BY MOUTH ONCE DAILY, Disp: 90 Tab, Rfl: 1 
  atorvastatin (LIPITOR) 80 mg tablet, TAKE 1 TABLET BY MOUTH ONCE DAILY AT BEDTIME, Disp: 90 Tab, Rfl: 1 
  traZODone (DESYREL) 50 mg tablet, Take 1 Tab by mouth nightly., Disp: 90 Tab, Rfl: 1 
  tamsulosin (FLOMAX) 0.4 mg capsule, TAKE ONE CAPSULE BY MOUTH ONCE DAILY, Disp: 90 Cap, Rfl: 3 
  amLODIPine (NORVASC) 10 mg tablet, TAKE ONE TABLET BY MOUTH ONCE DAILY, Disp: 90 Tab, Rfl: 3 
  diphenhydrAMINE-acetaminophen (TYLENOL PM EXTRA STRENGTH)  mg tab, Take 1 Tab by mouth nightly as needed. , Disp: , Rfl:  
  multivit-min-FA-lycopen-lutein (CENTRUM SILVER MEN) 300-600-300 mcg tab, Take 1 Tab by mouth nightly., Disp: , Rfl:  
  aspirin delayed-release 81 mg tablet, Take 81 mg by mouth nightly., Disp: , Rfl:   
Date Last Reviewed:  3/21/2019 Number of Personal Factors/Comorbidities that affect the Plan of Care: 
(patient has a history of melanoma, HTN, cervical fusion and diabetes) 1-2: MODERATE COMPLEXITY EXAMINATION:  
Observation/Orthostatic Postural Assessment:   
      Rounded shoulders with forward head on neck posture -posteriorly rotated L innominant Palpation:   
     Very tight R lumbar paraspinals and B gluteus medius and tender at L L4-S1 -tight B piriformis and tight hamstrings with cramping ROM:   
      LE range of motion is wfl  
 
Trunk range of motion- 
 
Flexion-75% with less hamstring tightness Extension -33 % with less pain B sidebending -66% with less  pain B rotation-85% Strength: Ankles are 3+ to 4-/5 B quads and hamstrings are 3+/5 B hip flexors are 3+ to 4-/5 Increased pain and cramping with manual muscle testing Special Tests:   
     Negative spring/compression/valsalva/stork-positive straight leg raise on L with tight hamstrings Neurological Screen: 
 
Radiating pain  Along L L5 pathway in L LE-patient complains of burning and numbness after extended standing -this is resolved with steroid injection Balance:   
    Intact Mental Status:   
      Alert and oriented Sensation:  
      Intact to light touch Body Structures Involved: 1. Bones 2. Joints 3. Muscles Body Functions Affected: 1. Sensory/Pain 2. Neuromusculoskeletal 
3. Movement Related Activities and Participation Affected: 1. Learning and Applying Knowledge 2. General Tasks and Demands 3. Mobility 4. Self Care 5. Domestic Life Number of elements (examined above) that affect the Plan of Care: 3: MODERATE COMPLEXITY CLINICAL PRESENTATION:  
Presentation: Evolving clinical presentation with changing clinical characteristics: MODERATE COMPLEXITY CLINICAL DECISION MAKING:  
Outcome Measure: Tool Used: Modified Oswestry Low Back Pain Questionnaire Score:  Initial: 11/50  Most Recent: 2/50 (Date: 3-19-19-- ) Interpretation of Score: Each section is scored on a 0-5 scale, 5 representing the greatest disability.   The scores of each section are added together for a total score of 50. Medical Necessity:  
· Patient is expected to demonstrate progress in strength, range of motion, balance, coordination and functional technique to increase independence with ADLs  and improve safety during walking and transfers and chores . · Skilled intervention continues to be required due to B low back pain with radiculopathy and weak LEs are affecting function and gait . Reason for Services/Other Comments: 
· Patient continues to require modification of therapeutic interventions to increase complexity of exercises. Use of outcome tool(s) and clinical judgement create a POC that gives a: 
(outcome measure illustrates up to 39% impairment ) Questionable prediction of patient's progress: MODERATE COMPLEXITY  
  
 
 
 
TREATMENT:  
(In addition to Assessment/Re-Assessment sessions the following treatments were rendered) Pre-treatment Symptoms/Complaints:  Patient reports no pain today. States he is a little sore  From going up and down ladder Pain: Initial:  
Pain Intensity 1: 0 Pain Location 1: Back  Post Session: 0/10 -decreased spasm as well Therapeutic Exercise: (15 Minutes): Exercises per grid below to improve mobility, strength, balance and coordination. Required minimal verbal cues to promote proper body alignment, promote proper body posture and promote proper body mechanics. Progressed resistance, range, repetitions and complexity of movement as indicated. Date: 
3-5-19 Date: 
3-19-19 Date: 
3-21-19 Activity/Exercise Parameters Parameters Parameters Nu step Calf stretch I T band stretch Knee to chest  4 x 30 seconds with towel  4 x 30 seconds  4 x 30 sec Hamstring stretch 4 x 30 seconds with belt 4 x 30 seconds  Strap 4 x 30 sec Piriformis stretch 4 x 30 seconds  6 x 30 seconds  5 x 30 sec Sit to stand Seated hamstring curls with eccentric knee extension Abdominal bracing X 10 with 3 second hold 1 x 10 with 3 second hold Gluteal sets X 10 with 3 second hold 1 x 10 with 3 second hold Hip adduction X 10 with theraball 2 x 10 with theraball Hip abduction X 10 with black band 2 x 10 with black band Black 3 x 10 with TA  
Gluteus medius -hip abduction/hip extension/foot int rot  2 x 5     
clamshells  X 10 to each LE with black band reviewed Manual Therapy (    Soft Tissue Mobilization Duration Duration: 15 Minutes Soft tissue mobilization to left  lumbosacral region , left gluteus medius and piriformis in  right side lying to decrease tightness -effleurage and petrissage for tightness Therapeutic Modalities: for pain and edema Lumbo-Sacral Spine Ultrasound Delivery: Continuous Frequency: 1 mHz Intensity: 1.5 ruiz/cm sq Duration : 15 minutes Patient Position: Lying right side HEP: As above; handouts given to patient for all exercises. Treatment/Session Assessment:   
· Response to Treatment:  Very good progress -  
· Compliance with Program/Exercises: compliant most of the time. · Recommendations/Intent for next treatment session: \"Next visit will focus on advancements to more challenging activities\". Total Treatment Duration: 45  minutes PT Patient Time In/Time Out Time In: 0900 Time Out: 2068 Trang Goff, PTA

## 2019-03-26 ENCOUNTER — HOSPITAL ENCOUNTER (OUTPATIENT)
Dept: PHYSICAL THERAPY | Age: 69
Discharge: HOME | End: 2019-03-26
Payer: MEDICARE

## 2019-03-26 PROCEDURE — 97035 APP MDLTY 1+ULTRASOUND EA 15: CPT

## 2019-03-26 PROCEDURE — 97140 MANUAL THERAPY 1/> REGIONS: CPT

## 2019-03-26 PROCEDURE — 97110 THERAPEUTIC EXERCISES: CPT

## 2019-03-26 NOTE — PROGRESS NOTES
Donnella Ahumada : 1950 [place] Secondary: Maninder Mckee [address] at [address], [address] [place] [address] Street [phone]   [phone] OUTPATIENT PHYSICAL THERAPY:Daily Note 3/26/2019 ICD-10: Treatment Diagnosis: M54.5 low back pain and M51.36 other intervertebral disc degeneration, lumbar region and M54.16 radiculopathy , lumbar region and R26.89 other abnormalities of gait and mobility Precautions: FALLS PRECAUTIONS-VERY WEAK QUADS -INCREASED PAIN WITH TRUNK FLEXION/COLD MAKES PAIN WORSE Allergies: Patient has no known allergies. MD Orders: evaluate and treat  MEDICAL/REFERRING DIAGNOSIS: 
Other intervertebral disc degeneration, lumbar region [M51.36] Radiculopathy, lumbar region [M54.16] Low back pain [M54.5] DATE OF ONSET: on and off x 6-7 years -worse the last year REFERRING PHYSICIAN: Qing Charles PA 
RETURN PHYSICIAN APPOINTMENT:19 INITIAL ASSESSMENT:  Mr. Pam Scanlon is a 77 y.o. male presenting to physical therapy with complaints of insidious B low back pain with radicular symptoms down L leg to L foot with incidences of L leg going numb particularly after long standing  -patient ambulates independently with minimal antalgic gait -rounded shoulders with forward head on neck posture and flattened lumbar curve-posteriorly rotated L innominant is noted-very tight B lumbar paraspinals with increased tightness on R side-point tender at L L4-S1 region -tight gluteus medius and piriformis musculature  - -decreased trunk mobility with increased pain in flexion/extension/B sidebending -pain level is 5/10 but can get as high as 7-8/10-history of fallen secondary to weak/numb L LE per patient -- very weak quads and hamstrings with increased pain/cramping with muscle testing-recent history of xrays and mri per patient  (mri performed on 19 per patient ) -very good candidate for skilled physical therapy to include manual therapy/ pain modalities as needed/therapeutic exercises and hot/cold modalities and proper  training Patient has been seen for 8 visits of low back rehab with very good progress from 2-19-19 to 3-26-19-decreased lumbar and gluteus medius spasm with no radicular symptoms-pain level is 0/10 after recent steroid injection -independent ambulation with minimal antalgic gait-increased LE strength to 4-/5 -independent with home exercise program-trunk range of motion is improved to wfl  but still having tight hamstrings -overall good progress-low back pain outcome measure score is improved from 11/50 to 2/50 -continue current regimen with LE strengthening -motivated patient -pleasant gentleman to work with PROBLEM LIST (Impacting functional limitations): 1. Decreased Strength 2. Decreased ADL/Functional Activities 3. Decreased Transfer Abilities 4. Decreased Ambulation Ability/Technique 5. Decreased Balance 6. Increased Pain 7. Decreased Flexibility/Joint Mobility INTERVENTIONS PLANNED: 
1. Electrical Stimulation 2. Heat 3. Home Exercise Program (HEP) 4. Manual Therapy 5. Therapeutic Exercise/Strengthening 6. Ultrasound (US)  
TREATMENT PLAN: 
Effective Dates: 2/19/2019 TO 4/20/2019 (60 days). Frequency/Duration: 2 times a week for 60 Days GOALS: (Goals have been discussed and agreed upon with patient.) Short-Term Functional Goals: Time Frame: 3-5-19 1. Low back pain is less than 5/10 at rest and less than 7-8/10 with standing/walking for greater than 2 hours -GOAL MET 2. Trunk flexion is improved by 10% to allow increased personal care ability/putting on shoes and socks -GOAL MET 3. Independent ambulation with minimal to no antalgic gait -GOAL MET Instruction in exercise program to allow increased ADLs. -GOAL MET Discharge Goals: Time Frame: 4-20-19 1. Low back pain is 1-2/10 at rest and less than 3-4/10 with chores to allow increased home ADL ability and working in yard 2. Trunk range of motion is at least 75% in all ranges to allow most personal care issues including washing and dressing and driving and helping at [employer] 3. Independent ambulation with no antalgic gait to allow walking community distances 4. LE quad and hamstring strength is at least 3+ to 4-/5 to allow minimal falls/buckling risk 5. Able to walk/stand for greater than 2 hours 6. Outcome measure score is improved from 11/50 to 6/50 7. Rehabilitation Potential For Stated Goals: Good/FAIR-PATIENT NEEDS TO MAKE TIME FOR THERAPY/LEG STRENGTHENING TO PREVENT FALLS Regarding Juan Carlos Mendez's therapy, I certify that the treatment plan above will be carried out by a therapist or under their direction. Thank you for this referral, Antonella Mckinney PT Referring Physician Signature: NAGA Rasheed            Date The information in this section was collected on 2-19-19 (except where otherwise noted). HISTORY:  
History of Present Injury/Illness (Reason for Referral): Insidious B low back pain with radiculopathy down L leg -LE weakness with falls precautions Past Medical History/Comorbidities:  
Mr. Dustin Llanos  has a past medical history of Acute ulcer of stomach, Arthritis, BCC (basal cell carcinoma of skin), BPH (benign prostatic hyperplasia), CAD (coronary artery disease), Dental disorder, Depression, Diabetes ([address] HLD (hyperlipidemia), HTN (hypertension), Melanoma ([address] MI (myocardial infarction) ([address] and Prediabetes. Mr. Dustin Llanos  has a past surgical history that includes hx cervical fusion; hx other surgical; hx other surgical (2014); hx hernia repair (Right); and pr cardiac surg procedure unlist (1997). Social History/Living Environment:  
  
Prior Level of Function/Work/Activity: 
Independent with home ADLs and walking Ambulatory/Rehab Services H2 Model Falls Risk Assessment Risk Factors: 
     (1)  Gender [Male] Ability to Rise from Chair: 
     (1)  Pushes up, successful in one attempt Falls Prevention Plan: No modifications necessary Total: (5 or greater = High Risk): 2  
 ©2010 [facility] [address] [place], [place] [address]on \[facility]\"" Patent #[id number]. Federal Law prohibits the replication, distribution or use without written permission from [facility] [employer] Current Medications:   
  
Current Outpatient Medications:  
  metFORMIN (GLUCOPHAGE) 500 mg tablet, TAKE 1 TABLET BY MOUTH ONCE DAILY WITH BREAKFAST, Disp: 90 Tab, Rfl: 1 
  lisinopril (PRINIVIL, ZESTRIL) 20 mg tablet, Take 1 Tab by mouth daily. , Disp: 90 Tab, Rfl: 0 
  citalopram (CELEXA) 20 mg tablet, Take 1 Tab by mouth daily. , Disp: 90 Tab, Rfl: 0 
  varenicline (CHANTIX STARTER GAVIN) 0.5 mg (11)- 1 mg (42) DsPk, Take 1 mg by mouth two (2) times daily (after meals). , Disp: 1 Dose Pack, Rfl: 0 
  fenofibrate (LOFIBRA) 160 mg tablet, TAKE 1 TABLET BY MOUTH ONCE DAILY, Disp: 90 Tab, Rfl: 1 
  atorvastatin (LIPITOR) 80 mg tablet, TAKE 1 TABLET BY MOUTH ONCE DAILY AT BEDTIME, Disp: 90 Tab, Rfl: 1 
  traZODone (DESYREL) 50 mg tablet, Take 1 Tab by mouth nightly., Disp: 90 Tab, Rfl: 1 
  tamsulosin (FLOMAX) 0.4 mg capsule, TAKE ONE CAPSULE BY MOUTH ONCE DAILY, Disp: 90 Cap, Rfl: 3 
  amLODIPine (NORVASC) 10 mg tablet, TAKE ONE TABLET BY MOUTH ONCE DAILY, Disp: 90 Tab, Rfl: 3 
  diphenhydrAMINE-acetaminophen (TYLENOL PM EXTRA STRENGTH)  mg tab, Take 1 Tab by mouth nightly as needed. , Disp: , Rfl:  
  multivit-min-FA-lycopen-lutein (CENTRUM SILVER MEN) 300-600-300 mcg tab, Take 1 Tab by mouth nightly., Disp: , Rfl:  
  aspirin delayed-release 81 mg tablet, Take 81 mg by mouth nightly., Disp: , Rfl:   
Date Last Reviewed:  3/26/2019 Number of Personal Factors/Comorbidities that affect the Plan of Care: (patient has a history of melanoma, HTN, cervical fusion and diabetes) 1-2: MODERATE COMPLEXITY EXAMINATION:  
Observation/Orthostatic Postural Assessment:   
      Rounded shoulders with forward head on neck posture -posteriorly rotated L innominant Palpation:   
     Very tight R lumbar paraspinals and B gluteus medius and tender at L L4-S1 -tight B piriformis and tight hamstrings with cramping ROM:   
      LE range of motion is wfl  
 
Trunk range of motion- 
 
Flexion-75% with less hamstring tightness Extension -33 % with less pain B sidebending -66% with less  pain B rotation-85% Strength: Ankles are  4-/5 B quads and hamstrings are 4-/5 B hip flexors are  4-/5 Special Tests:   
     Negative spring/compression/valsalva/stork-positive straight leg raise on L with tight hamstrings Neurological Screen: 
 
Radiating pain  Along L L5 pathway in L LE-patient complains of burning and numbness after extended standing -this is resolved with steroid injection Balance:   
    Intact Mental Status:   
      Alert and oriented Sensation:  
      Intact to light touch Body Structures Involved: 1. Bones 2. Joints 3. Muscles Body Functions Affected: 1. Sensory/Pain 2. Neuromusculoskeletal 
3. Movement Related Activities and Participation Affected: 1. Learning and Applying Knowledge 2. General Tasks and Demands 3. Mobility 4. Self Care 5. Domestic Life Number of elements (examined above) that affect the Plan of Care: 3: MODERATE COMPLEXITY CLINICAL PRESENTATION:  
Presentation: Evolving clinical presentation with changing clinical characteristics: MODERATE COMPLEXITY CLINICAL DECISION MAKING:  
Outcome Measure: Tool Used: Modified Oswestry Low Back Pain Questionnaire Score:  Initial: 11/50  Most Recent: 2/50 (Date: 3-19-19-- ) Interpretation of Score: Each section is scored on a 0-5 scale, 5 representing the greatest disability. The scores of each section are added together for a total score of 50. Medical Necessity:  
· Patient is expected to demonstrate progress in strength, range of motion, balance, coordination and functional technique to increase independence with ADLs  and improve safety during walking and transfers and chores . · Skilled intervention continues to be required due to B low back pain with radiculopathy and weak LEs are affecting function and gait . Reason for Services/Other Comments: 
· Patient continues to require modification of therapeutic interventions to increase complexity of exercises. Use of outcome tool(s) and clinical judgement create a POC that gives a: 
(outcome measure illustrates up to 39% impairment ) Questionable prediction of patient's progress: MODERATE COMPLEXITY  
  
 
 
 
TREATMENT:  
(In addition to Assessment/Re-Assessment sessions the following treatments were rendered) Pre-treatment Symptoms/Complaints:  Patient reports no pain today and he feels well enough to paint house, cook for his [employer], and play golf (advised not to over do it if possible ) Pain: Initial:  
Pain Intensity 1: 0 Pain Location 1: Back Pain Orientation 1: Left, Lower Pain Intervention(s) 1: Exercise  Post Session: 0/10 -decreased spasm -less tight hamstrings Therapeutic Exercise: ( ): x 35 minutes Exercises per grid below to improve mobility, strength, balance and coordination. Required minimal verbal cues to promote proper body alignment, promote proper body posture and promote proper body mechanics. Progressed resistance, range, repetitions and complexity of movement as indicated. Date: 
3-26-19 Date: 
3-19-19 Date: 
3-21-19 Activity/Exercise Parameters Parameters Parameters Nu step Calf stretch I T band stretch Knee to chest  4 x 30 seconds with towel  4 x 30 seconds  4 x 30 sec Hamstring stretch 4 x 30 seconds with belt 4 x 30 seconds  Strap 4 x 30 sec Piriformis stretch 4 x 30 seconds  6 x 30 seconds  5 x 30 sec Sit to stand  2 x 10 with no arm assist    
Standing hip flexion X 15 to each LE with black band Standing hip extension X 15 to each LE with black band Standing hip abduction X 15 to each LE with black band Seated hamstring curls with eccentric knee extension X 20 to each LE with green band Abdominal bracing X 10 with 3 second hold 1 x 10 with 3 second hold Gluteal sets X 10 with 3 second hold 1 x 10 with 3 second hold Hip adduction 2 X 10 with theraball 2 x 10 with theraball Hip abduction 2 X 10 with black band 2 x 10 with black band Black 3 x 10 with TA  
Gluteus medius -hip abduction/hip extension/foot int rot      
clamshells 2 x 10 to each LE with black band X 10 to each LE with black band reviewed Manual Therapy (      Soft tissue mobilization to left  lumbosacral region , left gluteus medius and piriformis in  right side lying to decrease tightness  X 15 minutes-effleurage and petrissage for tightness Therapeutic Modalities: for pain and edema Ultrasound x 10 minutes at 1.5 w/cm to L lumbar and L gluteus medius while in R sidelying position for tightness HEP: As above; handouts given to patient for all exercises. Treatment/Session Assessment:   
· Response to Treatment:  Very good progress - 0/10 pain · Compliance with Program/Exercises: compliant most of the time. · Recommendations/Intent for next treatment session: \"Next visit will focus on advancements to more challenging activities\". continue current regimen -evaluate for possible DC Total Treatment Duration: 60  minutes PT Patient Time In/Time Out Time In: 0900 Time Out: 1000 Moises Arana, PT

## 2019-04-02 ENCOUNTER — HOSPITAL ENCOUNTER (OUTPATIENT)
Dept: PHYSICAL THERAPY | Age: 69
Discharge: HOME | End: 2019-04-02
Payer: MEDICARE

## 2019-04-02 PROCEDURE — 97110 THERAPEUTIC EXERCISES: CPT

## 2019-04-02 PROCEDURE — 97140 MANUAL THERAPY 1/> REGIONS: CPT

## 2019-04-02 PROCEDURE — 97035 APP MDLTY 1+ULTRASOUND EA 15: CPT

## 2019-04-04 ENCOUNTER — APPOINTMENT (OUTPATIENT)
Dept: PHYSICAL THERAPY | Age: 69
End: 2019-04-04
Payer: MEDICARE

## 2019-04-09 ENCOUNTER — APPOINTMENT (OUTPATIENT)
Dept: PHYSICAL THERAPY | Age: 69
End: 2019-04-09
Payer: MEDICARE

## 2019-04-11 ENCOUNTER — APPOINTMENT (OUTPATIENT)
Dept: PHYSICAL THERAPY | Age: 69
End: 2019-04-11
Payer: MEDICARE

## 2019-04-16 ENCOUNTER — APPOINTMENT (OUTPATIENT)
Dept: PHYSICAL THERAPY | Age: 69
End: 2019-04-16
Payer: MEDICARE

## 2019-04-18 ENCOUNTER — APPOINTMENT (OUTPATIENT)
Dept: PHYSICAL THERAPY | Age: 69
End: 2019-04-18
Payer: MEDICARE

## 2019-04-23 ENCOUNTER — APPOINTMENT (OUTPATIENT)
Dept: PHYSICAL THERAPY | Age: 69
End: 2019-04-23
Payer: MEDICARE

## 2019-04-25 ENCOUNTER — APPOINTMENT (OUTPATIENT)
Dept: PHYSICAL THERAPY | Age: 69
End: 2019-04-25
Payer: MEDICARE

## 2019-04-30 ENCOUNTER — APPOINTMENT (OUTPATIENT)
Dept: PHYSICAL THERAPY | Age: 69
End: 2019-04-30
Payer: MEDICARE

## 2019-05-02 ENCOUNTER — APPOINTMENT (OUTPATIENT)
Dept: PHYSICAL THERAPY | Age: 69
End: 2019-05-02

## 2019-08-15 PROBLEM — K40.91 RECURRENT LEFT INGUINAL HERNIA: Status: ACTIVE | Noted: 2019-08-15

## 2019-08-20 RX ORDER — SODIUM CHLORIDE 0.9 % (FLUSH) 0.9 %
5-40 SYRINGE (ML) INJECTION AS NEEDED
Status: CANCELLED | OUTPATIENT
Start: 2019-08-20

## 2019-08-20 RX ORDER — SODIUM CHLORIDE 0.9 % (FLUSH) 0.9 %
5-40 SYRINGE (ML) INJECTION EVERY 8 HOURS
Status: CANCELLED | OUTPATIENT
Start: 2019-08-20

## 2019-09-10 ENCOUNTER — ANESTHESIA EVENT (OUTPATIENT)
Dept: SURGERY | Age: 69
End: 2019-09-10
Payer: MEDICARE

## 2019-09-10 ENCOUNTER — HOSPITAL ENCOUNTER (OUTPATIENT)
Age: 69
Setting detail: OUTPATIENT SURGERY
Discharge: HOME | End: 2019-09-10
Attending: SURGERY | Admitting: SURGERY
Payer: MEDICARE

## 2019-09-10 ENCOUNTER — ANESTHESIA (OUTPATIENT)
Dept: SURGERY | Age: 69
End: 2019-09-10
Payer: MEDICARE

## 2019-09-10 VITALS
SYSTOLIC BLOOD PRESSURE: 135 MMHG | DIASTOLIC BLOOD PRESSURE: 73 MMHG | RESPIRATION RATE: 16 BRPM | HEART RATE: 63 BPM | WEIGHT: 205.7 LBS | HEIGHT: 72 IN | TEMPERATURE: 98.3 F | BODY MASS INDEX: 27.86 KG/M2 | OXYGEN SATURATION: 95 %

## 2019-09-10 DIAGNOSIS — K40.91 RECURRENT LEFT INGUINAL HERNIA: Primary | ICD-10-CM

## 2019-09-10 LAB — GLUCOSE BLD STRIP.AUTO-MCNC: 123 MG/DL (ref 65–100)

## 2019-09-10 PROCEDURE — 74011000250 HC RX REV CODE- 250

## 2019-09-10 PROCEDURE — C1781 MESH (IMPLANTABLE): HCPCS | Performed by: SURGERY

## 2019-09-10 PROCEDURE — 74011250637 HC RX REV CODE- 250/637: Performed by: ANESTHESIOLOGY

## 2019-09-10 PROCEDURE — 76060000034 HC ANESTHESIA 1.5 TO 2 HR: Performed by: SURGERY

## 2019-09-10 PROCEDURE — 76210000021 HC REC RM PH II 0.5 TO 1 HR: Performed by: SURGERY

## 2019-09-10 PROCEDURE — 76010000162 HC OR TIME 1.5 TO 2 HR INTENSV-TIER 1: Performed by: SURGERY

## 2019-09-10 PROCEDURE — 82962 GLUCOSE BLOOD TEST: CPT

## 2019-09-10 PROCEDURE — 77030031139 HC SUT VCRL2 J&J -A: Performed by: SURGERY

## 2019-09-10 PROCEDURE — 77030021678 HC GLIDESCP STAT DISP VERT -B: Performed by: NURSE ANESTHETIST, CERTIFIED REGISTERED

## 2019-09-10 PROCEDURE — 77030018836 HC SOL IRR NACL ICUM -A: Performed by: SURGERY

## 2019-09-10 PROCEDURE — 74011000250 HC RX REV CODE- 250: Performed by: SURGERY

## 2019-09-10 PROCEDURE — 77030008462 HC STPLR SKN PROX J&J -A: Performed by: SURGERY

## 2019-09-10 PROCEDURE — 74011250636 HC RX REV CODE- 250/636: Performed by: ANESTHESIOLOGY

## 2019-09-10 PROCEDURE — 77030002986 HC SUT PROL J&J -A: Performed by: SURGERY

## 2019-09-10 PROCEDURE — 77030019908 HC STETH ESOPH SIMS -A: Performed by: ANESTHESIOLOGY

## 2019-09-10 PROCEDURE — 77030040361 HC SLV COMPR DVT MDII -B: Performed by: SURGERY

## 2019-09-10 PROCEDURE — 77030002933 HC SUT MCRYL J&J -A: Performed by: SURGERY

## 2019-09-10 PROCEDURE — 77030019940 HC BLNKT HYPOTHRM STRY -B: Performed by: ANESTHESIOLOGY

## 2019-09-10 PROCEDURE — 74011250636 HC RX REV CODE- 250/636

## 2019-09-10 PROCEDURE — 77030037088 HC TUBE ENDOTRACH ORAL NSL COVD-A: Performed by: NURSE ANESTHETIST, CERTIFIED REGISTERED

## 2019-09-10 PROCEDURE — 76210000063 HC OR PH I REC FIRST 0.5 HR: Performed by: SURGERY

## 2019-09-10 PROCEDURE — 77030012411 HC DRN WND CARD -A: Performed by: SURGERY

## 2019-09-10 PROCEDURE — 74011250636 HC RX REV CODE- 250/636: Performed by: SURGERY

## 2019-09-10 DEVICE — SELF-GRIPPING POLYESTER MESH,LEFT ANATOMICAL, POLYESTER WITH POLYLACTIC ACID GRIPS
Type: IMPLANTABLE DEVICE | Site: INGUINAL | Status: FUNCTIONAL
Brand: PROGRIP

## 2019-09-10 DEVICE — PHASIX PLUG AND PATCH, LARGE
Type: IMPLANTABLE DEVICE | Site: INGUINAL | Status: FUNCTIONAL
Brand: PHASIX

## 2019-09-10 RX ORDER — NALOXONE HYDROCHLORIDE 0.4 MG/ML
0.2 INJECTION, SOLUTION INTRAMUSCULAR; INTRAVENOUS; SUBCUTANEOUS AS NEEDED
Status: DISCONTINUED | OUTPATIENT
Start: 2019-09-10 | End: 2019-09-10 | Stop reason: HOSPADM

## 2019-09-10 RX ORDER — FENTANYL CITRATE 50 UG/ML
INJECTION, SOLUTION INTRAMUSCULAR; INTRAVENOUS AS NEEDED
Status: DISCONTINUED | OUTPATIENT
Start: 2019-09-10 | End: 2019-09-10 | Stop reason: HOSPADM

## 2019-09-10 RX ORDER — CEFAZOLIN SODIUM/WATER 2 G/20 ML
2 SYRINGE (ML) INTRAVENOUS ONCE
Status: COMPLETED | OUTPATIENT
Start: 2019-09-10 | End: 2019-09-10

## 2019-09-10 RX ORDER — MIDAZOLAM HYDROCHLORIDE 1 MG/ML
2 INJECTION, SOLUTION INTRAMUSCULAR; INTRAVENOUS
Status: DISCONTINUED | OUTPATIENT
Start: 2019-09-10 | End: 2019-09-10 | Stop reason: HOSPADM

## 2019-09-10 RX ORDER — SODIUM CHLORIDE, SODIUM LACTATE, POTASSIUM CHLORIDE, CALCIUM CHLORIDE 600; 310; 30; 20 MG/100ML; MG/100ML; MG/100ML; MG/100ML
75 INJECTION, SOLUTION INTRAVENOUS CONTINUOUS
Status: DISCONTINUED | OUTPATIENT
Start: 2019-09-10 | End: 2019-09-10 | Stop reason: HOSPADM

## 2019-09-10 RX ORDER — ROCURONIUM BROMIDE 10 MG/ML
INJECTION, SOLUTION INTRAVENOUS AS NEEDED
Status: DISCONTINUED | OUTPATIENT
Start: 2019-09-10 | End: 2019-09-10 | Stop reason: HOSPADM

## 2019-09-10 RX ORDER — SODIUM CHLORIDE 0.9 % (FLUSH) 0.9 %
5-40 SYRINGE (ML) INJECTION EVERY 8 HOURS
Status: DISCONTINUED | OUTPATIENT
Start: 2019-09-10 | End: 2019-09-10 | Stop reason: HOSPADM

## 2019-09-10 RX ORDER — GLYCOPYRROLATE 0.2 MG/ML
INJECTION INTRAMUSCULAR; INTRAVENOUS AS NEEDED
Status: DISCONTINUED | OUTPATIENT
Start: 2019-09-10 | End: 2019-09-10 | Stop reason: HOSPADM

## 2019-09-10 RX ORDER — NEOSTIGMINE METHYLSULFATE 1 MG/ML
INJECTION INTRAVENOUS AS NEEDED
Status: DISCONTINUED | OUTPATIENT
Start: 2019-09-10 | End: 2019-09-10 | Stop reason: HOSPADM

## 2019-09-10 RX ORDER — TRAMADOL HYDROCHLORIDE 50 MG/1
100 TABLET ORAL
Qty: 40 TAB | Refills: 0 | Status: SHIPPED | OUTPATIENT
Start: 2019-09-10 | End: 2019-09-15

## 2019-09-10 RX ORDER — OXYCODONE AND ACETAMINOPHEN 5; 325 MG/1; MG/1
1 TABLET ORAL AS NEEDED
Status: DISCONTINUED | OUTPATIENT
Start: 2019-09-10 | End: 2019-09-10 | Stop reason: HOSPADM

## 2019-09-10 RX ORDER — ONDANSETRON 2 MG/ML
INJECTION INTRAMUSCULAR; INTRAVENOUS AS NEEDED
Status: DISCONTINUED | OUTPATIENT
Start: 2019-09-10 | End: 2019-09-10 | Stop reason: HOSPADM

## 2019-09-10 RX ORDER — LIDOCAINE HYDROCHLORIDE 10 MG/ML
0.1 INJECTION INFILTRATION; PERINEURAL AS NEEDED
Status: DISCONTINUED | OUTPATIENT
Start: 2019-09-10 | End: 2019-09-10 | Stop reason: HOSPADM

## 2019-09-10 RX ORDER — LIDOCAINE HYDROCHLORIDE 20 MG/ML
INJECTION, SOLUTION EPIDURAL; INFILTRATION; INTRACAUDAL; PERINEURAL AS NEEDED
Status: DISCONTINUED | OUTPATIENT
Start: 2019-09-10 | End: 2019-09-10 | Stop reason: HOSPADM

## 2019-09-10 RX ORDER — SODIUM CHLORIDE 0.9 % (FLUSH) 0.9 %
5-40 SYRINGE (ML) INJECTION AS NEEDED
Status: DISCONTINUED | OUTPATIENT
Start: 2019-09-10 | End: 2019-09-10 | Stop reason: HOSPADM

## 2019-09-10 RX ORDER — DEXAMETHASONE SODIUM PHOSPHATE 4 MG/ML
INJECTION, SOLUTION INTRA-ARTICULAR; INTRALESIONAL; INTRAMUSCULAR; INTRAVENOUS; SOFT TISSUE AS NEEDED
Status: DISCONTINUED | OUTPATIENT
Start: 2019-09-10 | End: 2019-09-10 | Stop reason: HOSPADM

## 2019-09-10 RX ORDER — PROPOFOL 10 MG/ML
INJECTION, EMULSION INTRAVENOUS AS NEEDED
Status: DISCONTINUED | OUTPATIENT
Start: 2019-09-10 | End: 2019-09-10 | Stop reason: HOSPADM

## 2019-09-10 RX ORDER — BUPIVACAINE HYDROCHLORIDE AND EPINEPHRINE 5; 5 MG/ML; UG/ML
INJECTION, SOLUTION EPIDURAL; INTRACAUDAL; PERINEURAL AS NEEDED
Status: DISCONTINUED | OUTPATIENT
Start: 2019-09-10 | End: 2019-09-10 | Stop reason: HOSPADM

## 2019-09-10 RX ORDER — HYDROMORPHONE HYDROCHLORIDE 2 MG/ML
0.5 INJECTION, SOLUTION INTRAMUSCULAR; INTRAVENOUS; SUBCUTANEOUS
Status: DISCONTINUED | OUTPATIENT
Start: 2019-09-10 | End: 2019-09-10 | Stop reason: HOSPADM

## 2019-09-10 RX ADMIN — Medication 2 G: at 13:11

## 2019-09-10 RX ADMIN — LIDOCAINE HYDROCHLORIDE 60 MG: 20 INJECTION, SOLUTION EPIDURAL; INFILTRATION; INTRACAUDAL; PERINEURAL at 13:06

## 2019-09-10 RX ADMIN — SODIUM CHLORIDE, SODIUM LACTATE, POTASSIUM CHLORIDE, AND CALCIUM CHLORIDE 75 ML/HR: 600; 310; 30; 20 INJECTION, SOLUTION INTRAVENOUS at 11:05

## 2019-09-10 RX ADMIN — ONDANSETRON 4 MG: 2 INJECTION INTRAMUSCULAR; INTRAVENOUS at 14:13

## 2019-09-10 RX ADMIN — GLYCOPYRROLATE 0.4 MG: 0.2 INJECTION INTRAMUSCULAR; INTRAVENOUS at 14:16

## 2019-09-10 RX ADMIN — FENTANYL CITRATE 100 MCG: 50 INJECTION, SOLUTION INTRAMUSCULAR; INTRAVENOUS at 13:06

## 2019-09-10 RX ADMIN — PROPOFOL 200 MG: 10 INJECTION, EMULSION INTRAVENOUS at 13:06

## 2019-09-10 RX ADMIN — FENTANYL CITRATE 50 MCG: 50 INJECTION, SOLUTION INTRAMUSCULAR; INTRAVENOUS at 13:51

## 2019-09-10 RX ADMIN — OXYCODONE HYDROCHLORIDE AND ACETAMINOPHEN 1 TABLET: 5; 325 TABLET ORAL at 14:47

## 2019-09-10 RX ADMIN — ROCURONIUM BROMIDE 5 MG: 10 INJECTION, SOLUTION INTRAVENOUS at 13:51

## 2019-09-10 RX ADMIN — SODIUM CHLORIDE, SODIUM LACTATE, POTASSIUM CHLORIDE, AND CALCIUM CHLORIDE: 600; 310; 30; 20 INJECTION, SOLUTION INTRAVENOUS at 13:20

## 2019-09-10 RX ADMIN — NEOSTIGMINE METHYLSULFATE 3 MG: 1 INJECTION INTRAVENOUS at 14:16

## 2019-09-10 RX ADMIN — FENTANYL CITRATE 50 MCG: 50 INJECTION, SOLUTION INTRAMUSCULAR; INTRAVENOUS at 13:26

## 2019-09-10 RX ADMIN — DEXAMETHASONE SODIUM PHOSPHATE 4 MG: 4 INJECTION, SOLUTION INTRA-ARTICULAR; INTRALESIONAL; INTRAMUSCULAR; INTRAVENOUS; SOFT TISSUE at 13:15

## 2019-09-10 RX ADMIN — ROCURONIUM BROMIDE 40 MG: 10 INJECTION, SOLUTION INTRAVENOUS at 13:06

## 2019-09-10 NOTE — BRIEF OP NOTE
BRIEF OPERATIVE NOTE    Date of Procedure: 9/10/2019   Preoperative Diagnosis: Left inguinal hernia [K40.90]  Postoperative Diagnosis: Left inguinal hernia [K40.90]    Procedure(s): HERNIA INGUINAL REPAIR LEFT WITH MESH  Surgeon(s) and Role:     * Magdy Hernández MD - Primary         Surgical Assistant: NAGA Shepard      Surgical Staff:  Circ-1: Ángela Frank RN  Physician Assistant: NAGA Morrison  Scrub Tech-2: Poppy Atkins  Event Time In Time Out   Incision Start 1326    Incision Close 1420      Anesthesia: General   Estimated Blood Loss: minimal  Specimens: * No specimens in log *   Findings: see operative report   Complications: none  Implants:   Implant Name Type Inv.  Item Serial No.  Lot No. LRB No. Used Action   MESH DEVORA PLUG/PTCH LG -- PHASIX - [id number]  MESH DEVORA PLUG/PTCH LG -- PHASIX  [facility] [id number] Left 1 Implanted   MESH POLYSTR SLF- 12X8 LT -- PROGRIP - [id number]  MESH POLYSTR SLF- 12X8 LT -- PROGRIP  [employer]  SURGICAL [id number] Left 1 Implanted

## 2019-09-10 NOTE — ANESTHESIA PREPROCEDURE EVALUATION
Anesthetic History   No history of anesthetic complications            Review of Systems / Medical History  Nursing notes reviewed and pertinent labs reviewed    Pulmonary        Sleep apnea: No treatment  Smoker         Neuro/Psych         Psychiatric history     Cardiovascular    Hypertension: well controlled          Past MI, CAD, cardiac stents (last placed in 1999) and hyperlipidemia    Exercise tolerance: >4 METS  Comments: Denies recent CP, SOB or Palpitations    Stents in 1999   GI/Hepatic/Renal     GERD: well controlled           Endo/Other    Diabetes: well controlled, type 2         Other Findings   Comments: S/p cervical fusion           Physical Exam    Airway  Mallampati: III  TM Distance: 4 - 6 cm  Neck ROM: decreased range of motion   Mouth opening: Normal    Comments: Extremely limited neck extension Cardiovascular  Regular rate and rhythm,  S1 and S2 normal,  no murmur, click, rub, or gallop  Rhythm: regular           Dental  No notable dental hx       Pulmonary  Breath sounds clear to auscultation               Abdominal  GI exam deferred       Other Findings            Anesthetic Plan    ASA: 3  Anesthesia type: general          Induction: Intravenous  Anesthetic plan and risks discussed with: Patient and Spouse      Glidescope for intubation due to limited neck extension.

## 2019-09-10 NOTE — ANESTHESIA POSTPROCEDURE EVALUATION
Procedure(s): HERNIA INGUINAL REPAIR LEFT WITH MESH.     general    Anesthesia Post Evaluation      Multimodal analgesia: multimodal analgesia used between 6 hours prior to anesthesia start to PACU discharge  Patient location during evaluation: PACU  Patient participation: complete - patient participated  Level of consciousness: awake and alert  Pain management: adequate  Airway patency: patent  Anesthetic complications: no  Cardiovascular status: acceptable  Respiratory status: acceptable  Hydration status: acceptable  Post anesthesia nausea and vomiting:  none      Vitals Value Taken Time   /71 9/10/2019  3:00 PM   Temp 36.7 °C (98 °F) 9/10/2019  2:37 PM   Pulse 64 9/10/2019  3:00 PM   Resp 14 9/10/2019  3:00 PM   SpO2 92 % 9/10/2019  3:00 PM

## 2019-09-10 NOTE — DISCHARGE INSTRUCTIONS
Discharge Instructions/Follow-up Plans:   MD Instructions:     Follow-up with Wade Rihc, Physician Assistant for Dr. Marleny Carson in 1 week. Keep incisions clean and dry, may remain uncovered. Do not apply lotions, creams or ointments to incisions.     Diet - as tolerated - Soft foods diet. Activity - ambulate - as tolerated - no heavy lifting >10lb. May shower - no tub baths or soaking/submerging.     You may resume your Aspirin on 9/11/19. No driving while taking narcotics. Do not drink alcohol while taking narcotics. Resume other home medications.      If problems or questions arise, please call our office at [phone]. After general anesthesia or intravenous sedation, for 24 hours or while taking prescription Narcotics:  · Limit your activities  · A responsible adult needs to be with you for the next 24 hours  · Do not drive and operate hazardous machinery  · Do not make important personal or business decisions  · Do  not drink alcoholic beverages  · If you have not urinated within 8 hours after discharge, please contact your surgeon on call. · If you have sleep apnea and have a CPAP machine, please use it for all naps and sleeping. · Please use caution when taking narcotics and any of your home medications that may cause drowsiness. *  Please give a list of your current medications to your Primary Care Provider. *  Please update this list whenever your medications are discontinued, doses are      changed, or new medications (including over-the-counter products) are added. *  Please carry medication information at all times in case of emergency situations. These are general instructions for a healthy lifestyle:  No smoking/ No tobacco products/ Avoid exposure to second hand smoke  Surgeon General's Warning:  Quitting smoking now greatly reduces serious risk to your health.   Obesity, smoking, and sedentary lifestyle greatly increases your risk for illness  A healthy diet, regular physical exercise & weight monitoring are important for maintaining a healthy lifestyle    You may be retaining fluid if you have a history of heart failure or if you experience any of the following symptoms:  Weight gain of 3 pounds or more overnight or 5 pounds in a week, increased swelling in our hands or feet or shortness of breath while lying flat in bed.   Please call your doctor as soon as you notice any of these symptoms; do not wait until your next office visit.

## 2019-09-11 NOTE — OP NOTES
[facility]  OPERATIVE REPORT    Name:  Soha Garcia  MR#:  [id number]  :  1950  ACCOUNT #:  [de-identified]  DATE OF SERVICE:  09/10/2019      PREOPERATIVE DIAGNOSIS:  Recurrent left inguinal hernia. POSTOPERATIVE DIAGNOSIS:  Recurrent left direct inguinal hernia. PROCEDURE PERFORMED:  Recurrent left inguinal hernia repair with mesh. SURGEON:  Monica Jasso MD    ASSISTANT:  NAGA Ramos    ANESTHESIA:  General.    COMPLICATIONS:  None. COUNTS:  Correct. ESTIMATED BLOOD LOSS:  Minimal.    SPECIMENS REMOVED:  None. IMPLANTS:  Phasix large bioabsorbable plug and ProGrip patch overlay. PROCEDURE:  After the patient was asleep, the abdomen was prepped an draped in the sterile fashion. Time-out was carried out. All were in agreement. Standard left inguinal incision was utilized. External oblique aponeurosis was divided in the direction of its fibers. The patient had a very large direct inguinal hernia. This was cleansed from the surrounding cord structures and reduced. A large Phasix plug was then placed in the defect. This was closed and secured with interrupted 0 Vicryls. A ProGrip patch overlay was then placed around the cord and put in correct position. Interrupted 0 Vicryl was utilized to tack this in place as well. A #2 Prolene was utilized to close the external oblique aponeurosis. The ilioinguinal nerve was identified and spared throughout the procedure. 0.5% Marcaine with epinephrine was instilled into the skin. Interrupted 3-0 Vicryl was used to close the subcutaneous tissue and staples were used to close the skin. Sterile dressing applied. The patient tolerated the procedure well.     NAGA Coffman was scrubbed and present during the entire procedure and assisted in retraction secondary to the patient's body habitus which required this as well as performing closure and direct assistance and multiple directional retraction vito.       Bradly Mancia MD      [id number]  D:  09/10/2019 14:25  T:  09/10/2019 14:30  JOB #:  [id number]

## 2019-09-12 ENCOUNTER — HOSPITAL ENCOUNTER (EMERGENCY)
Age: 69
Discharge: HOME | End: 2019-09-12
Attending: EMERGENCY MEDICINE
Payer: MEDICARE

## 2019-09-12 VITALS
HEART RATE: 107 BPM | OXYGEN SATURATION: 92 % | BODY MASS INDEX: 27.77 KG/M2 | RESPIRATION RATE: 18 BRPM | TEMPERATURE: 98.7 F | SYSTOLIC BLOOD PRESSURE: 143 MMHG | HEIGHT: 72 IN | DIASTOLIC BLOOD PRESSURE: 72 MMHG | WEIGHT: 205 LBS

## 2019-09-12 DIAGNOSIS — R33.9 URINARY RETENTION: Primary | ICD-10-CM

## 2019-09-12 LAB
APPEARANCE UR: CLEAR
BILIRUB UR QL: NEGATIVE
COLOR UR: YELLOW
GLUCOSE UR STRIP.AUTO-MCNC: NEGATIVE MG/DL
HGB UR QL STRIP: NEGATIVE
KETONES UR QL STRIP.AUTO: NEGATIVE MG/DL
LEUKOCYTE ESTERASE UR QL STRIP.AUTO: NEGATIVE
NITRITE UR QL STRIP.AUTO: NEGATIVE
PH UR STRIP: 6.5 [PH] (ref 5–9)
PROT UR STRIP-MCNC: NEGATIVE MG/DL
SP GR UR REFRACTOMETRY: 1.01 (ref 1–1.02)
UROBILINOGEN UR QL STRIP.AUTO: 0.2 EU/DL (ref 0.2–1)

## 2019-09-12 PROCEDURE — 99283 EMERGENCY DEPT VISIT LOW MDM: CPT | Performed by: EMERGENCY MEDICINE

## 2019-09-12 PROCEDURE — 51702 INSERT TEMP BLADDER CATH: CPT | Performed by: EMERGENCY MEDICINE

## 2019-09-12 PROCEDURE — 81003 URINALYSIS AUTO W/O SCOPE: CPT

## 2019-09-12 PROCEDURE — 87086 URINE CULTURE/COLONY COUNT: CPT

## 2019-09-12 PROCEDURE — 77030005515 HC CATH URETH FOL14 BARD -B

## 2019-09-12 PROCEDURE — 77030012862 HC BG URIN LEG BARD -A

## 2019-09-12 NOTE — ED TRIAGE NOTES
Pt had hernia repair last week. States he is barely able to urinate. Only able to dribble. Has not been able to get steady stream out in a couple of days.

## 2019-09-12 NOTE — ED PROVIDER NOTES
23-year-old male with a history of BPH presenting for urinary retention. The history is provided by the patient. Urinary Retention    This is a recurrent problem. The current episode started 2 days ago. The problem occurs constantly. The problem has not changed since onset. The pain is associated with previous surgery. The pain is located in the suprapubic region. The quality of the pain is aching. The pain is at a severity of 4/10. The pain is moderate. Pertinent negatives include no anorexia, no fever, no belching, no diarrhea, no flatus, no hematochezia, no melena, no nausea, no vomiting, no constipation, no dysuria, no frequency, no hematuria, no headaches, no arthralgias, no myalgias, no trauma, no chest pain, no testicular pain and no back pain. Nothing worsens the pain. The pain is relieved by nothing. Past workup includes surgery. Past workup includes no CT scan, no ultrasound, no esophagogastroduodenoscopy, no UGI, no colonoscopy and no barium enema. Past medical history comments: BPH. reSent inguinal hernia repair       Past Medical History:   Diagnosis Date    Acute ulcer of stomach     resolved/ 1990s?  Anxiety and depression     Arthritis     BCC (basal cell carcinoma of skin)     BPH (benign prostatic hyperplasia)     CAD (coronary artery disease)     cardiac stents x 2-3- Pt unsure.  Placed in [id number] [place] disorder     Depression     Controlled with meds     Diabetes ([address]     type 2- oral agents -8/2019 A1C 7.2-  does not check bs at home, -identifies hypoglycemic symptoms but does not check BS    HLD (hyperlipidemia)     Controlled with meds     HTN (hypertension)     Controlled with meds     Melanoma ([address]     located on Head- surgical removal    MI (myocardial infarction) ([address] ~1999    Observed sleep apnea     wife states, pt has not had sleep study     Prediabetes     Controlled with diet     Smoker     1 ppd since age 22       Past Surgical History:   Procedure Laterality Date    CARDIAC SURG PROCEDURE UNLIST  1999    2 or 3 stents    HX CERVICAL FUSION  1990s    ACDF    HX HERNIA REPAIR Right 2018    inguinal    HX OTHER SURGICAL  1967    Skin grafts.     HX OTHER SURGICAL  2014    mohs         Family History:   Problem Relation Age of Onset    Heart Disease Other     Heart Attack Other     Hypertension Mother     Stroke Mother         several TIAs    Hypertension Father     Heart Attack Father     Heart Disease Father     Diabetes Father 80        type 2    Cancer Neg Hx        Social History     Socioeconomic History    Marital status:      Spouse name: Not on file    Number of children: Not on file    Years of education: Not on file    Highest education level: Not on file   Occupational History    Not on file   Social Needs    Financial resource strain: Not on file    Food insecurity:     Worry: Not on file     Inability: Not on file    Transportation needs:     Medical: Not on file     Non-medical: Not on file   Tobacco Use    Smoking status: Current Every Day Smoker     Packs/day: 1.00     Years: 36.00     Pack years: 36.00     Types: Cigarettes     Start date: 1971    Smokeless tobacco: Never Used   Substance and Sexual Activity    Alcohol use: No    Drug use: No    Sexual activity: Not on file   Lifestyle    Physical activity:     Days per week: Not on file     Minutes per session: Not on file    Stress: Not on file   Relationships    Social connections:     Talks on phone: Not on file     Gets together: Not on file     Attends Ten[employer] service: Not on file     Active member of club or organization: Not on file     Attends meetings of clubs or organizations: Not on file     Relationship status: Not on file    Intimate partner violence:     Fear of current or ex partner: Not on file     Emotionally abused: Not on file     Physically abused: Not on file     Forced sexual activity: Not on file   Other Topics Concern    Not on file   Social History Narrative    Not on file         ALLERGIES: Patient has no known allergies. Review of Systems   Constitutional: Negative for fever. Cardiovascular: Negative for chest pain. Gastrointestinal: Negative for anorexia, constipation, diarrhea, flatus, hematochezia, melena, nausea and vomiting. Genitourinary: Positive for difficulty urinating. Negative for dysuria, frequency, hematuria and testicular pain. Musculoskeletal: Negative for arthralgias, back pain and myalgias. Neurological: Negative for headaches. All other systems reviewed and are negative. Vitals:    09/12/19 1844   BP: 143/72   Pulse: (!) 107   Resp: 18   Temp: 98.7 °F (37.1 °C)   SpO2: 92%   Weight: 93 kg (205 lb)   Height: 6' (1.829 m)            Physical Exam   Constitutional: He is oriented to person, place, and time. He appears well-developed and well-nourished. HENT:   Head: Normocephalic and atraumatic. Eyes: Pupils are equal, round, and reactive to light. Conjunctivae and EOM are normal.   Neck: Normal range of motion. Neck supple. Cardiovascular: Normal rate, regular rhythm, normal heart sounds and intact distal pulses. Pulmonary/Chest: Effort normal and breath sounds normal.   Abdominal: Soft. Bowel sounds are normal.   Genitourinary:   Genitourinary Comments: Left inguinal incision sites clean, dry, intact does have a significant hematoma extending down to the base of the penis   Musculoskeletal: Normal range of motion. He exhibits no deformity. Neurological: He is alert and oriented to person, place, and time. No cranial nerve deficit. Skin: Skin is warm and dry. Psychiatric: He has a normal mood and affect. His behavior is normal.   Nursing note and vitals reviewed.        MDM  Number of Diagnoses or Management Options  Urinary retention:   Diagnosis management comments: 23-year-old male with a history of prostate issues presenting for acute root urinary retention status post left inguinal hernia repair. Hinton was placed with ease symptoms have resolved. The patient's urologist is Dr. Devyn Olea who happens to be on call today so we will discuss with him to get follow-up.        Amount and/or Complexity of Data Reviewed  Discuss the patient with other providers: yes (Discussed case with Dr. Jeremy Cuba who happens to be on call and is the patient's urologist who will schedule the patient for follow-up)    Risk of Complications, Morbidity, and/or Mortality  Presenting problems: moderate  Diagnostic procedures: moderate  Management options: moderate    Patient Progress  Patient progress: improved         Procedures

## 2019-09-13 NOTE — ED NOTES
I have reviewed discharge instructions with the patient. The patient verbalized understanding. Patient left ED via Discharge Method: ambulatory to Home with wife    Opportunity for questions and clarification provided. Patient given 0 scripts. To continue your aftercare when you leave the hospital, you may receive an automated call from our care team to check in on how you are doing. This is a free service and part of our promise to provide the best care and service to meet your aftercare needs.  If you have questions, or wish to unsubscribe from this service please call [phone]. Thank you for Choosing our [facility].

## 2019-09-15 LAB
BACTERIA SPEC CULT: NORMAL
SERVICE CMNT-IMP: NORMAL

## 2020-02-14 PROBLEM — K40.91 RECURRENT LEFT INGUINAL HERNIA: Status: RESOLVED | Noted: 2019-08-15 | Resolved: 2020-02-14

## 2020-02-14 PROBLEM — N18.30 STAGE 3 CHRONIC KIDNEY DISEASE (HCC): Status: ACTIVE | Noted: 2020-02-14

## 2020-07-16 NOTE — OP NOTES
Yousif Saldivar MD  [facility]  [place], [address], [place]  [phone]        Herniorrhaphy Procedure Note    Indications: This is a 78 yrs male who presents with a symptomatic left inguinal hernia. He was positive for a left  inguinal hernia. The patient was admitted for surgery as conservative measures have failed. Pre-operative Diagnosis: Hernia, inguinal, left [K40.90]    Post-operative Diagnosis: Hernia, inguinal, left [K40.90]    Surgeon: Yomi Mackay MD      Assistant:   Surgeon(s):  Yomi Mackay MD    Anesthesia:  General plus local    Procedure:   LAPAROSCOPIC Left INGUINAL HERNIORRAPHY   TEPP approach with properitoneal mesh placement. Procedure Details   He was taken to Operating Room and identfied as Ni Lozano and the procedure verified  A Time Out was held and the above information confirmed. The patient was placed on the OR table in the supine position and received preoperative antibiotic prophylaxis and subcutaneous heparin. General endotracheal anesthesia was initiated, the patient was instructed to void prior to the procedure and sequential compression stockings were placed. The abdomen down to the groin was prepped and draped in the standard fashion. A transverse incision was made inferior and to the left of the umbilicus overlying the rectus abdominus muscle on that side. The incision was carried down to the rectus sheath where the fascia was cleared of of its overlying tissue. The rectus fascia was incised in a transverse direction using the number 12 scalpel. Balloon dissection was used the develop the pre-peritoneal space by lifting the rectus abdominus muscle superior and laterally with an S retractor to accommodate the balloon obturator and dissector. The obturator was removed and the space inflated under direct camera vision.  The initial balloon placement was preperitoneally confirmed by the camera to access the appropriate space. The lubricated balloon was inflated for 2 to 3 minutes to tamponade any venous bleeding which may occur. The balloon was deflated and removed. The space and was insufflated to 10 mm of mercury using carbon dioxide gas. The two 5 mm trocars placed in the midline, one superior to the pubic bone and one between the pubic bone and the umbilicus under direct vision. Two blunt graspers were used to develop the pre-peritoneal space laterally. The left sided cord/cord vessels were dissected laterally with an indirect sac being  from the cord vessels and the spermatic cord. The indirect sac was reduced well below the lower level of the inferior edge of the mesh. The pubic bone was cleared off of its loose areolar tissue. A   piece of 4 x 6 cm polyproprolene mesh was cut in a keyhole fashion and placed into the pre-peritoneal space via the Warren trocar. The mesh was oriented in the proper position and tacked to estrella's ligament using the 5mm Absorbatack device. One was placed lateral on the crossing tails of the mesh where it could be palpated on the abdominal wall and a third lateral to the inferior epigastric artery and rectus muscle. The mesh was lying in good position with no evidence of bleeding noted. The opposite side was explored with the same dissection technique and did NOT require repair. Findings included left direct inguinal hernia and a previously repaired right. The procedure was deemed completed at which point the two 5 mm trocars were removed without evidence of bleeding from the trocar sites. The 12 mm  trocar was removed and the fascia of the umbilicus was closed with a 0 Vicryl suture in the anterior rectus sheath. All three trocar sites were closed with subcuticular sutures of 4-0 of Vicryl. A total of 30cc's 0.25% Marcaine was injected in divided doses to the three incision sites. Dermabond was placed over the wounds.  The patient was extubated and taken to Recovery Room in stable Condition. Findings: as above    Estimated Blood Loss:              Implants:   Implant Name Type Inv.  Item Serial No.  Lot No. LRB No. Used   MESH [facility]) FLAT SHT MARLX 3X6IN --  - [id number]   MESH DEVORA FLAT SHT MARLX 3X6IN --  [id number] [id number] Left 1              Signed By: Brooks Alas MD Female

## 2020-09-04 ENCOUNTER — HOSPITAL ENCOUNTER (EMERGENCY)
Age: 70
Discharge: HOME | End: 2020-09-04
Attending: EMERGENCY MEDICINE
Payer: MEDICARE

## 2020-09-04 VITALS
SYSTOLIC BLOOD PRESSURE: 106 MMHG | WEIGHT: 180 LBS | DIASTOLIC BLOOD PRESSURE: 57 MMHG | HEART RATE: 76 BPM | HEIGHT: 72 IN | RESPIRATION RATE: 16 BRPM | OXYGEN SATURATION: 97 % | BODY MASS INDEX: 24.38 KG/M2 | TEMPERATURE: 99.3 F

## 2020-09-04 DIAGNOSIS — N30.00 ACUTE CYSTITIS WITHOUT HEMATURIA: Primary | ICD-10-CM

## 2020-09-04 LAB
ALBUMIN SERPL-MCNC: 3.3 G/DL (ref 3.2–4.6)
ALBUMIN/GLOB SERPL: 1.1 {RATIO} (ref 1.2–3.5)
ALP SERPL-CCNC: 48 U/L (ref 50–136)
ALT SERPL-CCNC: 27 U/L (ref 12–65)
ANION GAP SERPL CALC-SCNC: 6 MMOL/L (ref 7–16)
APPEARANCE UR: CLEAR
AST SERPL-CCNC: 20 U/L (ref 15–37)
BACTERIA URNS QL MICRO: 0 /HPF
BASOPHILS # BLD: 0.1 K/UL (ref 0–0.2)
BASOPHILS NFR BLD: 0 % (ref 0–2)
BILIRUB SERPL-MCNC: 0.5 MG/DL (ref 0.2–1.1)
BILIRUB UR QL: NEGATIVE
BUN SERPL-MCNC: 32 MG/DL (ref 8–23)
CALCIUM SERPL-MCNC: 9.1 MG/DL (ref 8.3–10.4)
CASTS URNS QL MICRO: ABNORMAL /LPF
CHLORIDE SERPL-SCNC: 107 MMOL/L (ref 98–107)
CO2 SERPL-SCNC: 24 MMOL/L (ref 21–32)
COLOR UR: YELLOW
CREAT SERPL-MCNC: 1.59 MG/DL (ref 0.8–1.5)
DIFFERENTIAL METHOD BLD: ABNORMAL
EOSINOPHIL # BLD: 0 K/UL (ref 0–0.8)
EOSINOPHIL NFR BLD: 0 % (ref 0.5–7.8)
EPI CELLS #/AREA URNS HPF: ABNORMAL /HPF
ERYTHROCYTE [DISTWIDTH] IN BLOOD BY AUTOMATED COUNT: 13.7 % (ref 11.9–14.6)
GLOBULIN SER CALC-MCNC: 3.1 G/DL (ref 2.3–3.5)
GLUCOSE SERPL-MCNC: 144 MG/DL (ref 65–100)
GLUCOSE UR STRIP.AUTO-MCNC: NEGATIVE MG/DL
HCT VFR BLD AUTO: 37.5 % (ref 41.1–50.3)
HGB BLD-MCNC: 13.2 G/DL (ref 13.6–17.2)
HGB UR QL STRIP: ABNORMAL
IMM GRANULOCYTES # BLD AUTO: 0 K/UL (ref 0–0.5)
IMM GRANULOCYTES NFR BLD AUTO: 0 % (ref 0–5)
KETONES UR QL STRIP.AUTO: NEGATIVE MG/DL
LACTATE SERPL-SCNC: 0.9 MMOL/L (ref 0.4–2)
LEUKOCYTE ESTERASE UR QL STRIP.AUTO: ABNORMAL
LYMPHOCYTES # BLD: 0.8 K/UL (ref 0.5–4.6)
LYMPHOCYTES NFR BLD: 7 % (ref 13–44)
MCH RBC QN AUTO: 33.2 PG (ref 26.1–32.9)
MCHC RBC AUTO-ENTMCNC: 35.2 G/DL (ref 31.4–35)
MCV RBC AUTO: 94.2 FL (ref 79.6–97.8)
MONOCYTES # BLD: 0.9 K/UL (ref 0.1–1.3)
MONOCYTES NFR BLD: 7 % (ref 4–12)
NEUTS SEG # BLD: 9.8 K/UL (ref 1.7–8.2)
NEUTS SEG NFR BLD: 85 % (ref 43–78)
NITRITE UR QL STRIP.AUTO: NEGATIVE
NRBC # BLD: 0 K/UL (ref 0–0.2)
PH UR STRIP: 5.5 [PH] (ref 5–9)
PLATELET # BLD AUTO: 144 K/UL (ref 150–450)
PMV BLD AUTO: 10.2 FL (ref 9.4–12.3)
POTASSIUM SERPL-SCNC: 4.2 MMOL/L (ref 3.5–5.1)
PROCALCITONIN SERPL-MCNC: 0.16 NG/ML
PROT SERPL-MCNC: 6.4 G/DL (ref 6.3–8.2)
PROT UR STRIP-MCNC: 30 MG/DL
RBC # BLD AUTO: 3.98 M/UL (ref 4.23–5.6)
RBC #/AREA URNS HPF: ABNORMAL /HPF
SODIUM SERPL-SCNC: 137 MMOL/L (ref 136–145)
SP GR UR REFRACTOMETRY: 1.02 (ref 1–1.02)
UROBILINOGEN UR QL STRIP.AUTO: 1 EU/DL (ref 0.2–1)
WBC # BLD AUTO: 11.6 K/UL (ref 4.3–11.1)
WBC URNS QL MICRO: ABNORMAL /HPF

## 2020-09-04 PROCEDURE — 74011000258 HC RX REV CODE- 258: Performed by: EMERGENCY MEDICINE

## 2020-09-04 PROCEDURE — 96365 THER/PROPH/DIAG IV INF INIT: CPT

## 2020-09-04 PROCEDURE — 81001 URINALYSIS AUTO W/SCOPE: CPT

## 2020-09-04 PROCEDURE — 96361 HYDRATE IV INFUSION ADD-ON: CPT

## 2020-09-04 PROCEDURE — 80053 COMPREHEN METABOLIC PANEL: CPT

## 2020-09-04 PROCEDURE — 84145 PROCALCITONIN (PCT): CPT

## 2020-09-04 PROCEDURE — 85025 COMPLETE CBC W/AUTO DIFF WBC: CPT

## 2020-09-04 PROCEDURE — 99284 EMERGENCY DEPT VISIT MOD MDM: CPT

## 2020-09-04 PROCEDURE — 87040 BLOOD CULTURE FOR BACTERIA: CPT

## 2020-09-04 PROCEDURE — 93005 ELECTROCARDIOGRAM TRACING: CPT | Performed by: EMERGENCY MEDICINE

## 2020-09-04 PROCEDURE — 87086 URINE CULTURE/COLONY COUNT: CPT

## 2020-09-04 PROCEDURE — 83605 ASSAY OF LACTIC ACID: CPT

## 2020-09-04 PROCEDURE — 74011250637 HC RX REV CODE- 250/637: Performed by: EMERGENCY MEDICINE

## 2020-09-04 PROCEDURE — 74011250636 HC RX REV CODE- 250/636: Performed by: EMERGENCY MEDICINE

## 2020-09-04 RX ORDER — CIPROFLOXACIN 500 MG/1
500 TABLET ORAL 2 TIMES DAILY
Qty: 14 TAB | Refills: 0 | Status: SHIPPED | OUTPATIENT
Start: 2020-09-04 | End: 2020-09-11

## 2020-09-04 RX ORDER — ACETAMINOPHEN 500 MG
1000 TABLET ORAL
Status: COMPLETED | OUTPATIENT
Start: 2020-09-04 | End: 2020-09-04

## 2020-09-04 RX ADMIN — CEFTRIAXONE SODIUM 1 G: 1 INJECTION, POWDER, FOR SOLUTION INTRAMUSCULAR; INTRAVENOUS at 20:02

## 2020-09-04 RX ADMIN — ACETAMINOPHEN 1000 MG: 500 TABLET, FILM COATED ORAL at 18:34

## 2020-09-04 RX ADMIN — SODIUM CHLORIDE 1000 ML: 900 INJECTION, SOLUTION INTRAVENOUS at 18:34

## 2020-09-04 NOTE — ED PROVIDER NOTES
Patient presents the ER complaining of fevers and chills. Patient reports he started having fevers and chills 2 days ago. Status post recent cystoscope. States felt weak 2 days ago while playing golf. Went to the urgent care on yesterday. Was diagnosed with UTI, given Rocephin and sent out on Bactrim. States fevers and chills recurred today. Denies any vomiting. Denies any headache or cough. Denies any shortness of breath. The history is provided by the patient. Fever    This is a recurrent problem. The current episode started 2 days ago. The problem occurs constantly. The maximum temperature noted was 101 - 101.9 F. Associated symptoms include muscle aches. Pertinent negatives include no chest pain, no congestion, no headaches, no sore throat, no shortness of breath and no rash. He has tried antibiotics for the symptoms. Past Medical History:   Diagnosis Date    Acute ulcer of stomach     resolved/ 1990s?  Anxiety and depression     Arthritis     BCC (basal cell carcinoma of skin)     BPH (benign prostatic hyperplasia)     CAD (coronary artery disease)     cardiac stents x 2-3- Pt unsure.  [place]aced [place] [address] [address] disorder     Depression     Controlled with meds     Diabetes ([address]     type 2- oral agents -8/2019 A1C 7.2-  does not check bs at home, -identifies hypoglycemic symptoms but does not check BS    HLD (hyperlipidemia)     Controlled with meds     HTN (hypertension)     Controlled with meds     Melanoma ([address]     located on Head- surgical removal    MI (myocardial infarction) ([facility] ~1999    Observed sleep apnea     wife states, pt has not had sleep study     Prediabetes     Controlled with diet     Smoker     1 ppd since age 22       Past Surgical History:   Procedure Laterality Date    CARDIAC SURG PROCEDURE UNLIST  1999    2 or 3 stents    HX CERVICAL FUSION  1990s    ACDF    HX HERNIA REPAIR Right 2018    inguinal    HX HERNIA REPAIR  09/12/2019    HX OTHER SURGICAL  1967    Skin grafts.  HX OTHER SURGICAL  2014    mohs         Family History:   Problem Relation Age of Onset    Heart Disease Other     Heart Attack Other     Hypertension Mother     Stroke Mother         several TIAs    Hypertension Father     Heart Attack Father     Heart Disease Father     Diabetes Father 80        type 2    Cancer Neg Hx        Social History     Socioeconomic History    Marital status:      Spouse name: Not on file    Number of children: Not on file    Years of education: Not on file    Highest education level: Not on file   Occupational History    Not on file   Social Needs    Financial resource strain: Not on file    Food insecurity     Worry: Not on file     Inability: Not on file    Transportation needs     Medical: Not on file     Non-medical: Not on file   Tobacco Use    Smoking status: Current Every Day Smoker     Packs/day: 1.00     Years: 36.00     Pack years: 36.00     Types: Cigarettes     Start date: 1971    Smokeless tobacco: Never Used   Substance and Sexual Activity    Alcohol use: No    Drug use: No    Sexual activity: Not on file   Lifestyle    Physical activity     Days per week: Not on file     Minutes per session: Not on file    Stress: Not on file   Relationships    Social connections     Talks on phone: Not on file     Gets together: Not on file     Attends Confucianism service: Not on file     Active member of club or organization: Not on file     Attends meetings of clubs or organizations: Not on file     Relationship status: Not on file    Intimate partner violence     Fear of current or ex partner: Not on file     Emotionally abused: Not on file     Physically abused: Not on file     Forced sexual activity: Not on file   Other Topics Concern    Not on file   Social History Narrative    Not on file         ALLERGIES: Patient has no known allergies. Review of Systems   Constitutional: Positive for chills and fever.    HENT: Negative for congestion, dental problem and sore throat. Eyes: Negative for redness. Respiratory: Negative for chest tightness and shortness of breath. Cardiovascular: Negative for chest pain. Gastrointestinal: Negative for abdominal pain and anal bleeding. Genitourinary: Positive for frequency and urgency. Negative for discharge and hematuria. Musculoskeletal: Negative for back pain and gait problem. Skin: Negative for rash. Neurological: Negative for tremors, weakness and headaches. Hematological: Negative for adenopathy. Does not bruise/bleed easily. Psychiatric/Behavioral: Negative for behavioral problems and confusion. All other systems reviewed and are negative. Vitals:    09/04/20 1801   BP: 120/60   Pulse: (!) 108   Resp: 16   Temp: (!) 102.5 °F (39.2 °C)   SpO2: 95%   Weight: 81.6 kg (180 lb)   Height: 6' (1.829 m)            Physical Exam  Vitals signs and nursing note reviewed. Constitutional:       Appearance: Normal appearance. HENT:      Head: Normocephalic and atraumatic. Nose: Nose normal.   Eyes:      Extraocular Movements: Extraocular movements intact. Conjunctiva/sclera: Conjunctivae normal.      Pupils: Pupils are equal, round, and reactive to light. Neck:      Musculoskeletal: Normal range of motion and neck supple. Cardiovascular:      Rate and Rhythm: Normal rate and regular rhythm. Pulses: Normal pulses. Heart sounds: Normal heart sounds. Pulmonary:      Effort: Pulmonary effort is normal.      Breath sounds: Normal breath sounds. Abdominal:      General: Abdomen is flat. Musculoskeletal: Normal range of motion. General: No swelling or tenderness. Skin:     General: Skin is warm. Neurological:      Mental Status: He is alert.           MDM  Number of Diagnoses or Management Options  Acute cystitis without hematuria:   Diagnosis management comments: Given symptoms some concern for sepsis or bacteremia  Vital signs stable here.  No vomiting or flank pain    7:47 PM  White count is stable at 11. Urinalysis shows 20-50 white blood cells. Normal lactic acid. Patient feels better here after being treated with IV fluids and Tylenol. Will be given a dose of Rocephin here IV. He states he feels better and wants to go home. I feel this is reasonable however will discontinue his Bactrim given his history of kidney issues and start on ciprofloxacin instead       Amount and/or Complexity of Data Reviewed  Clinical lab tests: ordered and reviewed  Review and summarize past medical records: yes (History  Chief Complaint   Patient presents with    Urinary Frequency   pt had a cystoscope 1 week ago. now with frequent urination and a fever. took Tylenol at 1630. HPI  Patient is a 81-year-old gentleman who sees urology. He is noted to have BPH with obstruction. Patient states he had a cystoscope 1 week ago. He is scheduled for surgery for his BPH the end of this month. He has problems with urination. He states he urinates every few hours. He stated today he [place] it was very hot. He is was sweating a lot. I decreased fluids. He felt poorly when he got off the golf course. He is has a fever of 99.8 here. He denies flank pain. Mild suprapubic tenderness. He denies chills. He did take a Tylenol prior to coming. He is scheduled for COVID testing for surgery not that he has symptoms. He denies nausea, vomiting, diarrhea. He has no headache. No cough or sore throat. He has a history of recurrent urinary tract infections. He is alert in the room. He states he is actually feeling better since being here.   Past Medical History:   Diagnosis Date    Cancer ([address]    High cholesterol    Hypertension    Myocardial infarction [facility])     Past Surgical History:   Procedure Laterality Date    BACK SURGERY    HERNIA REPAIR    SKIN GRAFT     Family History   Problem Relation Age of Onset    Diabetes Neg Hx    Stroke Neg Hx    Heart disease Neg Hx    Cancer Neg Hx     Social History     Tobacco Use    Smoking status: Current Every Day Smoker   Packs/day: 0.50   Years: 45.00   Pack years: 22.50    Smokeless tobacco: Never Used   Substance Use Topics    Alcohol use: No    Drug use: No     Review of Systems   Constitutional: Positive for fever. Negative for activity change, appetite change, chills, diaphoresis and fatigue. HENT: Negative. Negative for congestion, ear discharge, ear pain, postnasal drip, rhinorrhea, sinus pressure, sore throat, trouble swallowing and voice change. Eyes: Negative. Negative for photophobia, pain, discharge, redness, itching and visual disturbance. Respiratory: Negative. Negative for cough, choking, chest tightness, shortness of breath, wheezing and stridor. Cardiovascular: Negative. Negative for chest pain, palpitations and leg swelling. Gastrointestinal: Positive for abdominal pain. Negative for abdominal distention, anal bleeding, constipation, diarrhea, nausea and vomiting. Mild suprapubic tenderness   Genitourinary: Positive for difficulty urinating, dysuria and frequency. Negative for decreased urine volume, discharge, flank pain and hematuria. Musculoskeletal: Negative. Negative for back pain, gait problem, myalgias, neck pain and neck stiffness. Skin: Negative. Negative for color change, pallor and rash. Allergic/Immunologic: Negative. Negative for environmental allergies, food allergies and immunocompromised state. Neurological: Negative. Negative for dizziness, tremors, syncope, weakness, light-headedness, numbness and headaches. Hematological: Negative. Negative for adenopathy. Does not bruise/bleed easily. Psychiatric/Behavioral: Negative. Negative for confusion. Physical Exam  /68  Pulse (!) 101  Temp 99.8 °F (37.7 °C) (Oral)  Resp 16  SpO2 96%     Physical Exam   Constitutional: He is oriented to person, place, and time.  He appears well-developed and well-nourished. No distress. HENT:   Head: Normocephalic and atraumatic. Right Ear: External ear normal.   Left Ear: External ear normal.   Nose: Nose normal.   Mouth/Throat: Oropharynx is clear and moist. No oropharyngeal exudate. Eyes: Pupils are equal, round, and reactive to light. Conjunctivae are normal. Right eye exhibits no discharge. Left eye exhibits no discharge. No scleral icterus. Neck: Normal range of motion. Neck supple. Cardiovascular: Normal rate, regular rhythm, normal heart sounds and intact distal pulses. Exam reveals no gallop. No murmur heard. Pulmonary/Chest: Effort normal and breath sounds normal. No stridor. No respiratory distress. He has no wheezes. He has no rales. He exhibits no tenderness. Abdominal: Soft. Bowel sounds are normal. There is abdominal tenderness. Suprapubic area minimal, no flank tenderness   Lymphadenopathy:   He has no cervical adenopathy. Neurological: He is alert and oriented to person, place, and time. He has normal reflexes. Skin: Skin is warm and dry. No rash noted. He is not diaphoretic. No erythema. No pallor. Psychiatric: He has a normal mood and affect. Musculoskeletal: Normal range of motion. General: No tenderness or edema. Comments: No CVA tenderness     Care Course  Procedures    MDM  Final diagnoses:   Dysuria   Fever, unspecified fever cause   Acute cystitis without hematuria   Leukocytosis, unspecified type   Prostatic hypertrophy     Medications   cefTRIAXone (ROCEPHIN) injection 1 g (has no administration in time range)   sodium chloride bolus (NS) 0.9 % 1,000 mL (0 mL Intravenous Stopped 9/3/20 1932)     Patient does not want to go to the hospital. He looks great. He feels fine. He states if he keeps running a fever, gets muscle aches, worsening pain or dysuria he will go immediately to the emergency room. I asked him to see his urologist or primary care physician tomorrow. He needs follow-up.  If he cannot get in with them he can come back here at  to be seen and reevaluated. Again if he is worse he is to go straight to the emergency room. Patient states he will drink plenty of fluids and watch himself carefully and follow his fever. If worse he will go the ER or contact his urologist. In chart review he had a urine sensitive to Bactrim and Rocephin. It was resistant to Keflex please see chart for details. Patient feels fine. He has no complaints. He is not running a fever. No flank pain. Will allow him to go home. Culture of urine is pending.   Current Discharge Medication List     START taking these medications   Details   sulfamethoxazole-trimethoprim (BACTRIM DS) 800-160 mg per tablet Take 1 tablet (160 mg of trimethoprim) by mouth 2 (two) times a day for 10 days smx-tmp DS (BACTRIM) 800-160 mg tabs (1tab q12 D10)  Qty: 20 tablet, Refills: 0   Associated Diagnoses: Acute cystitis without hematuria; Prostatic hypertrophy         Results  Results for orders placed or performed during the hospital encounter of 09/03/20   Urinalysis, Auto with Microscopic-POC   Result Value Ref Range   Color, UA Yellow   Clarity, UA Clear Clear   Specific Gravity, UA 1.020 1.003 - 1.040   pH, UA 6.0 4.6 - 8.0   Leukocyte Esterase, UA Small (A) Negative   Nitrite, UA Negative Negative   Protein, UA 30 (A) Negative mg/dL   Glucose, UA Negative Negative mg/dl   Ketones, UA Negative Negative mg/dL   Urobilinogen, UA 1.0 <2.0 eu/dl   Bilirubin, UA Negative Negative mg/dL   Blood, UA Negative Negative   Urine Microscopic - POC   Result Value Ref Range   WBC, UA 50-60 (A) 0 , <1, 1-3, None Seen /HPF   RBC, UA 3-5 (A) 0 , 1 , 1-2, None Seen, <1 /HPF   Squamous Epithelial, UA 1-5 <1, 1-5, 0 , None Seen /HPF   Hyaline Casts, UA None Seen None Seen, <1, 1-5, Occasional, 0 /LPF   Bacteria, UA 5+ (A) None Seen /HPF   BMET with Ionized Calcium-POC   Result Value Ref Range   Sodium,  133 - 144 mMol/L   Potassium, POC 4.3 3.4 - 5.1 mMol/L   Chloride, POC 102 101 - 111 mMol/L   Anion Gap, POC 17 10 - 20 mmol/L   BUN, POC 28 (H) 9 - 20 mg/dL   Glucose,  (H) 82 - 99 mg/dL   Creatinine, POC 1.70 (H) 0.90 - 1.20 mg/dL   Calcium, Ionized 5.2 4.5 - 5.3 mg/dl   eGFR Non-African American 40 (L) >59 mL/min   eGFR  47 (L) >59 mL/min   CO2, POC 23.0 18.0 - 28.0 mmol/L   CBC with Auto Differential-POC   Result Value Ref Range   WBC 11.7 (H) 4.0 - 11.5 TH/mm3   RBC 4.15 (L) 4.50 - 5.90 Mil/mm3   Hemoglobin 13.6 13.5 - 17.5 g/dL   Hematocrit 39.4 (L) 41.0 - 53.0 %   MCV 95.0 80.0 - 100.0 fL   MCH 32.8 26.0 - 34.0 pg   MCHC 34.6 31.0 - 37.0 g/dL   RDW 14.1 12.0 - 16.0 %   Platelets 314 520 - 880 Th/mm3   MPV 6.5 (L) 6.9 - 10.6 fL   Granulocytes, % 83.2 %   Lymphocytes, % 9.9 %   Monocytes % 6.9 %   Lymphocytes, Abs 1.2 1.0 - 3.5 TH/mm3   Monocytes Abs 0.8 <1.0 TH/mm3   Granulocytes Absolute Count 9.7 (H) 1.4 - 6.6 TH/mm3    )    Risk of Complications, Morbidity, and/or Mortality  Presenting problems: high  Diagnostic procedures: moderate  Management options: high    Patient Progress  Patient progress: stable         Procedures                   Results Include:    Recent Results (from the past 24 hour(s))   CBC WITH AUTOMATED DIFF    Collection Time: 09/04/20  6:06 PM   Result Value Ref Range    WBC 11.6 (H) 4.3 - 11.1 K/uL    RBC 3.98 (L) 4.23 - 5.6 M/uL    HGB 13.2 (L) 13.6 - 17.2 g/dL    HCT 37.5 (L) 41.1 - 50.3 %    MCV 94.2 79.6 - 97.8 FL    MCH 33.2 (H) 26.1 - 32.9 PG    MCHC 35.2 (H) 31.4 - 35.0 g/dL    RDW 13.7 11.9 - 14.6 %    PLATELET 524 (L) 610 - 450 K/uL    MPV 10.2 9.4 - 12.3 FL    ABSOLUTE NRBC 0.00 0.0 - 0.2 K/uL    DF AUTOMATED      NEUTROPHILS 85 (H) 43 - 78 %    LYMPHOCYTES 7 (L) 13 - 44 %    MONOCYTES 7 4.0 - 12.0 %    EOSINOPHILS 0 (L) 0.5 - 7.8 %    BASOPHILS 0 0.0 - 2.0 %    IMMATURE GRANULOCYTES 0 0.0 - 5.0 %    ABS. NEUTROPHILS 9.8 (H) 1.7 - 8.2 K/UL    ABS. LYMPHOCYTES 0.8 0.5 - 4.6 K/UL    ABS. MONOCYTES 0.9 0.1 - 1.3 K/UL    ABS. EOSINOPHILS 0.0 0.0 - 0.8 K/UL    ABS. BASOPHILS 0.1 0.0 - 0.2 K/UL    ABS. IMM. GRANS. 0.0 0.0 - 0.5 K/UL   METABOLIC PANEL, COMPREHENSIVE    Collection Time: 09/04/20  6:06 PM   Result Value Ref Range    Sodium 137 136 - 145 mmol/L    Potassium 4.2 3.5 - 5.1 mmol/L    Chloride 107 98 - 107 mmol/L    CO2 24 21 - 32 mmol/L    Anion gap 6 (L) 7 - 16 mmol/L    Glucose 144 (H) 65 - 100 mg/dL    BUN 32 (H) 8 - 23 MG/DL    Creatinine 1.59 (H) 0.8 - 1.5 MG/DL    GFR est AA 56 (L) >60 ml/min/1.73m2    GFR est non-AA 46 (L) >60 ml/min/1.73m2    Calcium 9.1 8.3 - 10.4 MG/DL    Bilirubin, total 0.5 0.2 - 1.1 MG/DL    ALT (SGPT) 27 12 - 65 U/L    AST (SGOT) 20 15 - 37 U/L    Alk. phosphatase 48 (L) 50 - 136 U/L    Protein, total 6.4 6.3 - 8.2 g/dL    Albumin 3.3 3.2 - 4.6 g/dL    Globulin 3.1 2.3 - 3.5 g/dL    A-G Ratio 1.1 (L) 1.2 - 3.5     LACTIC ACID    Collection Time: 09/04/20  6:06 PM   Result Value Ref Range    Lactic acid 0.9 0.4 - 2.0 MMOL/L   URINALYSIS W/ RFLX MICROSCOPIC    Collection Time: 09/04/20  7:11 PM   Result Value Ref Range    Color YELLOW      Appearance CLEAR      Specific gravity 1.020 1.001 - 1.023      pH (UA) 5.5 5.0 - 9.0      Protein 30 (A) NEG mg/dL    Glucose Negative mg/dL    Ketone Negative NEG mg/dL    Bilirubin Negative NEG      Blood TRACE (A) NEG      Urobilinogen 1.0 0.2 - 1.0 EU/dL    Nitrites Negative NEG      Leukocyte Esterase SMALL (A) NEG      WBC 20-50 0 /hpf    RBC 0-3 0 /hpf    Epithelial cells 0-3 0 /hpf    Bacteria 0 0 /hpf    Casts 0-3 0 /lpf     Voice dictation software was used during the making of this note. This software is not perfect and grammatical and other typographical errors may be present. This note has been proofread, but may still contain errors.   Darshana Caldera MD; 9/4/2020 @7:47 PM   ===================================================================

## 2020-09-04 NOTE — ED TRIAGE NOTES
Pt arrives ambulatory to triage. Pt reports temp of 104 at home today sublingual. Pt states he was at [address] [facility] last night and diagnosed with UTI. States he got IV antibiotics there yesterday and took one dose this morning. Pt states he is feeling \"out of it\" and has been sleeping all day. Reports lethargy. Pt received IV rocephin at  and bactrim for abx at home. Pt reports taking ibuprofen 1 hr ago.

## 2020-09-04 NOTE — DISCHARGE INSTRUCTIONS
Discontinue taking Bactrim  Take new antibiotics as prescribed  Follow-up with your primary care physician  Follow-up with your urologist as well  Return to the ER for any new, worsening or life-threatening symptoms      Urinary Tract Infections in Men: Care Instructions  Your Care Instructions     A urinary tract infection, or UTI, is a general term for an infection anywhere between the kidneys and the tip of the penis. UTIs can also be a result of a prostate problem. Most cause pain or burning when you urinate. Most UTIs are caused by bacteria and can be cured with antibiotics. It is important to complete your treatment so that the infection does not get worse. Follow-up care is a key part of your treatment and safety. Be sure to make and go to all appointments, and call your doctor if you are having problems. It's also a good idea to know your test results and keep a list of the medicines you take. How can you care for yourself at home? · Take your antibiotics as prescribed. Do not stop taking them just because you feel better. You need to take the full course of antibiotics. · Take your medicines exactly as prescribed. Your doctor may have prescribed a medicine, such as phenazopyridine (Pyridium), to help relieve pain when you urinate. This turns your urine orange. You may stop taking it when your symptoms get better. But be sure to take all of your antibiotics, which treat the infection. · Drink extra water for the next day or two. This will help make the urine less concentrated and help wash out the bacteria causing the infection. (If you have kidney, heart, or liver disease and have to limit your fluids, talk with your doctor before you increase your fluid intake.)  · Avoid drinks that are carbonated or have caffeine. They can irritate the bladder. · Urinate often. Try to empty your bladder each time.   · To relieve pain, take a hot bath or lay a heating pad (set on low) over your lower belly or genital area. Never go to sleep with a heating pad in place. To help prevent UTIs  · Drink plenty of fluids, enough so that your urine is light yellow or clear like water. If you have kidney, heart, or liver disease and have to limit fluids, talk with your doctor before you increase the amount of fluids you drink. · Urinate when you have the urge. Do not hold your urine for a long time. Urinate before you go to sleep. · Keep your penis clean. Catheter care  If you have a drainage tube (catheter) in place, the following steps will help you care for it. · Always wash your hands before and after touching your catheter. · Check the area around the urethra for inflammation or signs of infection. Signs of infection include irritated, swollen, red, or tender skin, or pus around the catheter. · Clean the area around the catheter with soap and water two times a day. Dry with a clean towel afterward. · Do not apply powder or lotion to the skin around the catheter. To empty the urine collection bag   · Wash your hands with soap and water. · Without touching the drain spout, remove the spout from its sleeve at the bottom of the collection bag. Open the valve on the spout. · Let the urine flow out of the bag and into the toilet or a container. Do not let the tubing or drain spout touch anything. · After you empty the bag, clean the end of the drain spout with tissue and water. Close the valve and put the drain spout back into its sleeve at the bottom of the collection bag. · Wash your hands with soap and water. When should you call for help? Call your doctor now or seek immediate medical care if:    · Symptoms such as a fever, chills, nausea, or vomiting get worse or happen for the first time.     · You have new pain in your back just below your rib cage. This is called flank pain.     · There is new blood or pus in your urine.     · You are not able to take or keep down your antibiotics.    Watch closely for changes in your health, and be sure to contact your doctor if:    · You are not getting better after taking an antibiotic for 2 days.     · Your symptoms go away but then come back. Where can you learn more? Go to http://www.gray.com/  Enter N546 in the search box to learn more about \"Urinary Tract Infections in Men: Care Instructions. \"  Current as of: June 29, 2020               Content Version: 12.6  © 2006-2020 [employer]. Care instructions adapted under license by [employer] (which disclaims liability or warranty for this information). If you have questions about a medical condition or this instruction, always ask your healthcare professional. [address] any warranty or liability for your use of this information.

## 2020-09-05 LAB
ATRIAL RATE: 97 BPM
CALCULATED P AXIS, ECG09: 61 DEGREES
CALCULATED R AXIS, ECG10: 87 DEGREES
CALCULATED T AXIS, ECG11: 29 DEGREES
DIAGNOSIS, 93000: NORMAL
P-R INTERVAL, ECG05: 130 MS
Q-T INTERVAL, ECG07: 318 MS
QRS DURATION, ECG06: 88 MS
QTC CALCULATION (BEZET), ECG08: 403 MS
VENTRICULAR RATE, ECG03: 97 BPM

## 2020-09-07 LAB
BACTERIA SPEC CULT: NORMAL
SERVICE CMNT-IMP: NORMAL

## 2020-09-10 LAB
BACTERIA SPEC CULT: NORMAL
BACTERIA SPEC CULT: NORMAL
SERVICE CMNT-IMP: NORMAL
SERVICE CMNT-IMP: NORMAL

## 2020-09-18 ENCOUNTER — HOSPITAL ENCOUNTER (OUTPATIENT)
Dept: LAB | Age: 70
Discharge: HOME | End: 2020-09-18
Payer: MEDICARE

## 2020-09-18 LAB
APPEARANCE UR: CLEAR
BACTERIA URNS QL MICRO: 0 /HPF
BILIRUB UR QL: NEGATIVE
CASTS URNS QL MICRO: 0 /LPF
COLOR UR: YELLOW
EPI CELLS #/AREA URNS HPF: 0 /HPF
GLUCOSE UR STRIP.AUTO-MCNC: NEGATIVE MG/DL
HGB UR QL STRIP: NEGATIVE
KETONES UR QL STRIP.AUTO: NEGATIVE MG/DL
LEUKOCYTE ESTERASE UR QL STRIP.AUTO: ABNORMAL
NITRITE UR QL STRIP.AUTO: NEGATIVE
PH UR STRIP: 6.5 [PH] (ref 5–9)
PROT UR STRIP-MCNC: NEGATIVE MG/DL
RBC #/AREA URNS HPF: 0 /HPF
SP GR UR REFRACTOMETRY: 1.01 (ref 1–1.02)
UROBILINOGEN UR QL STRIP.AUTO: 0.2 EU/DL (ref 0.2–1)
WBC URNS QL MICRO: ABNORMAL /HPF

## 2020-09-18 PROCEDURE — 81001 URINALYSIS AUTO W/SCOPE: CPT

## 2020-09-28 ENCOUNTER — ANESTHESIA EVENT (OUTPATIENT)
Dept: SURGERY | Age: 70
End: 2020-09-28
Payer: MEDICARE

## 2020-09-29 ENCOUNTER — ANESTHESIA (OUTPATIENT)
Dept: SURGERY | Age: 70
End: 2020-09-29
Payer: MEDICARE

## 2020-09-29 ENCOUNTER — HOSPITAL ENCOUNTER (OUTPATIENT)
Age: 70
Setting detail: OUTPATIENT SURGERY
Discharge: HOME | End: 2020-09-29
Attending: UROLOGY | Admitting: UROLOGY
Payer: MEDICARE

## 2020-09-29 VITALS
HEART RATE: 71 BPM | WEIGHT: 188.8 LBS | DIASTOLIC BLOOD PRESSURE: 58 MMHG | TEMPERATURE: 97.5 F | OXYGEN SATURATION: 95 % | RESPIRATION RATE: 15 BRPM | HEIGHT: 72 IN | SYSTOLIC BLOOD PRESSURE: 137 MMHG | BODY MASS INDEX: 25.57 KG/M2

## 2020-09-29 DIAGNOSIS — N40.0 BENIGN NON-NODULAR PROSTATIC HYPERPLASIA WITHOUT LOWER URINARY TRACT SYMPTOMS: Primary | ICD-10-CM

## 2020-09-29 LAB — GLUCOSE BLD STRIP.AUTO-MCNC: 135 MG/DL (ref 65–100)

## 2020-09-29 PROCEDURE — 77030010509 HC AIRWY LMA MSK TELE -A: Performed by: ANESTHESIOLOGY

## 2020-09-29 PROCEDURE — 76060000033 HC ANESTHESIA 1 TO 1.5 HR: Performed by: UROLOGY

## 2020-09-29 PROCEDURE — 76210000020 HC REC RM PH II FIRST 0.5 HR: Performed by: UROLOGY

## 2020-09-29 PROCEDURE — 74011250636 HC RX REV CODE- 250/636: Performed by: ANESTHESIOLOGY

## 2020-09-29 PROCEDURE — 76010000138 HC OR TIME 0.5 TO 1 HR: Performed by: UROLOGY

## 2020-09-29 PROCEDURE — 82962 GLUCOSE BLOOD TEST: CPT

## 2020-09-29 PROCEDURE — 74011000250 HC RX REV CODE- 250: Performed by: UROLOGY

## 2020-09-29 PROCEDURE — 77030040361 HC SLV COMPR DVT MDII -B: Performed by: UROLOGY

## 2020-09-29 PROCEDURE — L8699 PROSTHETIC IMPLANT NOS: HCPCS | Performed by: UROLOGY

## 2020-09-29 PROCEDURE — 2709999900 HC NON-CHARGEABLE SUPPLY: Performed by: UROLOGY

## 2020-09-29 PROCEDURE — 77030019908 HC STETH ESOPH SIMS -A: Performed by: ANESTHESIOLOGY

## 2020-09-29 PROCEDURE — 77030019927 HC TBNG IRR CYSTO BAXT -A: Performed by: UROLOGY

## 2020-09-29 PROCEDURE — 74011250637 HC RX REV CODE- 250/637: Performed by: ANESTHESIOLOGY

## 2020-09-29 PROCEDURE — 74011000250 HC RX REV CODE- 250: Performed by: NURSE ANESTHETIST, CERTIFIED REGISTERED

## 2020-09-29 PROCEDURE — 77030040922 HC BLNKT HYPOTHRM STRY -A: Performed by: ANESTHESIOLOGY

## 2020-09-29 PROCEDURE — 74011250636 HC RX REV CODE- 250/636: Performed by: NURSE ANESTHETIST, CERTIFIED REGISTERED

## 2020-09-29 PROCEDURE — 74011250636 HC RX REV CODE- 250/636: Performed by: UROLOGY

## 2020-09-29 DEVICE — Z DUP USE 2308351IMPLANT UROLOGICAL UROLIFT: Type: IMPLANTABLE DEVICE | Site: PROSTATE | Status: FUNCTIONAL

## 2020-09-29 RX ORDER — SODIUM CHLORIDE 9 MG/ML
25 INJECTION, SOLUTION INTRAVENOUS CONTINUOUS
Status: DISCONTINUED | OUTPATIENT
Start: 2020-09-29 | End: 2020-09-29 | Stop reason: HOSPADM

## 2020-09-29 RX ORDER — SODIUM CHLORIDE 0.9 % (FLUSH) 0.9 %
5-40 SYRINGE (ML) INJECTION AS NEEDED
Status: DISCONTINUED | OUTPATIENT
Start: 2020-09-29 | End: 2020-09-29 | Stop reason: HOSPADM

## 2020-09-29 RX ORDER — FAMOTIDINE 20 MG/1
20 TABLET, FILM COATED ORAL ONCE
Status: COMPLETED | OUTPATIENT
Start: 2020-09-29 | End: 2020-09-29

## 2020-09-29 RX ORDER — DEXAMETHASONE SODIUM PHOSPHATE 4 MG/ML
INJECTION, SOLUTION INTRA-ARTICULAR; INTRALESIONAL; INTRAMUSCULAR; INTRAVENOUS; SOFT TISSUE AS NEEDED
Status: DISCONTINUED | OUTPATIENT
Start: 2020-09-29 | End: 2020-09-29 | Stop reason: HOSPADM

## 2020-09-29 RX ORDER — SODIUM CHLORIDE 0.9 % (FLUSH) 0.9 %
5-40 SYRINGE (ML) INJECTION EVERY 8 HOURS
Status: DISCONTINUED | OUTPATIENT
Start: 2020-09-29 | End: 2020-09-29 | Stop reason: HOSPADM

## 2020-09-29 RX ORDER — LIDOCAINE HYDROCHLORIDE 20 MG/ML
INJECTION, SOLUTION EPIDURAL; INFILTRATION; INTRACAUDAL; PERINEURAL AS NEEDED
Status: DISCONTINUED | OUTPATIENT
Start: 2020-09-29 | End: 2020-09-29 | Stop reason: HOSPADM

## 2020-09-29 RX ORDER — FENTANYL CITRATE 50 UG/ML
INJECTION, SOLUTION INTRAMUSCULAR; INTRAVENOUS AS NEEDED
Status: DISCONTINUED | OUTPATIENT
Start: 2020-09-29 | End: 2020-09-29 | Stop reason: HOSPADM

## 2020-09-29 RX ORDER — HYDROCODONE BITARTRATE AND ACETAMINOPHEN 5; 325 MG/1; MG/1
1 TABLET ORAL
Qty: 20 TAB | Refills: 0 | Status: SHIPPED | OUTPATIENT
Start: 2020-09-29 | End: 2020-10-06

## 2020-09-29 RX ORDER — SODIUM CHLORIDE, SODIUM LACTATE, POTASSIUM CHLORIDE, CALCIUM CHLORIDE 600; 310; 30; 20 MG/100ML; MG/100ML; MG/100ML; MG/100ML
100 INJECTION, SOLUTION INTRAVENOUS CONTINUOUS
Status: DISCONTINUED | OUTPATIENT
Start: 2020-09-29 | End: 2020-09-29 | Stop reason: HOSPADM

## 2020-09-29 RX ORDER — HYDROCODONE BITARTRATE AND ACETAMINOPHEN 7.5; 325 MG/1; MG/1
1 TABLET ORAL AS NEEDED
Status: DISCONTINUED | OUTPATIENT
Start: 2020-09-29 | End: 2020-09-29 | Stop reason: HOSPADM

## 2020-09-29 RX ORDER — HYOSCYAMINE SULFATE 0.12 MG/1
0.12 TABLET SUBLINGUAL
Qty: 30 TAB | Refills: 1 | Status: SHIPPED | OUTPATIENT
Start: 2020-09-29 | End: 2020-10-16 | Stop reason: ALTCHOICE

## 2020-09-29 RX ORDER — SULFAMETHOXAZOLE AND TRIMETHOPRIM 800; 160 MG/1; MG/1
1 TABLET ORAL 2 TIMES DAILY
Qty: 6 TAB | Refills: 0 | Status: SHIPPED | OUTPATIENT
Start: 2020-09-29 | End: 2020-10-02

## 2020-09-29 RX ORDER — CEFAZOLIN SODIUM/WATER 2 G/20 ML
2 SYRINGE (ML) INTRAVENOUS
Status: COMPLETED | OUTPATIENT
Start: 2020-09-29 | End: 2020-09-29

## 2020-09-29 RX ORDER — OXYCODONE HYDROCHLORIDE 5 MG/1
5 TABLET ORAL
Status: DISCONTINUED | OUTPATIENT
Start: 2020-09-29 | End: 2020-09-29 | Stop reason: HOSPADM

## 2020-09-29 RX ORDER — FENTANYL CITRATE 50 UG/ML
100 INJECTION, SOLUTION INTRAMUSCULAR; INTRAVENOUS ONCE
Status: DISCONTINUED | OUTPATIENT
Start: 2020-09-29 | End: 2020-09-29 | Stop reason: HOSPADM

## 2020-09-29 RX ORDER — PROPOFOL 10 MG/ML
INJECTION, EMULSION INTRAVENOUS AS NEEDED
Status: DISCONTINUED | OUTPATIENT
Start: 2020-09-29 | End: 2020-09-29 | Stop reason: HOSPADM

## 2020-09-29 RX ORDER — CEFAZOLIN SODIUM/WATER 2 G/20 ML
SYRINGE (ML) INTRAVENOUS AS NEEDED
Status: DISCONTINUED | OUTPATIENT
Start: 2020-09-29 | End: 2020-09-29 | Stop reason: HOSPADM

## 2020-09-29 RX ORDER — ONDANSETRON 2 MG/ML
INJECTION INTRAMUSCULAR; INTRAVENOUS AS NEEDED
Status: DISCONTINUED | OUTPATIENT
Start: 2020-09-29 | End: 2020-09-29 | Stop reason: HOSPADM

## 2020-09-29 RX ORDER — LIDOCAINE HYDROCHLORIDE 10 MG/ML
0.1 INJECTION INFILTRATION; PERINEURAL AS NEEDED
Status: DISCONTINUED | OUTPATIENT
Start: 2020-09-29 | End: 2020-09-29 | Stop reason: HOSPADM

## 2020-09-29 RX ORDER — MIDAZOLAM HYDROCHLORIDE 1 MG/ML
2 INJECTION, SOLUTION INTRAMUSCULAR; INTRAVENOUS
Status: DISCONTINUED | OUTPATIENT
Start: 2020-09-29 | End: 2020-09-29 | Stop reason: HOSPADM

## 2020-09-29 RX ORDER — PHENAZOPYRIDINE HYDROCHLORIDE 200 MG/1
200 TABLET, FILM COATED ORAL
Qty: 9 TAB | Refills: 0 | Status: SHIPPED | OUTPATIENT
Start: 2020-09-29 | End: 2020-10-02

## 2020-09-29 RX ORDER — NALOXONE HYDROCHLORIDE 0.4 MG/ML
0.1 INJECTION, SOLUTION INTRAMUSCULAR; INTRAVENOUS; SUBCUTANEOUS AS NEEDED
Status: DISCONTINUED | OUTPATIENT
Start: 2020-09-29 | End: 2020-09-29 | Stop reason: HOSPADM

## 2020-09-29 RX ORDER — HYDROMORPHONE HYDROCHLORIDE 2 MG/ML
0.5 INJECTION, SOLUTION INTRAMUSCULAR; INTRAVENOUS; SUBCUTANEOUS
Status: DISCONTINUED | OUTPATIENT
Start: 2020-09-29 | End: 2020-09-29 | Stop reason: HOSPADM

## 2020-09-29 RX ADMIN — Medication 2 G: at 10:25

## 2020-09-29 RX ADMIN — PHENYLEPHRINE HYDROCHLORIDE 200 MCG: 10 INJECTION INTRAVENOUS at 10:48

## 2020-09-29 RX ADMIN — DEXAMETHASONE SODIUM PHOSPHATE 4 MG: 4 INJECTION, SOLUTION INTRAMUSCULAR; INTRAVENOUS at 10:29

## 2020-09-29 RX ADMIN — LIDOCAINE HYDROCHLORIDE 80 MG: 20 INJECTION, SOLUTION EPIDURAL; INFILTRATION; INTRACAUDAL; PERINEURAL at 10:21

## 2020-09-29 RX ADMIN — PROPOFOL 200 MG: 10 INJECTION, EMULSION INTRAVENOUS at 10:21

## 2020-09-29 RX ADMIN — ONDANSETRON 4 MG: 2 INJECTION INTRAMUSCULAR; INTRAVENOUS at 10:29

## 2020-09-29 RX ADMIN — SODIUM CHLORIDE, SODIUM LACTATE, POTASSIUM CHLORIDE, AND CALCIUM CHLORIDE 100 ML/HR: 600; 310; 30; 20 INJECTION, SOLUTION INTRAVENOUS at 08:27

## 2020-09-29 RX ADMIN — PHENYLEPHRINE HYDROCHLORIDE 100 MCG: 10 INJECTION INTRAVENOUS at 10:31

## 2020-09-29 RX ADMIN — FAMOTIDINE 20 MG: 20 TABLET, FILM COATED ORAL at 08:26

## 2020-09-29 RX ADMIN — CEFAZOLIN SODIUM 2 G: 100 INJECTION, POWDER, LYOPHILIZED, FOR SOLUTION INTRAVENOUS at 08:26

## 2020-09-29 RX ADMIN — FENTANYL CITRATE 25 MCG: 50 INJECTION INTRAMUSCULAR; INTRAVENOUS at 10:42

## 2020-09-29 RX ADMIN — FENTANYL CITRATE 50 MCG: 50 INJECTION INTRAMUSCULAR; INTRAVENOUS at 10:39

## 2020-09-29 RX ADMIN — FENTANYL CITRATE 25 MCG: 50 INJECTION INTRAMUSCULAR; INTRAVENOUS at 10:27

## 2020-09-29 NOTE — ANESTHESIA POSTPROCEDURE EVALUATION
Procedure(s):  UROLIFT. general    Anesthesia Post Evaluation      Multimodal analgesia: multimodal analgesia used between 6 hours prior to anesthesia start to PACU discharge  Patient location during evaluation: PACU  Patient participation: complete - patient participated  Level of consciousness: awake and alert  Pain management: adequate  Airway patency: patent  Anesthetic complications: no  Cardiovascular status: acceptable  Respiratory status: acceptable  Hydration status: acceptable  Post anesthesia nausea and vomiting:  none      INITIAL Post-op Vital signs:   Vitals Value Taken Time   /58 9/29/2020 11:35 AM   Temp     Pulse 76 9/29/2020 11:37 AM   Resp 15 9/29/2020 11:35 AM   SpO2 96 % 9/29/2020 11:37 AM   Vitals shown include unvalidated device data.
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
## 2020-09-29 NOTE — DISCHARGE INSTRUCTIONS
You will be called to schedule follow up in 2-3 weeks. Please call my office at [phone] with any questions/concerns.  It is normal to have burning, blood in the urine, urgency, frequency for 1-2 weeks after the procedure.  If you cannot urinate, have severe bleeding or develop fever > 100.5, please contact my office immediately or proceed to the emergency room. Your Recovery  Your urethra may be sore at first, and it may burn when you urinate for the first few days after the procedure. You may feel the need to urinate more often, and your urine may be pink. These symptoms should get better in a few days. You will probably be able to go back to work or most of your usual activities in a couple days. How can you care for yourself at home? Activity  · Rest when you feel tired. Getting enough sleep will help you recover. · Try to walk each day. Start by walking a little more than you did the day before. Bit by bit, increase the amount you walk. Walking boosts blood flow and helps prevent pneumonia and constipation. · Avoid strenuous activities, such as bicycle riding, jogging, weight lifting, or aerobic exercise, until your doctor says it is okay. · Ask your doctor when you can drive again. · Most people are able to return to work within 1 or 2 days after the procedure. · You may shower as usual.  · Ask your doctor when it is okay for you to have sex. Diet  · You can eat your normal diet. If your stomach is upset, try bland, low-fat foods like plain rice, broiled chicken, toast, and yogurt. · Drink plenty of fluids (unless your doctor tells you not to). Medicines  · If you take a narcotic pain medication, take a stool softender. Follow-up care is a key part of your treatment and safety. Be sure to make and go to all appointments, and call your doctor if you are having problems. It's also a good idea to know your test results and keep a list of the medicines you take.   When should you call for help?  Call 911 anytime you think you may need emergency care. For example, call if:  · You passed out (lost consciousness). · You have severe trouble breathing. · You have sudden chest pain and shortness of breath, or you cough up blood. · You have severe belly pain. Call your doctor now or seek immediate medical care if:  · You are sick to your stomach or cannot keep fluids down. · Your urine is still red or you see blood clots after you have urinated several times. · You have trouble passing urine. · You have signs of a blood clot, such as:  ¨ Pain in your calf, back of the knee, thigh, or groin. ¨ Redness and swelling in your leg or groin. · You develop a fever or severe chills. Watch closely for changes in your health, and be sure to contact your doctor if:  · A burning feeling is normal for a day or two after the test, but call if it does not get better. · You have a frequent urge to urinate but can pass only small amounts of urine. · It is normal for the urine to have a pinkish color for a few days after the test, but call if it does not get better or if your urine is cloudy, smells bad, or has pus in it. After general anesthesia or intravenous sedation, for 24 hours or while taking prescription Narcotics:  · Limit your activities  · A responsible adult needs to be with you for the next 24 hours  · Do not drive and operate hazardous machinery  · Do not make important personal or business decisions  · Do not drink alcoholic beverages  · If you have not urinated within 8 hours after discharge, please contact your surgeon on call. · If you have sleep apnea and have a CPAP machine, please use it for all naps and sleeping. · Please use caution when taking narcotics and any of your home medications that may cause drowsiness. *  Please give a list of your current medications to your Primary Care Provider.   *  Please update this list whenever your medications are discontinued, doses are changed, or new medications (including over-the-counter products) are added. *  Please carry medication information at all times in case of emergency situations. These are general instructions for a healthy lifestyle:  No smoking/ No tobacco products/ Avoid exposure to second hand smoke  Surgeon General's Warning:  Quitting smoking now greatly reduces serious risk to your health. Obesity, smoking, and sedentary lifestyle greatly increases your risk for illness  A healthy diet, regular physical exercise & weight monitoring are important for maintaining a healthy lifestyle    You may be retaining fluid if you have a history of heart failure or if you experience any of the following symptoms:  Weight gain of 3 pounds or more overnight or 5 pounds in a week, increased swelling in our hands or feet or shortness of breath while lying flat in bed. Please call your doctor as soon as you notice any of these symptoms; do not wait until your next office visit.

## 2020-09-29 NOTE — ANESTHESIA PREPROCEDURE EVALUATION
Relevant Problems   No relevant active problems       Anesthetic History               Review of Systems / Medical History  Patient summary reviewed and pertinent labs reviewed    Pulmonary          Smoker         Neuro/Psych              Cardiovascular    Hypertension          Past MI (1999), CAD, cardiac stents (1999) and hyperlipidemia    Exercise tolerance: >4 METS     GI/Hepatic/Renal         Renal disease: CRI       Endo/Other    Diabetes: type 2         Other Findings            Physical Exam    Airway  Mallampati: II  TM Distance: > 6 cm  Neck ROM: normal range of motion   Mouth opening: Normal     Cardiovascular  Regular rate and rhythm,  S1 and S2 normal,  no murmur, click, rub, or gallop             Dental  No notable dental hx       Pulmonary  Breath sounds clear to auscultation               Abdominal         Other Findings            Anesthetic Plan    ASA: 3  Anesthesia type: general            Anesthetic plan and risks discussed with: Patient

## 2020-09-29 NOTE — OP NOTES
[facility] OPERATIVE REPORT     Name:  Kishor Angela  MR#:  [id number]  :   1950    DATE OF SERVICE:  2020     PREOPERATIVE DIAGNOSIS:  Benign prostatic hyperplasia/lower urinary tract symptoms. POSTOPERATIVE DIAGNOSIS:  Benign prostatic hyperplasia/lower urinary tract symptoms. PROCEDURE PERFORMED:  UroLift. SURGEON:  Maribel Mcbride MD     ASSISTANT: None     ANESTHESIA:  General.     COMPLICATIONS:  None. SPECIMENS REMOVED:  None. FINDINGS:  Lateral lobe hyperplasia bilaterally, No significant median lobe, Deployment of 6 UroLift implants with an open anterior channel at the end of the case. IMPLANTS:  UroLift     ESTIMATED BLOOD LOSS:  1 mL. INDICATIONS FOR OPERATIVE PROCEDURE:  The patient is a 72year old gentleman with symptomatic BPH/LUTS refractory to medications who opted for UroLift procedure today. Preoperative cystoscopy revealed no significant median lobe, but did show lateral lobe hyperplasia. He was counseled extensively on management options and risks/benefits and opted to proceed today with Urolift. DESCRIPTION OF OPERATIVE PROCEDURE:  After informed consent was obtained, the correct patient was identified in preoperative holding area, he was taken back to operating suite and placed on the table in the supine position. Time-out was performed confirming the correct patient and planned procedure. He received 2 g of IV Ancef prior to smooth induction of general endotracheal anesthesia. He was then moved to the dorsal lithotomy position, prepped and draped in usual sterile fashion. I began the case by inserting a 20-Icelandic rigid urolift cystoscope with the visual obturator into the urethra and advanced it into the patient's bladder. .  Pancystoscopy was performed which was unremarkable. There was no significant median lobe and but significant lateral lobe bilateral hypertrophy.   I then removed the cystoscope back to the verumontanum and examined the prostate very carefully. At this point, I then placed the cystoscope back into the patient's bladder. I removed the obturator and loaded the first UroLift implant onto the camera. I then turned to the right anterolateral position, about 10 o'clock on the clock face, backed the implant system into the patient's prostatic urethra about 2 cm distal to the bladder neck. I then compressed the prostate tissue laterally and then fired the implant into the prostate and released it. The implant appeared in good position. I then came back to the midline and reinserted my scope into the patient's bladder. I removed the implant device and loaded a second implant. I then turned my attention to the 2 o'clock position on the clock face on the left side of the patient and performed gentle compression of the prostate again about 2 cm distal to the bladder neck, just across from my first implant. I injected the second implant in this location which helped to create an open anterior channel. I then again moved back into the bladder before removing the implant device. I then proceeded to deploy two more implants at the verumontanum distally to create an open anterior channel in the patient's prostate gland. These implants were delivered in the exact same fashion as the initial two. I then switched back to the visual obturator after the delivery of four implants and investigated the prostatic urethra.  2 additional implants were placed beyond the typical four to allow for an open channel. After placement of all of the implants, final cystoscopy using the visual obturator showed a nice wide open anterior channel at the end of the case. I then drained the bladder completely. I inspected the prostatic urethra. There was no evidence of any significant bleeding, no evidence of any injury to the bladder neck or bladder and all of the implants appear to be in a satisfactory location.   At this point, I then removed the rigid cystoscope and did not leave a Hinton catheter due to the open channel, minimal bleeding and low risk of urinary retention. The patient was then awoken from anesthesia and transferred to the PACU in stable condition. He tolerated the procedure well. There were no complications. All counts were correct at the end of the procedure. He will follow up in the office in 2 weeks for a symptom/PVR check. Jairo Alvarez M.D.     [facility]  [address] [place] [address],[address]  [address] [place] [address]  Phone: [phone]  Fax: [phone]

## 2020-10-16 PROBLEM — N18.31 STAGE 3A CHRONIC KIDNEY DISEASE (HCC): Status: ACTIVE | Noted: 2020-02-14

## 2022-01-07 ENCOUNTER — HOSPITAL ENCOUNTER (OUTPATIENT)
Dept: SURGERY | Age: 72
Discharge: HOME | End: 2022-01-07
Payer: MEDICARE

## 2022-01-07 VITALS
OXYGEN SATURATION: 97 % | HEART RATE: 86 BPM | TEMPERATURE: 97.5 F | RESPIRATION RATE: 17 BRPM | DIASTOLIC BLOOD PRESSURE: 68 MMHG | HEIGHT: 72 IN | SYSTOLIC BLOOD PRESSURE: 139 MMHG | BODY MASS INDEX: 27.43 KG/M2 | WEIGHT: 202.5 LBS

## 2022-01-07 LAB
ANION GAP SERPL CALC-SCNC: 6 MMOL/L (ref 7–16)
APPEARANCE UR: CLEAR
BILIRUB UR QL: NEGATIVE
BUN SERPL-MCNC: 20 MG/DL (ref 8–23)
CALCIUM SERPL-MCNC: 10 MG/DL (ref 8.3–10.4)
CHLORIDE SERPL-SCNC: 108 MMOL/L (ref 98–107)
CO2 SERPL-SCNC: 26 MMOL/L (ref 21–32)
COLOR UR: YELLOW
CREAT SERPL-MCNC: 1.28 MG/DL (ref 0.8–1.5)
ERYTHROCYTE [DISTWIDTH] IN BLOOD BY AUTOMATED COUNT: 13.2 % (ref 11.9–14.6)
GLUCOSE BLD STRIP.AUTO-MCNC: 131 MG/DL (ref 65–100)
GLUCOSE SERPL-MCNC: 129 MG/DL (ref 65–100)
GLUCOSE UR STRIP.AUTO-MCNC: 250 MG/DL
HCT VFR BLD AUTO: 40.3 % (ref 41.1–50.3)
HGB BLD-MCNC: 13.8 G/DL (ref 13.6–17.2)
HGB UR QL STRIP: NEGATIVE
KETONES UR QL STRIP.AUTO: NEGATIVE MG/DL
LEUKOCYTE ESTERASE UR QL STRIP.AUTO: NEGATIVE
MCH RBC QN AUTO: 32.1 PG (ref 26.1–32.9)
MCHC RBC AUTO-ENTMCNC: 34.2 G/DL (ref 31.4–35)
MCV RBC AUTO: 93.7 FL (ref 79.6–97.8)
NITRITE UR QL STRIP.AUTO: NEGATIVE
NRBC # BLD: 0 K/UL (ref 0–0.2)
PH UR STRIP: 6.5 [PH] (ref 5–9)
PLATELET # BLD AUTO: 193 K/UL (ref 150–450)
PMV BLD AUTO: 9.8 FL (ref 9.4–12.3)
POTASSIUM SERPL-SCNC: 4.2 MMOL/L (ref 3.5–5.1)
PROT UR STRIP-MCNC: NEGATIVE MG/DL
RBC # BLD AUTO: 4.3 M/UL (ref 4.23–5.6)
SERVICE CMNT-IMP: ABNORMAL
SODIUM SERPL-SCNC: 140 MMOL/L (ref 136–145)
SP GR UR REFRACTOMETRY: 1.02 (ref 1–1.02)
UROBILINOGEN UR QL STRIP.AUTO: 1 EU/DL (ref 0.2–1)
WBC # BLD AUTO: 8 K/UL (ref 4.3–11.1)

## 2022-01-07 PROCEDURE — 82962 GLUCOSE BLOOD TEST: CPT

## 2022-01-07 PROCEDURE — 85027 COMPLETE CBC AUTOMATED: CPT

## 2022-01-07 PROCEDURE — 80048 BASIC METABOLIC PNL TOTAL CA: CPT

## 2022-01-07 PROCEDURE — 93005 ELECTROCARDIOGRAM TRACING: CPT | Performed by: ANESTHESIOLOGY

## 2022-01-07 PROCEDURE — 81003 URINALYSIS AUTO W/O SCOPE: CPT

## 2022-01-07 RX ORDER — FINASTERIDE 5 MG/1
5 TABLET, FILM COATED ORAL DAILY
COMMUNITY
End: 2022-01-27

## 2022-01-07 NOTE — PERIOP NOTES
Recent Results (from the past 12 hour(s))   GLUCOSE, POC    Collection Time: 01/07/22 11:04 AM   Result Value Ref Range    Glucose (POC) 131 (H) 65 - 100 mg/dL    Performed by Benedict    CBC W/O DIFF    Collection Time: 01/07/22 11:05 AM   Result Value Ref Range    WBC 8.0 4.3 - 11.1 K/uL    RBC 4.30 4.23 - 5.6 M/uL    HGB 13.8 13.6 - 17.2 g/dL    HCT 40.3 (L) 41.1 - 50.3 %    MCV 93.7 79.6 - 97.8 FL    MCH 32.1 26.1 - 32.9 PG    MCHC 34.2 31.4 - 35.0 g/dL    RDW 13.2 11.9 - 14.6 %    PLATELET 259 860 - 915 K/uL    MPV 9.8 9.4 - 12.3 FL    ABSOLUTE NRBC 0.00 0.0 - 0.2 K/uL   METABOLIC PANEL, BASIC    Collection Time: 01/07/22 11:05 AM   Result Value Ref Range    Sodium 140 136 - 145 mmol/L    Potassium 4.2 3.5 - 5.1 mmol/L    Chloride 108 (H) 98 - 107 mmol/L    CO2 26 21 - 32 mmol/L    Anion gap 6 (L) 7 - 16 mmol/L    Glucose 129 (H) 65 - 100 mg/dL    BUN 20 8 - 23 MG/DL    Creatinine 1.28 0.8 - 1.5 MG/DL    GFR est AA >60 >60 ml/min/1.73m2    GFR est non-AA 59 (L) >60 ml/min/1.73m2    Calcium 10.0 8.3 - 10.4 MG/DL   URINALYSIS W/ RFLX MICROSCOPIC    Collection Time: 01/07/22 11:05 AM   Result Value Ref Range    Color YELLOW      Appearance CLEAR      Specific gravity 1.017 1.001 - 1.023      pH (UA) 6.5 5.0 - 9.0      Protein Negative NEG mg/dL    Glucose 250 mg/dL    Ketone Negative NEG mg/dL    Bilirubin Negative NEG      Blood Negative NEG      Urobilinogen 1.0 0.2 - 1.0 EU/dL    Nitrites Negative NEG      Leukocyte Esterase Negative NEG     Labs reviewed and routed to Dr. Paul De Los Santos

## 2022-01-07 NOTE — PERIOP NOTES
Patient verified name and     Order for consent not found in EHR and matches case posting; patient verified. Type II surgery, walk in assessment complete. Labs per surgeon: CBC, BMP, UA;  Labs per anesthesia protocol: SQBS = 131, Type & Scree DOS, POC Glucose,  EKG: done today and acceptable per anesthesia protocol    Patient COVID test date 1/10/2022; Patient aware of the appointment. The testing center is located at the [address], [place]. If appointment is needed patient provided telephone number of [phone]. Hospital approved surgical skin cleanser and instructions given per hospital policy. Patient provided with and instructed on educational handouts including Guide to Surgery, Pain Management, Hand Hygiene, Blood Transfusion Education, and [facility] Brochure. Patient answered medical/surgical history questions at their best of ability. All prior to admission medications documented in [facility]. Original medication prescription bottle were visualized during patient appointment. Patient instructed to hold all vitamins 7 days prior to surgery and NSAIDS 5 days prior to surgery, patient verbalized understanding. Patient teach back successful and patient demonstrates knowledge of instructions.

## 2022-01-07 NOTE — PERIOP NOTES
PLEASE CONTINUE TAKING ALL PRESCRIPTION MEDICATIONS UP TO THE DAY OF SURGERY UNLESS OTHERWISE DIRECTED BELOW. DISCONTINUE all vitamins and supplements 7 days prior to surgery. DISCONTINUE Non-Steriodal Anti-Inflammatory (NSAIDS) such as Advil and Aleve 5 days prior to surgery. Home Medications to take  the day of surgery    Amlodipine (Norvasc)  Citalopram (Celexa)     Tamsulosin (Flomax)  Finasteride (Proscar)        Home Medications   to Hold   Fenofibrate (Tricor) hold morning of surgery  Lisinopril hold morning of surgery   Metformin (Glucophage) hold morning of surgery  Semaglutide (Rybelsus) hold morning of surgery     Comments    Covid test 1/10/21 @ [address] [place] [address]    On the day before surgery please take Acetaminophen 1000mg in the morning and then again before bed. You may substitute for Tylenol 650 mg. Please do not bring home medications with you on the day of surgery unless otherwise directed by your nurse. If you are instructed to bring home medications, please give them to your nurse as they will be administered by the nursing staff. If you have any questions, please call [facility] [phone] or [facility] [phone]. A copy of this note was provided to the patient for reference.

## 2022-01-08 LAB
ATRIAL RATE: 80 BPM
CALCULATED P AXIS, ECG09: 72 DEGREES
CALCULATED R AXIS, ECG10: 87 DEGREES
CALCULATED T AXIS, ECG11: 63 DEGREES
DIAGNOSIS, 93000: NORMAL
P-R INTERVAL, ECG05: 132 MS
Q-T INTERVAL, ECG07: 392 MS
QRS DURATION, ECG06: 88 MS
QTC CALCULATION (BEZET), ECG08: 452 MS
VENTRICULAR RATE, ECG03: 80 BPM

## 2022-01-12 ENCOUNTER — ANESTHESIA EVENT (OUTPATIENT)
Dept: SURGERY | Age: 72
End: 2022-01-12
Payer: MEDICARE

## 2022-01-13 ENCOUNTER — ANESTHESIA (OUTPATIENT)
Dept: SURGERY | Age: 72
End: 2022-01-13
Payer: MEDICARE

## 2022-01-13 ENCOUNTER — HOSPITAL ENCOUNTER (OUTPATIENT)
Age: 72
Discharge: HOME | End: 2022-01-14
Attending: UROLOGY | Admitting: UROLOGY
Payer: MEDICARE

## 2022-01-13 DIAGNOSIS — Z90.79 S/P TURP: Primary | ICD-10-CM

## 2022-01-13 DIAGNOSIS — N40.0 BENIGN NON-NODULAR PROSTATIC HYPERPLASIA WITHOUT LOWER URINARY TRACT SYMPTOMS: ICD-10-CM

## 2022-01-13 LAB
ABO + RH BLD: NORMAL
ANION GAP SERPL CALC-SCNC: 5 MMOL/L (ref 7–16)
BLOOD GROUP ANTIBODIES SERPL: NORMAL
BUN SERPL-MCNC: 15 MG/DL (ref 8–23)
CALCIUM SERPL-MCNC: 9.8 MG/DL (ref 8.3–10.4)
CHLORIDE SERPL-SCNC: 110 MMOL/L (ref 98–107)
CO2 SERPL-SCNC: 27 MMOL/L (ref 21–32)
CREAT SERPL-MCNC: 1.05 MG/DL (ref 0.8–1.5)
ERYTHROCYTE [DISTWIDTH] IN BLOOD BY AUTOMATED COUNT: 13.2 % (ref 11.9–14.6)
EST. AVERAGE GLUCOSE BLD GHB EST-MCNC: 146 MG/DL
GLUCOSE BLD STRIP.AUTO-MCNC: 157 MG/DL (ref 65–100)
GLUCOSE BLD STRIP.AUTO-MCNC: 160 MG/DL (ref 65–100)
GLUCOSE BLD STRIP.AUTO-MCNC: 183 MG/DL (ref 65–100)
GLUCOSE SERPL-MCNC: 169 MG/DL (ref 65–100)
HBA1C MFR BLD: 6.7 % (ref 4.2–6.3)
HCT VFR BLD AUTO: 39.4 % (ref 41.1–50.3)
HGB BLD-MCNC: 13.5 G/DL (ref 13.6–17.2)
MCH RBC QN AUTO: 32.6 PG (ref 26.1–32.9)
MCHC RBC AUTO-ENTMCNC: 34.3 G/DL (ref 31.4–35)
MCV RBC AUTO: 95.2 FL (ref 79.6–97.8)
NRBC # BLD: 0 K/UL (ref 0–0.2)
PLATELET # BLD AUTO: 176 K/UL (ref 150–450)
PMV BLD AUTO: 9.6 FL (ref 9.4–12.3)
POTASSIUM SERPL-SCNC: 4.1 MMOL/L (ref 3.5–5.1)
RBC # BLD AUTO: 4.14 M/UL (ref 4.23–5.6)
SERVICE CMNT-IMP: ABNORMAL
SODIUM SERPL-SCNC: 142 MMOL/L (ref 138–145)
SPECIMEN EXP DATE BLD: NORMAL
WBC # BLD AUTO: 8.7 K/UL (ref 4.3–11.1)

## 2022-01-13 PROCEDURE — 77030040361 HC SLV COMPR DVT MDII -B: Performed by: UROLOGY

## 2022-01-13 PROCEDURE — 86900 BLOOD TYPING SEROLOGIC ABO: CPT

## 2022-01-13 PROCEDURE — 74011250637 HC RX REV CODE- 250/637: Performed by: ANESTHESIOLOGY

## 2022-01-13 PROCEDURE — 77030005546 HC CATH URETH FOL 3W BARD -A: Performed by: UROLOGY

## 2022-01-13 PROCEDURE — 88305 TISSUE EXAM BY PATHOLOGIST: CPT

## 2022-01-13 PROCEDURE — 82962 GLUCOSE BLOOD TEST: CPT

## 2022-01-13 PROCEDURE — 80048 BASIC METABOLIC PNL TOTAL CA: CPT

## 2022-01-13 PROCEDURE — 74011250636 HC RX REV CODE- 250/636: Performed by: ANESTHESIOLOGY

## 2022-01-13 PROCEDURE — 2709999900 HC NON-CHARGEABLE SUPPLY: Performed by: UROLOGY

## 2022-01-13 PROCEDURE — 74011000250 HC RX REV CODE- 250: Performed by: UROLOGY

## 2022-01-13 PROCEDURE — 76060000035 HC ANESTHESIA 2 TO 2.5 HR: Performed by: UROLOGY

## 2022-01-13 PROCEDURE — 77030041444 HC ELECTRD PSS QUICK-FIRE PSSU -D: Performed by: UROLOGY

## 2022-01-13 PROCEDURE — 85027 COMPLETE CBC AUTOMATED: CPT

## 2022-01-13 PROCEDURE — 74011250636 HC RX REV CODE- 250/636: Performed by: UROLOGY

## 2022-01-13 PROCEDURE — 76210000006 HC OR PH I REC 0.5 TO 1 HR: Performed by: UROLOGY

## 2022-01-13 PROCEDURE — 74011636637 HC RX REV CODE- 636/637: Performed by: UROLOGY

## 2022-01-13 PROCEDURE — 83036 HEMOGLOBIN GLYCOSYLATED A1C: CPT

## 2022-01-13 PROCEDURE — 52601 PROSTATECTOMY (TURP): CPT | Performed by: UROLOGY

## 2022-01-13 PROCEDURE — 77030010509 HC AIRWY LMA MSK TELE -A: Performed by: ANESTHESIOLOGY

## 2022-01-13 PROCEDURE — 2709999900 HC NON-CHARGEABLE SUPPLY

## 2022-01-13 PROCEDURE — 77030027138 HC INCENT SPIROMETER -A

## 2022-01-13 PROCEDURE — 74011250637 HC RX REV CODE- 250/637: Performed by: UROLOGY

## 2022-01-13 PROCEDURE — 74011000250 HC RX REV CODE- 250: Performed by: ANESTHESIOLOGY

## 2022-01-13 PROCEDURE — 77030019908 HC STETH ESOPH SIMS -A: Performed by: ANESTHESIOLOGY

## 2022-01-13 PROCEDURE — 77030040922 HC BLNKT HYPOTHRM STRY -A: Performed by: ANESTHESIOLOGY

## 2022-01-13 PROCEDURE — 76010000131 HC OR TIME 2 TO 2.5 HR: Performed by: UROLOGY

## 2022-01-13 PROCEDURE — 77030040831 HC BAG URINE DRNG MDII -A: Performed by: UROLOGY

## 2022-01-13 RX ORDER — SODIUM CHLORIDE, SODIUM LACTATE, POTASSIUM CHLORIDE, CALCIUM CHLORIDE 600; 310; 30; 20 MG/100ML; MG/100ML; MG/100ML; MG/100ML
1000 INJECTION, SOLUTION INTRAVENOUS CONTINUOUS
Status: DISCONTINUED | OUTPATIENT
Start: 2022-01-13 | End: 2022-01-13 | Stop reason: HOSPADM

## 2022-01-13 RX ORDER — CITALOPRAM 10 MG/1
20 TABLET ORAL DAILY
Status: DISCONTINUED | OUTPATIENT
Start: 2022-01-14 | End: 2022-01-14 | Stop reason: HOSPADM

## 2022-01-13 RX ORDER — OXYCODONE HYDROCHLORIDE 5 MG/1
5 TABLET ORAL
Status: DISCONTINUED | OUTPATIENT
Start: 2022-01-13 | End: 2022-01-13 | Stop reason: HOSPADM

## 2022-01-13 RX ORDER — SODIUM CHLORIDE 0.9 % (FLUSH) 0.9 %
5-40 SYRINGE (ML) INJECTION EVERY 8 HOURS
Status: DISCONTINUED | OUTPATIENT
Start: 2022-01-13 | End: 2022-01-14 | Stop reason: HOSPADM

## 2022-01-13 RX ORDER — TAMSULOSIN HYDROCHLORIDE 0.4 MG/1
0.4 CAPSULE ORAL DAILY
Status: DISCONTINUED | OUTPATIENT
Start: 2022-01-14 | End: 2022-01-14 | Stop reason: HOSPADM

## 2022-01-13 RX ORDER — SODIUM CHLORIDE 9 MG/ML
75 INJECTION, SOLUTION INTRAVENOUS CONTINUOUS
Status: DISCONTINUED | OUTPATIENT
Start: 2022-01-13 | End: 2022-01-14 | Stop reason: HOSPADM

## 2022-01-13 RX ORDER — ALBUTEROL SULFATE 0.83 MG/ML
2.5 SOLUTION RESPIRATORY (INHALATION) AS NEEDED
Status: DISCONTINUED | OUTPATIENT
Start: 2022-01-13 | End: 2022-01-13 | Stop reason: HOSPADM

## 2022-01-13 RX ORDER — LIDOCAINE HYDROCHLORIDE 10 MG/ML
0.1 INJECTION INFILTRATION; PERINEURAL AS NEEDED
Status: DISCONTINUED | OUTPATIENT
Start: 2022-01-13 | End: 2022-01-13 | Stop reason: HOSPADM

## 2022-01-13 RX ORDER — AMLODIPINE BESYLATE 10 MG/1
10 TABLET ORAL DAILY
Status: DISCONTINUED | OUTPATIENT
Start: 2022-01-14 | End: 2022-01-14 | Stop reason: HOSPADM

## 2022-01-13 RX ORDER — ACETAMINOPHEN 500 MG
1000 TABLET ORAL ONCE
Status: COMPLETED | OUTPATIENT
Start: 2022-01-13 | End: 2022-01-13

## 2022-01-13 RX ORDER — NALOXONE HYDROCHLORIDE 0.4 MG/ML
0.4 INJECTION, SOLUTION INTRAMUSCULAR; INTRAVENOUS; SUBCUTANEOUS AS NEEDED
Status: DISCONTINUED | OUTPATIENT
Start: 2022-01-13 | End: 2022-01-14 | Stop reason: HOSPADM

## 2022-01-13 RX ORDER — PROPOFOL 10 MG/ML
INJECTION, EMULSION INTRAVENOUS AS NEEDED
Status: DISCONTINUED | OUTPATIENT
Start: 2022-01-13 | End: 2022-01-13 | Stop reason: HOSPADM

## 2022-01-13 RX ORDER — ONDANSETRON 2 MG/ML
4 INJECTION INTRAMUSCULAR; INTRAVENOUS
Status: DISCONTINUED | OUTPATIENT
Start: 2022-01-13 | End: 2022-01-13 | Stop reason: HOSPADM

## 2022-01-13 RX ORDER — CEFAZOLIN SODIUM/WATER 2 G/20 ML
2 SYRINGE (ML) INTRAVENOUS
Status: COMPLETED | OUTPATIENT
Start: 2022-01-13 | End: 2022-01-13

## 2022-01-13 RX ORDER — HYDROMORPHONE HYDROCHLORIDE 2 MG/ML
0.5 INJECTION, SOLUTION INTRAMUSCULAR; INTRAVENOUS; SUBCUTANEOUS
Status: DISCONTINUED | OUTPATIENT
Start: 2022-01-13 | End: 2022-01-13 | Stop reason: HOSPADM

## 2022-01-13 RX ORDER — HYOSCYAMINE SULFATE 0.12 MG/1
0.12 TABLET SUBLINGUAL
Status: DISCONTINUED | OUTPATIENT
Start: 2022-01-13 | End: 2022-01-14 | Stop reason: HOSPADM

## 2022-01-13 RX ORDER — ASPIRIN 81 MG/1
81 TABLET ORAL
Status: DISCONTINUED | OUTPATIENT
Start: 2022-01-13 | End: 2022-01-14 | Stop reason: HOSPADM

## 2022-01-13 RX ORDER — ONDANSETRON 2 MG/ML
INJECTION INTRAMUSCULAR; INTRAVENOUS AS NEEDED
Status: DISCONTINUED | OUTPATIENT
Start: 2022-01-13 | End: 2022-01-13 | Stop reason: HOSPADM

## 2022-01-13 RX ORDER — ATORVASTATIN CALCIUM 40 MG/1
80 TABLET, FILM COATED ORAL DAILY
Status: DISCONTINUED | OUTPATIENT
Start: 2022-01-14 | End: 2022-01-14 | Stop reason: HOSPADM

## 2022-01-13 RX ORDER — FINASTERIDE 5 MG/1
5 TABLET, FILM COATED ORAL DAILY
Status: DISCONTINUED | OUTPATIENT
Start: 2022-01-14 | End: 2022-01-14 | Stop reason: HOSPADM

## 2022-01-13 RX ORDER — FENTANYL CITRATE 50 UG/ML
INJECTION, SOLUTION INTRAMUSCULAR; INTRAVENOUS AS NEEDED
Status: DISCONTINUED | OUTPATIENT
Start: 2022-01-13 | End: 2022-01-13 | Stop reason: HOSPADM

## 2022-01-13 RX ORDER — LISINOPRIL 20 MG/1
20 TABLET ORAL DAILY
Status: DISCONTINUED | OUTPATIENT
Start: 2022-01-14 | End: 2022-01-14 | Stop reason: HOSPADM

## 2022-01-13 RX ORDER — MORPHINE SULFATE 2 MG/ML
2 INJECTION, SOLUTION INTRAMUSCULAR; INTRAVENOUS
Status: DISCONTINUED | OUTPATIENT
Start: 2022-01-13 | End: 2022-01-14 | Stop reason: HOSPADM

## 2022-01-13 RX ORDER — SODIUM CHLORIDE 0.9 % (FLUSH) 0.9 %
5-40 SYRINGE (ML) INJECTION AS NEEDED
Status: DISCONTINUED | OUTPATIENT
Start: 2022-01-13 | End: 2022-01-14 | Stop reason: HOSPADM

## 2022-01-13 RX ORDER — ATROPA BELLADONNA AND OPIUM 16.2; 6 MG/1; MG/1
1 SUPPOSITORY RECTAL
Status: DISCONTINUED | OUTPATIENT
Start: 2022-01-13 | End: 2022-01-14 | Stop reason: HOSPADM

## 2022-01-13 RX ORDER — AMOXICILLIN 250 MG
1 CAPSULE ORAL
Status: DISCONTINUED | OUTPATIENT
Start: 2022-01-13 | End: 2022-01-14 | Stop reason: HOSPADM

## 2022-01-13 RX ORDER — FENOFIBRATE 160 MG/1
160 TABLET ORAL DAILY
Status: DISCONTINUED | OUTPATIENT
Start: 2022-01-14 | End: 2022-01-14 | Stop reason: HOSPADM

## 2022-01-13 RX ORDER — CEFAZOLIN SODIUM/WATER 2 G/20 ML
2 SYRINGE (ML) INTRAVENOUS EVERY 8 HOURS
Status: COMPLETED | OUTPATIENT
Start: 2022-01-13 | End: 2022-01-14

## 2022-01-13 RX ORDER — LIDOCAINE HYDROCHLORIDE 20 MG/ML
INJECTION, SOLUTION EPIDURAL; INFILTRATION; INTRACAUDAL; PERINEURAL AS NEEDED
Status: DISCONTINUED | OUTPATIENT
Start: 2022-01-13 | End: 2022-01-13 | Stop reason: HOSPADM

## 2022-01-13 RX ORDER — TRAZODONE HYDROCHLORIDE 50 MG/1
50 TABLET ORAL
Status: DISCONTINUED | OUTPATIENT
Start: 2022-01-13 | End: 2022-01-14 | Stop reason: HOSPADM

## 2022-01-13 RX ORDER — ACETAMINOPHEN 325 MG/1
650 TABLET ORAL
Status: DISCONTINUED | OUTPATIENT
Start: 2022-01-13 | End: 2022-01-14 | Stop reason: HOSPADM

## 2022-01-13 RX ORDER — ONDANSETRON 2 MG/ML
4 INJECTION INTRAMUSCULAR; INTRAVENOUS
Status: DISCONTINUED | OUTPATIENT
Start: 2022-01-13 | End: 2022-01-14 | Stop reason: HOSPADM

## 2022-01-13 RX ORDER — MIDAZOLAM HYDROCHLORIDE 1 MG/ML
2 INJECTION, SOLUTION INTRAMUSCULAR; INTRAVENOUS
Status: DISCONTINUED | OUTPATIENT
Start: 2022-01-13 | End: 2022-01-13 | Stop reason: HOSPADM

## 2022-01-13 RX ORDER — OXYCODONE HYDROCHLORIDE 5 MG/1
5-15 TABLET ORAL
Status: DISCONTINUED | OUTPATIENT
Start: 2022-01-13 | End: 2022-01-14 | Stop reason: HOSPADM

## 2022-01-13 RX ADMIN — LIDOCAINE HYDROCHLORIDE 100 MG: 20 INJECTION, SOLUTION EPIDURAL; INFILTRATION; INTRACAUDAL; PERINEURAL at 07:39

## 2022-01-13 RX ADMIN — SODIUM CHLORIDE, SODIUM LACTATE, POTASSIUM CHLORIDE, AND CALCIUM CHLORIDE: 600; 310; 30; 20 INJECTION, SOLUTION INTRAVENOUS at 08:39

## 2022-01-13 RX ADMIN — FENTANYL CITRATE 25 MCG: 50 INJECTION INTRAMUSCULAR; INTRAVENOUS at 08:29

## 2022-01-13 RX ADMIN — FENTANYL CITRATE 25 MCG: 50 INJECTION INTRAMUSCULAR; INTRAVENOUS at 08:46

## 2022-01-13 RX ADMIN — INSULIN HUMAN 2 UNITS: 100 INJECTION, SOLUTION PARENTERAL at 17:05

## 2022-01-13 RX ADMIN — PROPOFOL 50 MG: 10 INJECTION, EMULSION INTRAVENOUS at 07:54

## 2022-01-13 RX ADMIN — PHENYLEPHRINE HYDROCHLORIDE 100 MCG: 10 INJECTION INTRAVENOUS at 08:39

## 2022-01-13 RX ADMIN — SODIUM CHLORIDE, PRESERVATIVE FREE 10 ML: 5 INJECTION INTRAVENOUS at 21:06

## 2022-01-13 RX ADMIN — ONDANSETRON 4 MG: 2 INJECTION INTRAMUSCULAR; INTRAVENOUS at 07:57

## 2022-01-13 RX ADMIN — FENTANYL CITRATE 50 MCG: 50 INJECTION INTRAMUSCULAR; INTRAVENOUS at 07:57

## 2022-01-13 RX ADMIN — SODIUM CHLORIDE, PRESERVATIVE FREE 10 ML: 5 INJECTION INTRAVENOUS at 15:46

## 2022-01-13 RX ADMIN — FENTANYL CITRATE 50 MCG: 50 INJECTION INTRAMUSCULAR; INTRAVENOUS at 07:48

## 2022-01-13 RX ADMIN — SODIUM CHLORIDE, SODIUM LACTATE, POTASSIUM CHLORIDE, AND CALCIUM CHLORIDE 1000 ML: 600; 310; 30; 20 INJECTION, SOLUTION INTRAVENOUS at 06:56

## 2022-01-13 RX ADMIN — ASPIRIN 81 MG: 81 TABLET ORAL at 21:06

## 2022-01-13 RX ADMIN — TRAZODONE HYDROCHLORIDE 50 MG: 50 TABLET ORAL at 21:06

## 2022-01-13 RX ADMIN — CEFAZOLIN SODIUM 2 G: 100 INJECTION, POWDER, LYOPHILIZED, FOR SOLUTION INTRAVENOUS at 17:05

## 2022-01-13 RX ADMIN — FENTANYL CITRATE 50 MCG: 50 INJECTION INTRAMUSCULAR; INTRAVENOUS at 07:39

## 2022-01-13 RX ADMIN — PROPOFOL 200 MG: 10 INJECTION, EMULSION INTRAVENOUS at 07:39

## 2022-01-13 RX ADMIN — ACETAMINOPHEN 1000 MG: 500 TABLET ORAL at 06:55

## 2022-01-13 RX ADMIN — CEFAZOLIN SODIUM 2 G: 100 INJECTION, POWDER, LYOPHILIZED, FOR SOLUTION INTRAVENOUS at 21:08

## 2022-01-13 RX ADMIN — SODIUM CHLORIDE 75 ML/HR: 900 INJECTION, SOLUTION INTRAVENOUS at 15:44

## 2022-01-13 RX ADMIN — PHENYLEPHRINE HYDROCHLORIDE 100 MCG: 10 INJECTION INTRAVENOUS at 07:39

## 2022-01-13 RX ADMIN — PHENYLEPHRINE HYDROCHLORIDE 100 MCG: 10 INJECTION INTRAVENOUS at 08:41

## 2022-01-13 RX ADMIN — Medication 2 G: at 07:45

## 2022-01-13 NOTE — ANESTHESIA POSTPROCEDURE EVALUATION
Procedure(s):  CYSTOCOPY, DILATION OF URETHERAL STRICTURE, BIPOLAR TRANSURETHRAL RESECTION OF PROSTATE.    general    Anesthesia Post Evaluation      Multimodal analgesia: multimodal analgesia used between 6 hours prior to anesthesia start to PACU discharge  Patient location during evaluation: bedside  Patient participation: complete - patient participated  Level of consciousness: awake and responsive to light touch  Pain management: adequate  Airway patency: patent  Anesthetic complications: no  Cardiovascular status: acceptable, hemodynamically stable, blood pressure returned to baseline and stable  Respiratory status: acceptable, unassisted, spontaneous ventilation and nonlabored ventilation  Hydration status: acceptable  Post anesthesia nausea and vomiting:  controlled      INITIAL Post-op Vital signs:   Vitals Value Taken Time   /74 01/13/22 1014   Temp 36.6 °C (97.9 °F) 01/13/22 0955   Pulse 72 01/13/22 1017   Resp 16 01/13/22 1005   SpO2 97 % 01/13/22 1017   Vitals shown include unvalidated device data.

## 2022-01-13 NOTE — PROGRESS NOTES
01/13/22 1507   Dual Skin Pressure Injury Assessment   Dual Skin Pressure Injury Assessment WDL   Skin Integumentary   Skin Integumentary (WDL) WDL    Pressure  Injury Documentation No Pressure Injury Noted-Pressure Ulcer Prevention Initiated   Skin Color Appropriate for ethnicity   Skin Condition/Temp Warm;Dry   Skin Integrity Incision (comment)  (perineum)   Turgor Non-tenting   Hair Growth Sparce   Nails WDL   Varicosities Absent

## 2022-01-13 NOTE — ANESTHESIA PREPROCEDURE EVALUATION
Relevant Problems   No relevant active problems       Anesthetic History               Review of Systems / Medical History  Patient summary reviewed and pertinent labs reviewed    Pulmonary        Sleep apnea  Smoker (48 pyh)         Neuro/Psych           Pertinent negatives: No CVA   Cardiovascular    Hypertension          Past MI (1999), CAD, cardiac stents (1999) and hyperlipidemia    Exercise tolerance: >4 METS     GI/Hepatic/Renal         Renal disease: CRI       Endo/Other    Diabetes: well controlled, type 2    Arthritis     Other Findings   Comments: ACDF           Physical Exam    Airway  Mallampati: II  TM Distance: > 6 cm  Neck ROM: normal range of motion   Mouth opening: Normal     Cardiovascular  Regular rate and rhythm,  S1 and S2 normal,  no murmur, click, rub, or gallop  Rhythm: regular  Rate: normal      Pertinent negatives: No murmur   Dental  No notable dental hx       Pulmonary  Breath sounds clear to auscultation               Abdominal         Other Findings            Anesthetic Plan    ASA: 3  Anesthesia type: general          Induction: Intravenous  Anesthetic plan and risks discussed with: Patient      LMA
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
## 2022-01-13 NOTE — BRIEF OP NOTE
Brief Postoperative Note    Patient: Suly Pierce  YOB: 1950  MRN: [id number]    Date of Procedure: 1/13/2022     Pre-Op Diagnosis: Hyperplasia of prostate with lower urinary tract symptoms (LUTS) [N40.1]    Post-Op Diagnosis: Same as preoperative diagnosis. Procedure(s):  CYSTOCOPY, DILATION OF URETHERAL STRICTURE, BIPOLAR TRANSURETHRAL RESECTION OF PROSTATE    Surgeon(s):   Iza Giordano MD    Surgical Assistant: None    Anesthesia: General     Estimated Blood Loss (mL): less than 50     Complications: None    Specimens:   ID Type Source Tests Collected by Time Destination   1 : Prostate chips Preservative Prostate  Iza Giordano MD 1/13/2022 8438 Pathology        Implants: * No implants in log *    Drains: 24 3 way yoder catheter    Findings: See Op Note    Electronically Signed by Verna Gitelman, MD on 1/13/2022 at 9:44 AM

## 2022-01-14 VITALS
HEART RATE: 95 BPM | TEMPERATURE: 98.4 F | RESPIRATION RATE: 16 BRPM | HEIGHT: 72 IN | SYSTOLIC BLOOD PRESSURE: 155 MMHG | BODY MASS INDEX: 27.17 KG/M2 | DIASTOLIC BLOOD PRESSURE: 65 MMHG | WEIGHT: 200.6 LBS | OXYGEN SATURATION: 96 %

## 2022-01-14 LAB
GLUCOSE BLD STRIP.AUTO-MCNC: 165 MG/DL (ref 65–100)
GLUCOSE BLD STRIP.AUTO-MCNC: 323 MG/DL (ref 65–100)
SERVICE CMNT-IMP: ABNORMAL
SERVICE CMNT-IMP: ABNORMAL

## 2022-01-14 PROCEDURE — 74011636637 HC RX REV CODE- 636/637: Performed by: UROLOGY

## 2022-01-14 PROCEDURE — 82962 GLUCOSE BLOOD TEST: CPT

## 2022-01-14 PROCEDURE — 74011000250 HC RX REV CODE- 250: Performed by: UROLOGY

## 2022-01-14 PROCEDURE — 74011250636 HC RX REV CODE- 250/636: Performed by: UROLOGY

## 2022-01-14 PROCEDURE — 2709999900 HC NON-CHARGEABLE SUPPLY

## 2022-01-14 PROCEDURE — 74011250637 HC RX REV CODE- 250/637: Performed by: UROLOGY

## 2022-01-14 RX ORDER — CIPROFLOXACIN 500 MG/1
500 TABLET ORAL 2 TIMES DAILY
Qty: 10 TABLET | Refills: 0 | Status: SHIPPED | OUTPATIENT
Start: 2022-01-14 | End: 2022-02-09 | Stop reason: ALTCHOICE

## 2022-01-14 RX ORDER — AMOXICILLIN 250 MG
1 CAPSULE ORAL
Qty: 30 TABLET | Refills: 0 | Status: SHIPPED | OUTPATIENT
Start: 2022-01-14 | End: 2022-02-09 | Stop reason: ALTCHOICE

## 2022-01-14 RX ORDER — OXYCODONE AND ACETAMINOPHEN 5; 325 MG/1; MG/1
1 TABLET ORAL
Qty: 20 TABLET | Refills: 0 | Status: SHIPPED | OUTPATIENT
Start: 2022-01-14 | End: 2022-01-17

## 2022-01-14 RX ORDER — HYOSCYAMINE SULFATE 0.125 MG
125 TABLET ORAL
Qty: 20 TABLET | Refills: 0 | Status: SHIPPED | OUTPATIENT
Start: 2022-01-14 | End: 2022-02-09 | Stop reason: ALTCHOICE

## 2022-01-14 RX ADMIN — FENOFIBRATE 160 MG: 160 TABLET ORAL at 08:58

## 2022-01-14 RX ADMIN — LISINOPRIL 20 MG: 20 TABLET ORAL at 08:59

## 2022-01-14 RX ADMIN — SODIUM CHLORIDE, PRESERVATIVE FREE 10 ML: 5 INJECTION INTRAVENOUS at 06:14

## 2022-01-14 RX ADMIN — AMLODIPINE BESYLATE 10 MG: 10 TABLET ORAL at 08:57

## 2022-01-14 RX ADMIN — ATORVASTATIN CALCIUM 80 MG: 40 TABLET, FILM COATED ORAL at 08:58

## 2022-01-14 RX ADMIN — FINASTERIDE 5 MG: 5 TABLET, FILM COATED ORAL at 08:58

## 2022-01-14 RX ADMIN — CEFAZOLIN SODIUM 2 G: 100 INJECTION, POWDER, LYOPHILIZED, FOR SOLUTION INTRAVENOUS at 06:13

## 2022-01-14 RX ADMIN — TAMSULOSIN HYDROCHLORIDE 0.4 MG: 0.4 CAPSULE ORAL at 08:58

## 2022-01-14 RX ADMIN — INSULIN HUMAN 8 UNITS: 100 INJECTION, SOLUTION PARENTERAL at 08:57

## 2022-01-14 RX ADMIN — CITALOPRAM HYDROBROMIDE 20 MG: 10 TABLET ORAL at 08:58

## 2022-01-14 NOTE — OP NOTES
[address] [facility]  OPERATIVE REPORT    Name:  Aria Carvajal  MR#:  [id number]  :  1950  ACCOUNT #:  [de-identified]  DATE OF SERVICE:  2022    PREOPERATIVE DIAGNOSIS:  Benign prostatic hypertrophy with lower urinary tract symptoms. POSTOPERATIVE DIAGNOSES:  Benign prostatic hypertrophy with lower urinary tract symptoms plus urethral stricture. PROCEDURES PERFORMED:  Cystoscopy, urethral dilation, bipolar transurethral resection of prostate. SURGEON:  Alyce Campuzano MD    ASSISTANT:  None. ANESTHESIA:  General.    COMPLICATIONS:  None. SPECIMENS REMOVED:  Prostate chips for path. IMPLANTS:  None. ESTIMATED BLOOD LOSS:  Less than 50 mL. DRAINS:  24 three-way Hinton catheter with 30 mL sterile water in balloon. FINDINGS:  See op note. INDICATIONS FOR OPERATIVE PROCEDURE:  The patient is a 67-year-old gentleman with a history of refractory BPH with LUTS, who had a UroLift back in 2020. He initially did well after the surgery but his symptoms soon returned and he opted to proceed with a TURP procedure today as office cystoscopy showed narrowing of his anterior channel concerning for bladder outlet obstruction from BPH. PROCEDURE:  After informed consent was obtained, the correct patient was identified in the preoperative holding area, he was taken back to the operating room suite and placed on the table in the supine position. A time out was performed confirming the correct patient and planned procedure. He received 2 g of IV Ancef prior to the smooth induction of general endotracheal anesthesia. He was moved into dorsal lithotomy position, prepped and draped in the usual sterile fashion. I began the case by attempting to insert a 26-Turkish rigid resectoscope using the visual obturator. Unfortunately, he had a urethral stricture near the bulbar urethra. I then removed my resectoscope and used Caio Arandage dilators.   I was able to dilate this up to 28-Georgian. I then reinserted the scope and was able to pass it, past the area of stricture into the patient's bladder. Pancystoscopy was performed. This showed lateral lobe hyperplasia of the prostate causing visual obstruction. I removed the visual obturator. I inserted my bipolar resectoscope loop. I then systematically resected the prostate circumferentially starting at the bladder neck and then backing out to the verumontanum distally. I took down the median lobe and then the right and left lateral lobes respectively. After this was all done, I cauterized all bleeders and inspected to achieve a wide open channel. I took care throughout the resection to avoid the ureteral orifices and had remained uninvolved in the orthotopic positions. The veru also remained intact. At the end of my resection, he had a wide open prostatic urethra with no visual obstruction. The UroLift implants were removed in the process of the resection and evacuated through the scope in the usual sterile fashion. After I achieved hemostasis and evacuated all the chips from the bladder, I removed the resectoscope. I passed a 24 three-way Hinton catheter. I filled the balloon with 30 mL of sterile water. I connected it to continuous bladder irrigation. The patient was awoken from anesthesia and transferred to the PACU in stable condition. He tolerated the procedure well. There were no complications. All counts were correct at the end of the procedure.       MD BRIA [id number]  D:  01/13/2022 12:31  T:  01/13/2022 19:03  JOB #:  [id number]

## 2022-01-14 NOTE — DISCHARGE SUMMARY
Discharge Summary     Patient: Zohreh Waller MRN: [id number]  SSN: [id number]    YOB: 1950  Age: 80 y.o. Sex: male      Allergies: Patient has no known allergies. Admit Date: 1/13/2022    Discharge Date: 1/14/2022     * Admission Diagnoses:  Hyperplasia of prostate with lower urinary tract symptoms (LUTS) [N40.1]  BPH (benign prostatic hyperplasia) [N40.0]     * Discharge Diagnoses:   Hospital Problems as of 1/14/2022 Date Reviewed: 11/19/2021            Codes Class Noted - Resolved POA    BPH (benign prostatic hyperplasia) ICD-10-CM: N40.0  ICD-9-CM: 600.00  1/13/2022 - Present Unknown                 * Procedures for this admission:   Procedure(s):  CYSTOCOPY, DILATION OF URETHERAL STRICTURE, BIPOLAR TRANSURETHRAL RESECTION OF PROSTATE      * Disposition: Home    * Discharged Condition: improved    * Hospital Course:         79 yo male with hx of BPH with LUTS plus urethral stricture and is s/p Cystoscopy, urethral dilation, bipolar transurethral resection of prostate on 1/13/21. Urine remains clear/yellow on slow CBI. Pt has ambulated today and had BM and urine remains clear. Hinton removed. Bleeding noted after removal.  Pt encouraged to drink plenty of fluids. We will watch UOP. Hopefully home later today once voiding consistently. He will RTO as scheduled with NP and MD.  D/C with abx x 5 days. Continue proscar.      Patient Active Problem List   Diagnosis Code    Coronary artery disease involving native coronary artery of native heart without angina pectoris I25.10    Type 2 diabetes mellitus without complication, without long-term current use of insulin (HCC) E11.9    Essential hypertension I10    Mixed hyperlipidemia E78.2    Benign non-nodular prostatic hyperplasia without lower urinary tract symptoms N40.0    Elevated PSA R97.20    Major depressive disorder with single episode, in full remission ([facility] F32.5    Stage 3a chronic kidney disease (HCC) N18.31    BPH (benign prostatic hyperplasia) N40.0         Discharge Medications:   Current Discharge Medication List        START taking these medications    Details   senna-docusate (PERICOLACE) 8.6-50 mg per tablet Take 1 Tablet by mouth two (2) times daily as needed for Constipation. Qty: 30 Tablet, Refills: 0  Start date: 1/14/2022      hyoscyamine (Levsin) 0.125 mg tablet Take 1 Tablet by mouth every four (4) hours as needed for PRN Reason (Other) (bladder spasms). Qty: 20 Tablet, Refills: 0  Start date: 1/14/2022      ciprofloxacin HCl (CIPRO) 500 mg tablet Take 1 Tablet by mouth two (2) times a day. Qty: 10 Tablet, Refills: 0  Start date: 1/14/2022      oxyCODONE-acetaminophen (PERCOCET) 5-325 mg per tablet Take 1 Tablet by mouth every four (4) hours as needed for Pain for up to 3 days. Max Daily Amount: 6 Tablets. Qty: 20 Tablet, Refills: 0  Start date: 1/14/2022, End date: 1/17/2022    Associated Diagnoses: S/P TURP           CONTINUE these medications which have NOT CHANGED    Details   finasteride (PROSCAR) 5 mg tablet Take 5 mg by mouth daily.       traZODone (DESYREL) 50 mg tablet Take 1 tablet by mouth nightly  Qty: 90 Tablet, Refills: 0    Associated Diagnoses: Major depressive disorder with single episode, in full remission ([facility])      tamsulosin (FLOMAX) 0.4 mg capsule Take 1 capsule by mouth once daily  Qty: 90 Capsule, Refills: 0    Associated Diagnoses: Benign non-nodular prostatic hyperplasia without lower urinary tract symptoms      metFORMIN (GLUCOPHAGE) 1,000 mg tablet TAKE 1 TABLET BY MOUTH TWICE DAILY WITH MEALS  Qty: 180 Tablet, Refills: 0    Associated Diagnoses: Type 2 diabetes mellitus without complication, without long-term current use of insulin ([facility])      citalopram (CELEXA) 20 mg tablet Take 1 tablet by mouth once daily  Qty: 90 Tablet, Refills: 0    Associated Diagnoses: Major depressive disorder with single episode, in full remission ([facility])      lisinopriL (PRINIVIL, ZESTRIL) 20 mg tablet Take 1 tablet by mouth once daily  Qty: 90 Tablet, Refills: 0    Associated Diagnoses: Coronary artery disease involving native coronary artery of native heart without angina pectoris; Type 2 diabetes mellitus without complication, without long-term current use of insulin ([facility] Essential hypertension      semaglutide (Rybelsus) 7 mg tablet Take 1 Tablet by mouth Daily (before breakfast). Qty: 90 Tablet, Refills: 0    Associated Diagnoses: Type 2 diabetes mellitus without complication, without long-term current use of insulin ([facility])      amLODIPine (NORVASC) 10 mg tablet Take 1 tablet by mouth once daily  Qty: 90 Tab, Refills: 1    Associated Diagnoses: Essential hypertension      fenofibrate (LOFIBRA) 160 mg tablet Take 1 tablet by mouth once daily  Qty: 90 Tab, Refills: 1    Associated Diagnoses: Mixed hyperlipidemia      atorvastatin (LIPITOR) 80 mg tablet Take 1 tablet by mouth nightly  Qty: 90 Tab, Refills: 1    Associated Diagnoses: Coronary artery disease involving native coronary artery of native heart without angina pectoris; Mixed hyperlipidemia      glucose blood VI test strips (OneTouch Ultra Blue Test Strip) strip USE  STRIP TO CHECK GLUCOSE ONCE DAILY Dx: E11.9  Qty: 100 Strip, Refills: 5    Associated Diagnoses: Type 2 diabetes mellitus without complication, without long-term current use of insulin ([facility])      sildenafiL, pulmonary hypertension, (REVATIO) 20 mg tablet Take 1-5 Tabs by mouth as needed (erectile dysfunction). Don't exceed 5 per dose. Qty: 50 Tab, Refills: 3      Blood-Glucose Meter misc 1 Kit by Does Not Apply route daily. Qty: 1 Each, Refills: 0    Comments: Test blood sugar daily. Dx:E11.9      lancets misc Test blood sugar daily. Dx:E11.9  Qty: 100 Each, Refills: 3      aspirin delayed-release 81 mg tablet Take 81 mg by mouth nightly. * Follow-up Care/Patient Instructions:   Activity:  No heavy lifting, pushing, pulling, avoid straining, avoid yard work, cutting grass, strenuous activity, etc., until re-evaluated at follow up appt  Avoid driving while taking pain medication. Recommend colace daily. Pt aware that he may have clear yellow urine and blood-tinged urine alternating for several weeks after surgery. Diet: Regular Diet  Wound Care: None needed    Follow-up Information       Follow up With Specialties Details Why Contact Info    Ehsan Wiseman MD Internal Medicine   [address]  [phone]                 I have reviewed the above note. I agree with the HPI, exam, assessment and plan as outlined by the nurse practitioner. Jairo W[facility]HESTER [facility], M.D.     [facility]  [address], [address]  Phone: [phone]  Fax: [phone]

## 2022-01-14 NOTE — PROGRESS NOTES
Hourly rounds complete this shift. Patient alert and oriented x4. No new complaints at this time. Bed in low, locked position, call light and bedside table within reach. Patient in bed resting on room air. Prescribed medications given. IV clean, dry, and intact. IV infusing. CBI infusing. Will continue to monitor. Report given to night shift nurse.

## 2022-01-14 NOTE — PROGRESS NOTES
Problem: Falls - Risk of  Goal: *Absence of Falls  Description: Document Oralia Villalta Fall Risk and appropriate interventions in the flowsheet. Outcome: Progressing Towards Goal  Note: Fall Risk Interventions:  Mobility Interventions: Bed/chair exit alarm,Patient to call before getting OOB         Medication Interventions: Patient to call before getting OOB                   Problem: Patient Education: Go to Patient Education Activity  Goal: Patient/Family Education  Outcome: Progressing Towards Goal     Problem: Diabetes Self-Management  Goal: *Disease process and treatment process  Description: Define diabetes and identify own type of diabetes; list 3 options for treating diabetes. Outcome: Progressing Towards Goal  Goal: *Incorporating nutritional management into lifestyle  Description: Describe effect of type, amount and timing of food on blood glucose; list 3 methods for planning meals. Outcome: Progressing Towards Goal  Goal: *Incorporating physical activity into lifestyle  Description: State effect of exercise on blood glucose levels. Outcome: Progressing Towards Goal  Goal: *Developing strategies to promote health/change behavior  Description: Define the ABC's of diabetes; identify appropriate screenings, schedule and personal plan for screenings. Outcome: Progressing Towards Goal  Goal: *Using medications safely  Description: State effect of diabetes medications on diabetes; name diabetes medication taking, action and side effects. Outcome: Progressing Towards Goal  Goal: *Monitoring blood glucose, interpreting and using results  Description: Identify recommended blood glucose targets  and personal targets. Outcome: Progressing Towards Goal  Goal: *Prevention, detection, treatment of acute complications  Description: List symptoms of hyper- and hypoglycemia; describe how to treat low blood sugar and actions for lowering  high blood glucose level.   Outcome: Progressing Towards Goal  Goal: *Prevention, detection and treatment of chronic complications  Description: Define the natural course of diabetes and describe the relationship of blood glucose levels to long term complications of diabetes.   Outcome: Progressing Towards Goal  Goal: *Developing strategies to address psychosocial issues  Description: Describe feelings about living with diabetes; identify support needed and support network  Outcome: Progressing Towards Goal  Goal: *Insulin pump training  Outcome: Progressing Towards Goal  Goal: *Sick day guidelines  Outcome: Progressing Towards Goal  Goal: *Patient Specific Goal (EDIT GOAL, INSERT TEXT)  Outcome: Progressing Towards Goal     Problem: Patient Education: Go to Patient Education Activity  Goal: Patient/Family Education  Outcome: Progressing Towards Goal

## 2022-01-14 NOTE — PROGRESS NOTES
Discharge information completed. Pt instructed where to  Rx, he denies any questions. Pt escort via ambulation, condition appear stable.

## 2022-01-14 NOTE — PROGRESS NOTES
Admit Date: 1/13/2022    Subjective:     Jeb Mendez is POD 1 TURP. Urine remains clear/yellow on slow CBI. Pt has ambulated today and had BM and urine remains clear. Objective:     Patient Vitals for the past 8 hrs:   BP Temp Pulse Resp SpO2   01/14/22 0810 (!) 145/75 98.6 °F (37 °C) 99 16 96 %   01/14/22 0254 (!) 140/73 98.8 °F (37.1 °C) 87 18 92 %     No intake/output data recorded. 01/12 1901 - 01/14 0700  In: 14876 [I.V.:1200]  Out: 67921 [Urine:21792]    Physical Exam:  GENERAL: alert, cooperative, no distress  LUNG: clear to auscultation bilaterally  HEART: regular rate and rhythm, S1, S2  ABDOMEN: soft, non-tender  NEUROLOGIC: AOx3    Data Review   Recent Results (from the past 24 hour(s))   GLUCOSE, POC    Collection Time: 01/13/22  4:14 PM   Result Value Ref Range    Glucose (POC) 157 (H) 65 - 100 mg/dL    Performed by Tiarra    GLUCOSE, POC    Collection Time: 01/13/22  8:36 PM   Result Value Ref Range    Glucose (POC) 183 (H) 65 - 100 mg/dL    Performed by Ervin    GLUCOSE, POC    Collection Time: 01/14/22  8:14 AM   Result Value Ref Range    Glucose (POC) 323 (H) 65 - 100 mg/dL    Performed by Martine        Assessment:     Active Problems:    BPH (benign prostatic hyperplasia) (1/13/2022)      POD 1:    POSTOPERATIVE DIAGNOSES:  Benign prostatic hypertrophy with lower urinary tract symptoms plus urethral stricture. PROCEDURES PERFORMED:  Cystoscopy, urethral dilation, bipolar transurethral resection of prostate. Afebrile, VSS    Plan: [place] removed. Bleeding noted after removal.  Pt encouraged to drink plenty of fluids. We will watch UOP. Hopefully home later today once voiding consistently. He will RTO as scheduled with NP and MD.      Luke Farfan NP  [facility] Urology    I have reviewed the above note. I agree with the HPI, exam, assessment and plan as outlined by the nurse practitioner. Jairo Nieto, JIMY Raza  [address] [place] [address], [address] [facility]  Phone: [phone]  Fax: [phone]

## 2022-01-14 NOTE — PROGRESS NOTES
CM has reviewed pt chart. Patient is currently observation. No needs are identified at this time. Will continue to follow pt plan of care and assist if needs arise. Care Management Interventions  PCP Verified by CM: Yes  Mode of Transport at Discharge:  Other (see comment) (family)  Transition of Care Consult (CM Consult): Discharge Planning  Discharge Durable Medical Equipment: No  Physical Therapy Consult: No  Occupational Therapy Consult: No  Speech Therapy Consult: No  Support Systems: Spouse/Significant Other  Confirm Follow Up Transport: Family  The Patient and/or Patient Representative was Provided with a Choice of Provider and Agrees with the Discharge Plan?: No  Freedom of Choice List was Provided with Basic Dialogue that Supports the Patient's Individualized Plan of Care/Goals, Treatment Preferences and Shares the Quality Data Associated with the Providers?: No  [place] Resource Information Provided?: No  Discharge Location  Discharge Placement: Home

## 2022-03-18 PROBLEM — N40.0 BPH (BENIGN PROSTATIC HYPERPLASIA): Status: ACTIVE | Noted: 2022-01-13

## 2022-03-20 PROBLEM — N18.31 STAGE 3A CHRONIC KIDNEY DISEASE (HCC): Status: ACTIVE | Noted: 2020-02-14

## 2022-05-25 ENCOUNTER — OFFICE VISIT (OUTPATIENT)
Dept: INTERNAL MEDICINE CLINIC | Facility: CLINIC | Age: 72
End: 2022-05-25
Payer: MEDICARE

## 2022-05-25 VITALS
HEIGHT: 72 IN | DIASTOLIC BLOOD PRESSURE: 71 MMHG | HEART RATE: 83 BPM | OXYGEN SATURATION: 96 % | BODY MASS INDEX: 26.55 KG/M2 | WEIGHT: 196 LBS | TEMPERATURE: 97.2 F | SYSTOLIC BLOOD PRESSURE: 126 MMHG

## 2022-05-25 DIAGNOSIS — F51.01 PRIMARY INSOMNIA: ICD-10-CM

## 2022-05-25 DIAGNOSIS — E11.22 TYPE 2 DIABETES MELLITUS WITH STAGE 3A CHRONIC KIDNEY DISEASE, WITHOUT LONG-TERM CURRENT USE OF INSULIN (HCC): ICD-10-CM

## 2022-05-25 DIAGNOSIS — N18.31 STAGE 3A CHRONIC KIDNEY DISEASE (HCC): ICD-10-CM

## 2022-05-25 DIAGNOSIS — N18.31 TYPE 2 DIABETES MELLITUS WITH STAGE 3A CHRONIC KIDNEY DISEASE, WITHOUT LONG-TERM CURRENT USE OF INSULIN (HCC): ICD-10-CM

## 2022-05-25 DIAGNOSIS — I25.10 CORONARY ARTERY DISEASE INVOLVING NATIVE CORONARY ARTERY OF NATIVE HEART WITHOUT ANGINA PECTORIS: Primary | ICD-10-CM

## 2022-05-25 DIAGNOSIS — E78.2 MIXED HYPERLIPIDEMIA: ICD-10-CM

## 2022-05-25 DIAGNOSIS — I10 ESSENTIAL HYPERTENSION: ICD-10-CM

## 2022-05-25 DIAGNOSIS — I25.10 CORONARY ARTERY DISEASE INVOLVING NATIVE CORONARY ARTERY OF NATIVE HEART WITHOUT ANGINA PECTORIS: ICD-10-CM

## 2022-05-25 DIAGNOSIS — F32.5 MAJOR DEPRESSIVE DISORDER WITH SINGLE EPISODE, IN FULL REMISSION (HCC): ICD-10-CM

## 2022-05-25 PROBLEM — N40.0 BPH (BENIGN PROSTATIC HYPERPLASIA): Status: RESOLVED | Noted: 2022-01-13 | Resolved: 2022-05-25

## 2022-05-25 PROCEDURE — 99214 OFFICE O/P EST MOD 30 MIN: CPT | Performed by: INTERNAL MEDICINE

## 2022-05-25 PROCEDURE — 4004F PT TOBACCO SCREEN RCVD TLK: CPT | Performed by: INTERNAL MEDICINE

## 2022-05-25 PROCEDURE — G8428 CUR MEDS NOT DOCUMENT: HCPCS | Performed by: INTERNAL MEDICINE

## 2022-05-25 PROCEDURE — G8419 CALC BMI OUT NRM PARAM NOF/U: HCPCS | Performed by: INTERNAL MEDICINE

## 2022-05-25 PROCEDURE — 3017F COLORECTAL CA SCREEN DOC REV: CPT | Performed by: INTERNAL MEDICINE

## 2022-05-25 PROCEDURE — 2022F DILAT RTA XM EVC RTNOPTHY: CPT | Performed by: INTERNAL MEDICINE

## 2022-05-25 PROCEDURE — 1123F ACP DISCUSS/DSCN MKR DOCD: CPT | Performed by: INTERNAL MEDICINE

## 2022-05-25 PROCEDURE — 3044F HG A1C LEVEL LT 7.0%: CPT | Performed by: INTERNAL MEDICINE

## 2022-05-25 RX ORDER — ATORVASTATIN CALCIUM 80 MG/1
80 TABLET, FILM COATED ORAL DAILY
Qty: 90 TABLET | Refills: 1 | Status: SHIPPED | OUTPATIENT
Start: 2022-05-25 | End: 2022-08-15

## 2022-05-25 RX ORDER — AMLODIPINE BESYLATE 10 MG/1
10 TABLET ORAL DAILY
Qty: 90 TABLET | Refills: 1 | Status: SHIPPED | OUTPATIENT
Start: 2022-05-25 | End: 2022-08-15

## 2022-05-25 RX ORDER — FENOFIBRATE 160 MG/1
160 TABLET ORAL DAILY
Qty: 90 TABLET | Refills: 1 | Status: SHIPPED | OUTPATIENT
Start: 2022-05-25

## 2022-05-25 RX ORDER — CITALOPRAM 20 MG/1
20 TABLET ORAL DAILY
Qty: 90 TABLET | Refills: 1 | Status: SHIPPED | OUTPATIENT
Start: 2022-05-25

## 2022-05-25 RX ORDER — LISINOPRIL 20 MG/1
20 TABLET ORAL DAILY
Qty: 90 TABLET | Refills: 1 | Status: SHIPPED | OUTPATIENT
Start: 2022-05-25

## 2022-05-25 RX ORDER — TRAZODONE HYDROCHLORIDE 50 MG/1
50 TABLET ORAL NIGHTLY
Qty: 90 TABLET | Refills: 1 | Status: SHIPPED | OUTPATIENT
Start: 2022-05-25 | End: 2022-08-30 | Stop reason: SDUPTHER

## 2022-05-25 ASSESSMENT — ENCOUNTER SYMPTOMS
SHORTNESS OF BREATH: 0
BLOOD IN STOOL: 0

## 2022-05-25 ASSESSMENT — PATIENT HEALTH QUESTIONNAIRE - PHQ9
SUM OF ALL RESPONSES TO PHQ QUESTIONS 1-9: 0
SUM OF ALL RESPONSES TO PHQ9 QUESTIONS 1 & 2: 0
SUM OF ALL RESPONSES TO PHQ QUESTIONS 1-9: 0
2. FEELING DOWN, DEPRESSED OR HOPELESS: 0
1. LITTLE INTEREST OR PLEASURE IN DOING THINGS: 0
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
## 2022-05-25 NOTE — PATIENT INSTRUCTIONS
Recommend colon cancer screening to lower your risk for potentially preventable morbidity/mortality from colon cancer.

## 2022-05-26 LAB
ALBUMIN SERPL-MCNC: 3.8 G/DL (ref 3.2–4.6)
ALBUMIN/GLOB SERPL: 1.4 {RATIO} (ref 1.2–3.5)
ALP SERPL-CCNC: 66 U/L (ref 50–136)
ALT SERPL-CCNC: 32 U/L (ref 12–65)
ANION GAP SERPL CALC-SCNC: 5 MMOL/L (ref 7–16)
AST SERPL-CCNC: 18 U/L (ref 15–37)
BASOPHILS # BLD: 0.1 K/UL (ref 0–0.2)
BASOPHILS NFR BLD: 1 % (ref 0–2)
BILIRUB DIRECT SERPL-MCNC: <0.1 MG/DL
BILIRUB SERPL-MCNC: 0.1 MG/DL (ref 0.2–1.1)
BUN SERPL-MCNC: 27 MG/DL (ref 8–23)
CALCIUM SERPL-MCNC: 10.7 MG/DL (ref 8.3–10.4)
CHLORIDE SERPL-SCNC: 107 MMOL/L (ref 98–107)
CO2 SERPL-SCNC: 28 MMOL/L (ref 21–32)
CREAT SERPL-MCNC: 1.3 MG/DL (ref 0.8–1.5)
DIFFERENTIAL METHOD BLD: NORMAL
EOSINOPHIL # BLD: 0.3 K/UL (ref 0–0.8)
EOSINOPHIL NFR BLD: 3 % (ref 0.5–7.8)
ERYTHROCYTE [DISTWIDTH] IN BLOOD BY AUTOMATED COUNT: 13.2 % (ref 11.9–14.6)
EST. AVERAGE GLUCOSE BLD GHB EST-MCNC: 163 MG/DL
GLOBULIN SER CALC-MCNC: 2.8 G/DL (ref 2.3–3.5)
GLUCOSE SERPL-MCNC: 122 MG/DL (ref 65–100)
HBA1C MFR BLD: 7.3 % (ref 4.2–6.3)
HCT VFR BLD AUTO: 41.2 % (ref 41.1–50.3)
HGB BLD-MCNC: 13.9 G/DL (ref 13.6–17.2)
IMM GRANULOCYTES # BLD AUTO: 0.1 K/UL (ref 0–0.5)
IMM GRANULOCYTES NFR BLD AUTO: 1 % (ref 0–5)
LYMPHOCYTES # BLD: 2.9 K/UL (ref 0.5–4.6)
LYMPHOCYTES NFR BLD: 26 % (ref 13–44)
MCH RBC QN AUTO: 31.1 PG (ref 26.1–32.9)
MCHC RBC AUTO-ENTMCNC: 33.7 G/DL (ref 31.4–35)
MCV RBC AUTO: 92.2 FL (ref 79.6–97.8)
MONOCYTES # BLD: 0.6 K/UL (ref 0.1–1.3)
MONOCYTES NFR BLD: 6 % (ref 4–12)
NEUTS SEG # BLD: 6.9 K/UL (ref 1.7–8.2)
NEUTS SEG NFR BLD: 63 % (ref 43–78)
NRBC # BLD: 0 K/UL (ref 0–0.2)
PLATELET # BLD AUTO: 259 K/UL (ref 150–450)
PMV BLD AUTO: 10.2 FL (ref 9.4–12.3)
POTASSIUM SERPL-SCNC: 4.6 MMOL/L (ref 3.5–5.1)
PROT SERPL-MCNC: 6.6 G/DL (ref 6.3–8.2)
RBC # BLD AUTO: 4.47 M/UL (ref 4.23–5.6)
SODIUM SERPL-SCNC: 140 MMOL/L (ref 138–145)
WBC # BLD AUTO: 10.9 K/UL (ref 4.3–11.1)

## 2022-08-10 DIAGNOSIS — E78.2 MIXED HYPERLIPIDEMIA: ICD-10-CM

## 2022-08-10 DIAGNOSIS — I25.10 CORONARY ARTERY DISEASE INVOLVING NATIVE CORONARY ARTERY OF NATIVE HEART WITHOUT ANGINA PECTORIS: ICD-10-CM

## 2022-08-10 DIAGNOSIS — I10 ESSENTIAL HYPERTENSION: ICD-10-CM

## 2022-08-10 RX ORDER — FENOFIBRATE 160 MG/1
TABLET ORAL
Qty: 90 TABLET | Refills: 0 | OUTPATIENT
Start: 2022-08-10

## 2022-08-10 RX ORDER — BLOOD SUGAR DIAGNOSTIC
STRIP MISCELLANEOUS
Qty: 100 EACH | Refills: 0 | OUTPATIENT
Start: 2022-08-10

## 2022-08-10 RX ORDER — ATORVASTATIN CALCIUM 80 MG/1
TABLET, FILM COATED ORAL
Qty: 90 TABLET | Refills: 0 | OUTPATIENT
Start: 2022-08-10

## 2022-08-10 RX ORDER — AMLODIPINE BESYLATE 10 MG/1
TABLET ORAL
Qty: 90 TABLET | Refills: 0 | OUTPATIENT
Start: 2022-08-10

## 2022-08-13 DIAGNOSIS — I10 ESSENTIAL HYPERTENSION: ICD-10-CM

## 2022-08-13 DIAGNOSIS — I25.10 CORONARY ARTERY DISEASE INVOLVING NATIVE CORONARY ARTERY OF NATIVE HEART WITHOUT ANGINA PECTORIS: ICD-10-CM

## 2022-08-13 DIAGNOSIS — E78.2 MIXED HYPERLIPIDEMIA: ICD-10-CM

## 2022-08-15 RX ORDER — BLOOD SUGAR DIAGNOSTIC
STRIP MISCELLANEOUS
Qty: 100 EACH | Refills: 0 | Status: SHIPPED | OUTPATIENT
Start: 2022-08-15 | End: 2022-08-21 | Stop reason: SDUPTHER

## 2022-08-15 RX ORDER — ATORVASTATIN CALCIUM 80 MG/1
TABLET, FILM COATED ORAL
Qty: 30 TABLET | Refills: 0 | Status: SHIPPED | OUTPATIENT
Start: 2022-08-15 | End: 2022-08-30 | Stop reason: SDUPTHER

## 2022-08-15 RX ORDER — AMLODIPINE BESYLATE 10 MG/1
TABLET ORAL
Qty: 30 TABLET | Refills: 0 | Status: SHIPPED | OUTPATIENT
Start: 2022-08-15 | End: 2022-08-30 | Stop reason: SDUPTHER

## 2022-08-21 DIAGNOSIS — N18.31 TYPE 2 DIABETES MELLITUS WITH STAGE 3A CHRONIC KIDNEY DISEASE, WITHOUT LONG-TERM CURRENT USE OF INSULIN (HCC): Primary | ICD-10-CM

## 2022-08-21 DIAGNOSIS — E11.22 TYPE 2 DIABETES MELLITUS WITH STAGE 3A CHRONIC KIDNEY DISEASE, WITHOUT LONG-TERM CURRENT USE OF INSULIN (HCC): Primary | ICD-10-CM

## 2022-08-22 RX ORDER — BLOOD SUGAR DIAGNOSTIC
STRIP MISCELLANEOUS
Qty: 100 EACH | Refills: 0 | Status: SHIPPED | OUTPATIENT
Start: 2022-08-22

## 2022-08-30 ENCOUNTER — OFFICE VISIT (OUTPATIENT)
Dept: INTERNAL MEDICINE CLINIC | Facility: CLINIC | Age: 72
End: 2022-08-30
Payer: MEDICARE

## 2022-08-30 VITALS
HEIGHT: 72 IN | DIASTOLIC BLOOD PRESSURE: 68 MMHG | BODY MASS INDEX: 26.95 KG/M2 | SYSTOLIC BLOOD PRESSURE: 126 MMHG | WEIGHT: 199 LBS | OXYGEN SATURATION: 98 % | TEMPERATURE: 97.3 F | HEART RATE: 85 BPM

## 2022-08-30 DIAGNOSIS — N18.31 STAGE 3A CHRONIC KIDNEY DISEASE (HCC): ICD-10-CM

## 2022-08-30 DIAGNOSIS — E11.22 TYPE 2 DIABETES MELLITUS WITH STAGE 3A CHRONIC KIDNEY DISEASE, WITHOUT LONG-TERM CURRENT USE OF INSULIN (HCC): ICD-10-CM

## 2022-08-30 DIAGNOSIS — F51.01 PRIMARY INSOMNIA: ICD-10-CM

## 2022-08-30 DIAGNOSIS — I25.10 CORONARY ARTERY DISEASE INVOLVING NATIVE CORONARY ARTERY OF NATIVE HEART WITHOUT ANGINA PECTORIS: Primary | ICD-10-CM

## 2022-08-30 DIAGNOSIS — I10 ESSENTIAL HYPERTENSION: ICD-10-CM

## 2022-08-30 DIAGNOSIS — E78.2 MIXED HYPERLIPIDEMIA: ICD-10-CM

## 2022-08-30 DIAGNOSIS — N18.31 TYPE 2 DIABETES MELLITUS WITH STAGE 3A CHRONIC KIDNEY DISEASE, WITHOUT LONG-TERM CURRENT USE OF INSULIN (HCC): ICD-10-CM

## 2022-08-30 DIAGNOSIS — F32.5 MAJOR DEPRESSIVE DISORDER WITH SINGLE EPISODE, IN FULL REMISSION (HCC): ICD-10-CM

## 2022-08-30 PROCEDURE — G8427 DOCREV CUR MEDS BY ELIG CLIN: HCPCS | Performed by: INTERNAL MEDICINE

## 2022-08-30 PROCEDURE — 1123F ACP DISCUSS/DSCN MKR DOCD: CPT | Performed by: INTERNAL MEDICINE

## 2022-08-30 PROCEDURE — G8417 CALC BMI ABV UP PARAM F/U: HCPCS | Performed by: INTERNAL MEDICINE

## 2022-08-30 PROCEDURE — 3051F HG A1C>EQUAL 7.0%<8.0%: CPT | Performed by: INTERNAL MEDICINE

## 2022-08-30 PROCEDURE — 3017F COLORECTAL CA SCREEN DOC REV: CPT | Performed by: INTERNAL MEDICINE

## 2022-08-30 PROCEDURE — 99214 OFFICE O/P EST MOD 30 MIN: CPT | Performed by: INTERNAL MEDICINE

## 2022-08-30 PROCEDURE — 2022F DILAT RTA XM EVC RTNOPTHY: CPT | Performed by: INTERNAL MEDICINE

## 2022-08-30 PROCEDURE — 4004F PT TOBACCO SCREEN RCVD TLK: CPT | Performed by: INTERNAL MEDICINE

## 2022-08-30 RX ORDER — ATORVASTATIN CALCIUM 80 MG/1
TABLET, FILM COATED ORAL
Qty: 90 TABLET | Refills: 0 | Status: SHIPPED | OUTPATIENT
Start: 2022-08-30

## 2022-08-30 RX ORDER — TRAZODONE HYDROCHLORIDE 50 MG/1
50 TABLET ORAL NIGHTLY
Qty: 90 TABLET | Refills: 0 | Status: SHIPPED | OUTPATIENT
Start: 2022-08-30

## 2022-08-30 RX ORDER — AMLODIPINE BESYLATE 10 MG/1
TABLET ORAL
Qty: 90 TABLET | Refills: 0 | Status: SHIPPED | OUTPATIENT
Start: 2022-08-30

## 2022-08-30 ASSESSMENT — PATIENT HEALTH QUESTIONNAIRE - PHQ9
10. IF YOU CHECKED OFF ANY PROBLEMS, HOW DIFFICULT HAVE THESE PROBLEMS MADE IT FOR YOU TO DO YOUR WORK, TAKE CARE OF THINGS AT HOME, OR GET ALONG WITH OTHER PEOPLE: 0
9. THOUGHTS THAT YOU WOULD BE BETTER OFF DEAD, OR OF HURTING YOURSELF: 0
4. FEELING TIRED OR HAVING LITTLE ENERGY: 0
SUM OF ALL RESPONSES TO PHQ QUESTIONS 1-9: 0
3. TROUBLE FALLING OR STAYING ASLEEP: 0
SUM OF ALL RESPONSES TO PHQ QUESTIONS 1-9: 0
SUM OF ALL RESPONSES TO PHQ QUESTIONS 1-9: 0
6. FEELING BAD ABOUT YOURSELF - OR THAT YOU ARE A FAILURE OR HAVE LET YOURSELF OR YOUR FAMILY DOWN: 0
8. MOVING OR SPEAKING SO SLOWLY THAT OTHER PEOPLE COULD HAVE NOTICED. OR THE OPPOSITE, BEING SO FIGETY OR RESTLESS THAT YOU HAVE BEEN MOVING AROUND A LOT MORE THAN USUAL: 0
2. FEELING DOWN, DEPRESSED OR HOPELESS: 0
1. LITTLE INTEREST OR PLEASURE IN DOING THINGS: 0
SUM OF ALL RESPONSES TO PHQ QUESTIONS 1-9: 0
5. POOR APPETITE OR OVEREATING: 0
7. TROUBLE CONCENTRATING ON THINGS, SUCH AS READING THE NEWSPAPER OR WATCHING TELEVISION: 0
SUM OF ALL RESPONSES TO PHQ9 QUESTIONS 1 & 2: 0

## 2022-08-30 ASSESSMENT — ANXIETY QUESTIONNAIRES
5. BEING SO RESTLESS THAT IT IS HARD TO SIT STILL: 0
1. FEELING NERVOUS, ANXIOUS, OR ON EDGE: 0
7. FEELING AFRAID AS IF SOMETHING AWFUL MIGHT HAPPEN: 0
6. BECOMING EASILY ANNOYED OR IRRITABLE: 0
2. NOT BEING ABLE TO STOP OR CONTROL WORRYING: 0
4. TROUBLE RELAXING: 0
IF YOU CHECKED OFF ANY PROBLEMS ON THIS QUESTIONNAIRE, HOW DIFFICULT HAVE THESE PROBLEMS MADE IT FOR YOU TO DO YOUR WORK, TAKE CARE OF THINGS AT HOME, OR GET ALONG WITH OTHER PEOPLE: NOT DIFFICULT AT ALL
3. WORRYING TOO MUCH ABOUT DIFFERENT THINGS: 0
GAD7 TOTAL SCORE: 0

## 2022-08-30 ASSESSMENT — ENCOUNTER SYMPTOMS
SHORTNESS OF BREATH: 0
BLOOD IN STOOL: 0
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
## 2022-08-30 NOTE — PATIENT INSTRUCTIONS
Recommend tobacco cessation. Recommend colon cancer screening to lower your risk for potentially preventable morbidity/mortality from colon cancer.

## 2022-11-10 ASSESSMENT — ENCOUNTER SYMPTOMS
ABDOMINAL PAIN: 0
BACK PAIN: 0
COUGH: 0
EYE PAIN: 0
HEARTBURN: 0
CONSTIPATION: 0
SKIN LESIONS: 0
WHEEZING: 0
BLOOD IN STOOL: 0
EYE DISCHARGE: 0
DIARRHEA: 0
INDIGESTION: 0
VOMITING: 0
NAUSEA: 0
SHORTNESS OF BREATH: 0
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
## 2022-11-11 ENCOUNTER — OFFICE VISIT (OUTPATIENT)
Dept: UROLOGY | Age: 72
End: 2022-11-11
Payer: MEDICARE

## 2022-11-11 DIAGNOSIS — N52.01 ERECTILE DYSFUNCTION DUE TO ARTERIAL INSUFFICIENCY: ICD-10-CM

## 2022-11-11 DIAGNOSIS — N40.1 BENIGN PROSTATIC HYPERPLASIA WITH URINARY FREQUENCY: Primary | ICD-10-CM

## 2022-11-11 DIAGNOSIS — R97.20 ELEVATED PSA: ICD-10-CM

## 2022-11-11 DIAGNOSIS — R35.0 BENIGN PROSTATIC HYPERPLASIA WITH URINARY FREQUENCY: Primary | ICD-10-CM

## 2022-11-11 LAB
BILIRUBIN, URINE, POC: NEGATIVE
BLOOD URINE, POC: NEGATIVE
GLUCOSE URINE, POC: 100
KETONES, URINE, POC: NEGATIVE
LEUKOCYTE ESTERASE, URINE, POC: NEGATIVE
NITRITE, URINE, POC: NEGATIVE
PH, URINE, POC: 7 (ref 4.6–8)
PROTEIN,URINE, POC: NEGATIVE
PVR, POC: 3 CC
SPECIFIC GRAVITY, URINE, POC: 1.02 (ref 1–1.03)
URINALYSIS CLARITY, POC: NORMAL
URINALYSIS COLOR, POC: NORMAL
UROBILINOGEN, POC: NORMAL

## 2022-11-11 PROCEDURE — 4004F PT TOBACCO SCREEN RCVD TLK: CPT | Performed by: UROLOGY

## 2022-11-11 PROCEDURE — G8484 FLU IMMUNIZE NO ADMIN: HCPCS | Performed by: UROLOGY

## 2022-11-11 PROCEDURE — 51798 US URINE CAPACITY MEASURE: CPT | Performed by: UROLOGY

## 2022-11-11 PROCEDURE — G8427 DOCREV CUR MEDS BY ELIG CLIN: HCPCS | Performed by: UROLOGY

## 2022-11-11 PROCEDURE — 81003 URINALYSIS AUTO W/O SCOPE: CPT | Performed by: UROLOGY

## 2022-11-11 PROCEDURE — 3017F COLORECTAL CA SCREEN DOC REV: CPT | Performed by: UROLOGY

## 2022-11-11 PROCEDURE — G8417 CALC BMI ABV UP PARAM F/U: HCPCS | Performed by: UROLOGY

## 2022-11-11 PROCEDURE — 1123F ACP DISCUSS/DSCN MKR DOCD: CPT | Performed by: UROLOGY

## 2022-11-11 PROCEDURE — 99213 OFFICE O/P EST LOW 20 MIN: CPT | Performed by: UROLOGY

## 2022-11-15 ENCOUNTER — OFFICE VISIT (OUTPATIENT)
Dept: PRIMARY CARE CLINIC | Facility: CLINIC | Age: 72
End: 2022-11-15

## 2022-11-15 VITALS
BODY MASS INDEX: 27.21 KG/M2 | HEIGHT: 72 IN | HEART RATE: 63 BPM | WEIGHT: 200.9 LBS | TEMPERATURE: 98.4 F | OXYGEN SATURATION: 99 % | SYSTOLIC BLOOD PRESSURE: 132 MMHG | DIASTOLIC BLOOD PRESSURE: 81 MMHG

## 2022-11-15 DIAGNOSIS — E78.2 MIXED HYPERLIPIDEMIA: ICD-10-CM

## 2022-11-15 DIAGNOSIS — R97.20 ELEVATED PSA: ICD-10-CM

## 2022-11-15 DIAGNOSIS — N18.31 TYPE 2 DIABETES MELLITUS WITH STAGE 3A CHRONIC KIDNEY DISEASE, WITHOUT LONG-TERM CURRENT USE OF INSULIN (HCC): ICD-10-CM

## 2022-11-15 DIAGNOSIS — E55.9 VITAMIN D DEFICIENCY: ICD-10-CM

## 2022-11-15 DIAGNOSIS — N18.31 STAGE 3A CHRONIC KIDNEY DISEASE (HCC): ICD-10-CM

## 2022-11-15 DIAGNOSIS — Z79.899 ENCOUNTER FOR LONG-TERM (CURRENT) USE OF MEDICATIONS: ICD-10-CM

## 2022-11-15 DIAGNOSIS — Z13.220 SCREENING FOR LIPID DISORDERS: ICD-10-CM

## 2022-11-15 DIAGNOSIS — Z86.69 HISTORY OF CATARACT: ICD-10-CM

## 2022-11-15 DIAGNOSIS — I10 ESSENTIAL HYPERTENSION: ICD-10-CM

## 2022-11-15 DIAGNOSIS — I25.10 CORONARY ARTERY DISEASE INVOLVING NATIVE CORONARY ARTERY OF NATIVE HEART WITHOUT ANGINA PECTORIS: ICD-10-CM

## 2022-11-15 DIAGNOSIS — Z76.89 ENCOUNTER TO ESTABLISH CARE: Primary | ICD-10-CM

## 2022-11-15 DIAGNOSIS — E11.22 TYPE 2 DIABETES MELLITUS WITH STAGE 3A CHRONIC KIDNEY DISEASE, WITHOUT LONG-TERM CURRENT USE OF INSULIN (HCC): ICD-10-CM

## 2022-11-15 DIAGNOSIS — I25.2 HISTORY OF MI (MYOCARDIAL INFARCTION): ICD-10-CM

## 2022-11-15 RX ORDER — AMLODIPINE BESYLATE 10 MG/1
TABLET ORAL
Qty: 90 TABLET | Refills: 0 | Status: SHIPPED | OUTPATIENT
Start: 2022-11-15

## 2022-11-15 RX ORDER — ATORVASTATIN CALCIUM 80 MG/1
TABLET, FILM COATED ORAL
Qty: 90 TABLET | Refills: 0 | Status: SHIPPED | OUTPATIENT
Start: 2022-11-15

## 2022-11-15 RX ORDER — LISINOPRIL 20 MG/1
20 TABLET ORAL DAILY
Qty: 90 TABLET | Refills: 1 | Status: SHIPPED | OUTPATIENT
Start: 2022-11-15

## 2022-11-15 SDOH — ECONOMIC STABILITY: FOOD INSECURITY: WITHIN THE PAST 12 MONTHS, YOU WORRIED THAT YOUR FOOD WOULD RUN OUT BEFORE YOU GOT MONEY TO BUY MORE.: PATIENT DECLINED

## 2022-11-15 SDOH — ECONOMIC STABILITY: FOOD INSECURITY: WITHIN THE PAST 12 MONTHS, THE FOOD YOU BOUGHT JUST DIDN'T LAST AND YOU DIDN'T HAVE MONEY TO GET MORE.: PATIENT DECLINED

## 2022-11-15 ASSESSMENT — PATIENT HEALTH QUESTIONNAIRE - PHQ9
SUM OF ALL RESPONSES TO PHQ QUESTIONS 1-9: 0
SUM OF ALL RESPONSES TO PHQ QUESTIONS 1-9: 0
1. LITTLE INTEREST OR PLEASURE IN DOING THINGS: 0
SUM OF ALL RESPONSES TO PHQ QUESTIONS 1-9: 0
SUM OF ALL RESPONSES TO PHQ9 QUESTIONS 1 & 2: 0
2. FEELING DOWN, DEPRESSED OR HOPELESS: 0
SUM OF ALL RESPONSES TO PHQ QUESTIONS 1-9: 0

## 2022-11-15 ASSESSMENT — SOCIAL DETERMINANTS OF HEALTH (SDOH): HOW HARD IS IT FOR YOU TO PAY FOR THE VERY BASICS LIKE FOOD, HOUSING, MEDICAL CARE, AND HEATING?: PATIENT DECLINED

## 2022-11-15 NOTE — PROGRESS NOTES
Kael Ortiz M.D.  [address] [facility]  Ricardo Dickens  Phone:  [phone]  Fax:  [phone]:  Chief Complaint   Patient presents with    New Patient     Here to establish care, history of diabetes, htn, bph, MI in 1999, CAD and arthritis. Other     Wife thinks patient needs a sleep study done because she does not feel like is resting but patient thinks it is more related to his prostate. HISTORY OF PRESENT ILLNESS:  Mr. Meg De La Paz is a 71 y.o. male  who presents as a new patient. He has a history of diabetes. His blood sugars usually range between 150 and 165. He takes metformin daily as prescribed. He has a history of cataracts. He has been seen the ophthalmologist at [address] [place] [address]. He also has a history of coronary artery disease and chronic kidney disease stage III. This is stable. He had a heart attack at age 40 and had stents placed. He has been doing well ever since. His blood pressure has been under control. Today it is 132/81. He denies any chest pain, shortness of breath, headaches, or blurred vision. He also has a history of prostate cancer and had prostate surgery 3 years ago. He has had surgery x2. He gets about 6 or 7 hours of sleep at night. He continues to follow-up with [facility] urology. His last PSA was 7.2. No other complaints. Taking medications as prescribed. Medications reviewed. HISTORY:  No Known Allergies      REVIEW OF SYSTEMS:  Review of systems is as indicated in HPI otherwise negative. PHYSICAL EXAM:  Vital Signs -   Visit Vitals  /81   Pulse 63   Temp 98.4 °F (36.9 °C) (Temporal)   Ht 6' (1.829 m)   Wt 200 lb 14.4 oz (91.1 kg)   SpO2 99%   BMI 27.25 kg/m²        Physical Exam  Vitals and nursing note reviewed. Constitutional:       Appearance: Normal appearance. HENT:      Head: Normocephalic and atraumatic.       Nose: Nose normal.      Mouth/Throat:      Mouth: Mucous membranes are moist.   Eyes:      Extraocular Movements: Extraocular movements intact. Pupils: Pupils are equal, round, and reactive to light. Cardiovascular:      Rate and Rhythm: Normal rate and regular rhythm. Pulses: Normal pulses. Heart sounds: Normal heart sounds. Pulmonary:      Effort: Pulmonary effort is normal.      Breath sounds: Normal breath sounds. Abdominal:      General: Abdomen is flat. Bowel sounds are normal.      Palpations: Abdomen is soft. Musculoskeletal:         General: Normal range of motion. Cervical back: Normal range of motion and neck supple. Skin:     General: Skin is warm and dry. Neurological:      General: No focal deficit present. Mental Status: He is alert and oriented to person, place, and time. Psychiatric:         Mood and Affect: Mood normal.         Behavior: Behavior normal.         PHQ:  PHQ-9  11/15/2022   Little interest or pleasure in doing things 0   Little interest or pleasure in doing things -   Feeling down, depressed, or hopeless 0   Trouble falling or staying asleep, or sleeping too much -   Trouble falling or staying asleep, or sleeping too much -   Feeling tired or having little energy -   Feeling tired or having little energy -   Poor appetite or overeating -   Poor appetite, weight loss, or overeating -   Feeling bad about yourself - or that you are a failure or have let yourself or your family down -   Feeling bad about yourself - or that you are a failure or have let yourself or your family down -   Trouble concentrating on things, such as reading the newspaper or watching television -   Trouble concentrating on things such as school, work, reading, or watching TV -   Moving or speaking so slowly that other people could have noticed.  Or the opposite - being so fidgety or restless that you have been moving around a lot more than usual -   Moving or speaking so slowly that other people could have noticed; or the opposite being so fidgety that others notice -   Thoughts that you would be better off dead, or of hurting yourself in some way -   Thoughts of being better off dead, or hurting yourself in some way -   PHQ-2 Score 0   Total Score PHQ 2 -   PHQ-9 Total Score 0   PHQ 9 Score -   If you checked off any problems, how difficult have these problems made it for you to do your work, take care of things at home, or get along with other people? -   How difficult have these problems made it for you to do your work, take care of your home and get along with others -       LABS  No results found for this visit on 11/15/22. Office Visit on 11/11/2022   Component Date Value Ref Range Status    Glucose, Urine, POC 11/11/2022 100  Negative Final    Bilirubin, Urine, POC 11/11/2022 Negative  Negative Final    KETONES, Urine, POC 11/11/2022 Negative  Negative Final    Specific Gravity, Urine, POC 11/11/2022 1.020  1.001 - 1.035 Final    Blood (UA POC) 11/11/2022 Negative  Negative Final    pH, Urine, POC 11/11/2022 7.0  4.6 - 8.0 Final    Protein, Urine, POC 11/11/2022 Negative  Negative Final    Urobilinogen, POC 11/11/2022 0.2 mg/dL   Final    Nitrite, Urine, POC 11/11/2022 Negative  Negative Final    Leukocyte Esterase, Urine, POC 11/11/2022 Negative  Negative Final    PVR, POC 11/11/2022 3  cc Final       IMPRESSION/PLAN     Diagnosis Orders   1. Encounter to establish care        2. Type 2 diabetes mellitus with stage 3a chronic kidney disease, without long-term current use of insulin (HCC)  lisinopril (PRINIVIL;ZESTRIL) 20 MG tablet    CBC with Auto Differential    Comprehensive Metabolic Panel    Hemoglobin A1C      3. Coronary artery disease involving native coronary artery of native heart without angina pectoris  atorvastatin (LIPITOR) 80 MG tablet    lisinopril (PRINIVIL;ZESTRIL) 20 MG tablet      4. Stage 3a chronic kidney disease (HCC)  lisinopril (PRINIVIL;ZESTRIL) 20 MG tablet      5.  Mixed hyperlipidemia  atorvastatin (LIPITOR) 80 MG tablet      6. Essential hypertension  amLODIPine (NORVASC) 10 MG tablet    lisinopril (PRINIVIL;ZESTRIL) 20 MG tablet    [facility]      7. Vitamin D deficiency  Vitamin D 25 Hydroxy      8. Elevated PSA      being followed by urologist      9. History of cataract        10. History of MI (myocardial infarction)  [address] [facility]      11. Screening for lipid disorders  Lipid Panel      12. Encounter for long-term (current) use of medications            Follow up and Dispositions:  Return in about 4 months (around 3/15/2023). Patient will continue current medications. Refilled the above medications. Reviewed medications and side effects in detail. Will check the above labs. Reviewed most recent labs. Reviewed diet, exercise and weight control. Cardiovascular risks and recommendations reviewed. Patient encouraged to follow a diabetic/low sodium diet. Patient will check blood sugars once daily. Will refer to [facility]. Joleen Blue MD    Dictated using voice recognition software.  Proofread, but unrecognized voice recognition errors may exist.

## 2022-11-16 LAB
25(OH)D3 SERPL-MCNC: 30.7 NG/ML (ref 30–100)
ALBUMIN SERPL-MCNC: 4.1 G/DL (ref 3.2–4.6)
ALBUMIN/GLOB SERPL: 1.5 {RATIO} (ref 0.4–1.6)
ALP SERPL-CCNC: 80 U/L (ref 50–136)
ALT SERPL-CCNC: 36 U/L (ref 12–65)
ANION GAP SERPL CALC-SCNC: 3 MMOL/L (ref 2–11)
AST SERPL-CCNC: 25 U/L (ref 15–37)
BASOPHILS # BLD: 0.1 K/UL (ref 0–0.2)
BASOPHILS NFR BLD: 1 % (ref 0–2)
BILIRUB SERPL-MCNC: 0.3 MG/DL (ref 0.2–1.1)
BUN SERPL-MCNC: 20 MG/DL (ref 8–23)
CALCIUM SERPL-MCNC: 10.3 MG/DL (ref 8.3–10.4)
CHLORIDE SERPL-SCNC: 109 MMOL/L (ref 101–110)
CHOLEST SERPL-MCNC: 155 MG/DL
CO2 SERPL-SCNC: 26 MMOL/L (ref 21–32)
CREAT SERPL-MCNC: 1.1 MG/DL (ref 0.8–1.5)
DIFFERENTIAL METHOD BLD: NORMAL
EOSINOPHIL # BLD: 0.3 K/UL (ref 0–0.8)
EOSINOPHIL NFR BLD: 3 % (ref 0.5–7.8)
ERYTHROCYTE [DISTWIDTH] IN BLOOD BY AUTOMATED COUNT: 13.6 % (ref 11.9–14.6)
EST. AVERAGE GLUCOSE BLD GHB EST-MCNC: 157 MG/DL
GLOBULIN SER CALC-MCNC: 2.7 G/DL (ref 2.8–4.5)
GLUCOSE SERPL-MCNC: 168 MG/DL (ref 65–100)
HBA1C MFR BLD: 7.1 % (ref 4.8–5.6)
HCT VFR BLD AUTO: 42.3 % (ref 41.1–50.3)
HDLC SERPL-MCNC: 43 MG/DL (ref 40–60)
HDLC SERPL: 3.6 {RATIO}
HGB BLD-MCNC: 14 G/DL (ref 13.6–17.2)
IMM GRANULOCYTES # BLD AUTO: 0 K/UL (ref 0–0.5)
IMM GRANULOCYTES NFR BLD AUTO: 0 % (ref 0–5)
LDLC SERPL CALC-MCNC: 80 MG/DL
LYMPHOCYTES # BLD: 2.5 K/UL (ref 0.5–4.6)
LYMPHOCYTES NFR BLD: 27 % (ref 13–44)
MCH RBC QN AUTO: 32.2 PG (ref 26.1–32.9)
MCHC RBC AUTO-ENTMCNC: 33.1 G/DL (ref 31.4–35)
MCV RBC AUTO: 97.2 FL (ref 82–102)
MONOCYTES # BLD: 0.6 K/UL (ref 0.1–1.3)
MONOCYTES NFR BLD: 7 % (ref 4–12)
NEUTS SEG # BLD: 5.8 K/UL (ref 1.7–8.2)
NEUTS SEG NFR BLD: 62 % (ref 43–78)
NRBC # BLD: 0 K/UL (ref 0–0.2)
PLATELET # BLD AUTO: 204 K/UL (ref 150–450)
PMV BLD AUTO: 10.7 FL (ref 9.4–12.3)
POTASSIUM SERPL-SCNC: 4.2 MMOL/L (ref 3.5–5.1)
PROT SERPL-MCNC: 6.8 G/DL (ref 6.3–8.2)
RBC # BLD AUTO: 4.35 M/UL (ref 4.23–5.6)
SODIUM SERPL-SCNC: 138 MMOL/L (ref 133–143)
TRIGL SERPL-MCNC: 160 MG/DL (ref 35–150)
VLDLC SERPL CALC-MCNC: 32 MG/DL (ref 6–23)
WBC # BLD AUTO: 9.3 K/UL (ref 4.3–11.1)

## 2022-12-05 DIAGNOSIS — E11.22 TYPE 2 DIABETES MELLITUS WITH STAGE 3A CHRONIC KIDNEY DISEASE, WITHOUT LONG-TERM CURRENT USE OF INSULIN (HCC): ICD-10-CM

## 2022-12-05 DIAGNOSIS — N18.31 TYPE 2 DIABETES MELLITUS WITH STAGE 3A CHRONIC KIDNEY DISEASE, WITHOUT LONG-TERM CURRENT USE OF INSULIN (HCC): ICD-10-CM

## 2022-12-10 DIAGNOSIS — N18.31 TYPE 2 DIABETES MELLITUS WITH STAGE 3A CHRONIC KIDNEY DISEASE, WITHOUT LONG-TERM CURRENT USE OF INSULIN (HCC): ICD-10-CM

## 2022-12-10 DIAGNOSIS — E11.22 TYPE 2 DIABETES MELLITUS WITH STAGE 3A CHRONIC KIDNEY DISEASE, WITHOUT LONG-TERM CURRENT USE OF INSULIN (HCC): ICD-10-CM

## 2022-12-11 ENCOUNTER — PATIENT MESSAGE (OUTPATIENT)
Dept: PRIMARY CARE CLINIC | Facility: CLINIC | Age: 72
End: 2022-12-11

## 2022-12-11 DIAGNOSIS — N18.31 TYPE 2 DIABETES MELLITUS WITH STAGE 3A CHRONIC KIDNEY DISEASE, WITHOUT LONG-TERM CURRENT USE OF INSULIN (HCC): ICD-10-CM

## 2022-12-11 DIAGNOSIS — E11.22 TYPE 2 DIABETES MELLITUS WITH STAGE 3A CHRONIC KIDNEY DISEASE, WITHOUT LONG-TERM CURRENT USE OF INSULIN (HCC): ICD-10-CM

## 2022-12-11 DIAGNOSIS — I25.10 CORONARY ARTERY DISEASE INVOLVING NATIVE CORONARY ARTERY OF NATIVE HEART WITHOUT ANGINA PECTORIS: ICD-10-CM

## 2022-12-11 DIAGNOSIS — E78.2 MIXED HYPERLIPIDEMIA: ICD-10-CM

## 2022-12-11 RX ORDER — ATORVASTATIN CALCIUM 80 MG/1
TABLET, FILM COATED ORAL
Qty: 90 TABLET | Refills: 3 | Status: SHIPPED | OUTPATIENT
Start: 2022-12-11

## 2022-12-11 NOTE — TELEPHONE ENCOUNTER
From: Irene Holder  To: Dr. Vito Houston: 12/11/2022 8:23 AM EST  Subject: Medication that did not get submitted for refill    My metformin and atorvastatin did not get renewed with my other medication. Can you send them in to the pharmacy for refill?    Thanks

## 2023-01-18 ENCOUNTER — PATIENT MESSAGE (OUTPATIENT)
Dept: PRIMARY CARE CLINIC | Facility: CLINIC | Age: 73
End: 2023-01-18

## 2023-01-18 DIAGNOSIS — F51.01 PRIMARY INSOMNIA: ICD-10-CM

## 2023-01-18 DIAGNOSIS — N18.31 TYPE 2 DIABETES MELLITUS WITH STAGE 3A CHRONIC KIDNEY DISEASE, WITHOUT LONG-TERM CURRENT USE OF INSULIN (HCC): ICD-10-CM

## 2023-01-18 DIAGNOSIS — N18.31 TYPE 2 DIABETES MELLITUS WITH STAGE 3A CHRONIC KIDNEY DISEASE, WITHOUT LONG-TERM CURRENT USE OF INSULIN (HCC): Primary | ICD-10-CM

## 2023-01-18 DIAGNOSIS — E11.22 TYPE 2 DIABETES MELLITUS WITH STAGE 3A CHRONIC KIDNEY DISEASE, WITHOUT LONG-TERM CURRENT USE OF INSULIN (HCC): Primary | ICD-10-CM

## 2023-01-18 DIAGNOSIS — E11.22 TYPE 2 DIABETES MELLITUS WITH STAGE 3A CHRONIC KIDNEY DISEASE, WITHOUT LONG-TERM CURRENT USE OF INSULIN (HCC): ICD-10-CM

## 2023-01-18 RX ORDER — TRAZODONE HYDROCHLORIDE 50 MG/1
50 TABLET ORAL NIGHTLY
Qty: 90 TABLET | Refills: 3 | Status: SHIPPED | OUTPATIENT
Start: 2023-01-18

## 2023-01-18 RX ORDER — TRAZODONE HYDROCHLORIDE 50 MG/1
50 TABLET ORAL NIGHTLY
Qty: 90 TABLET | Refills: 0 | OUTPATIENT
Start: 2023-01-18

## 2023-01-18 RX ORDER — BLOOD SUGAR DIAGNOSTIC
STRIP MISCELLANEOUS
Qty: 100 EACH | Refills: 0 | OUTPATIENT
Start: 2023-01-18

## 2023-01-18 RX ORDER — BLOOD SUGAR DIAGNOSTIC
1 STRIP MISCELLANEOUS 2 TIMES DAILY
Qty: 100 EACH | Refills: 11 | Status: SHIPPED | OUTPATIENT
Start: 2023-01-18 | End: 2023-01-20 | Stop reason: SDUPTHER

## 2023-01-19 NOTE — TELEPHONE ENCOUNTER
From: Karsten Manning  To: Dr. Rosales Mayer: 1/18/2023 3:26 PM EST  Subject: Refill prescription     Need refills for the following:  OneTouch Ultra test strips    Trazodone 50mg    Thanks

## 2023-01-20 RX ORDER — BLOOD SUGAR DIAGNOSTIC
1 STRIP MISCELLANEOUS 2 TIMES DAILY
Qty: 100 EACH | Refills: 11 | Status: SHIPPED | OUTPATIENT
Start: 2023-01-20 | End: 2023-01-22 | Stop reason: SDUPTHER

## 2023-01-22 RX ORDER — BLOOD SUGAR DIAGNOSTIC
1 STRIP MISCELLANEOUS DAILY
Qty: 100 EACH | Refills: 5 | Status: SHIPPED | OUTPATIENT
Start: 2023-01-22

## 2023-02-06 ENCOUNTER — PATIENT MESSAGE (OUTPATIENT)
Dept: PRIMARY CARE CLINIC | Facility: CLINIC | Age: 73
End: 2023-02-06

## 2023-02-07 ENCOUNTER — TELEPHONE (OUTPATIENT)
Dept: PRIMARY CARE CLINIC | Facility: CLINIC | Age: 73
End: 2023-02-07

## 2023-02-07 RX ORDER — AMOXICILLIN AND CLAVULANATE POTASSIUM 500; 125 MG/1; MG/1
1 TABLET, FILM COATED ORAL 2 TIMES DAILY
Qty: 20 TABLET | Refills: 0 | Status: SHIPPED | OUTPATIENT
Start: 2023-02-07 | End: 2023-02-17

## 2023-02-07 NOTE — TELEPHONE ENCOUNTER
From: Dylan Bryant  To: Dr. Yarelis Buckley: 2/6/2023 5:34 PM EST  Subject: Nose bleeding    My nose has been bleeding off and on for days. Last night it bled a lot. This morning I got up and it was literally pouring. I finally got it to stop but I am still have some bleeding. I may have a sinus infection because I have had congestion. I tried going to [facility] but they were only taking emergency cases. Can you work me in or Call something in to help. You can reach me at [phone]. Thanks.

## 2023-02-07 NOTE — TELEPHONE ENCOUNTER
Pt is requesting a call back about Nose bleed. Nose bled off /on last nite.  Poured this morning finally stop

## 2023-02-13 ENCOUNTER — PATIENT MESSAGE (OUTPATIENT)
Dept: PRIMARY CARE CLINIC | Facility: CLINIC | Age: 73
End: 2023-02-13

## 2023-02-13 DIAGNOSIS — N18.31 TYPE 2 DIABETES MELLITUS WITH STAGE 3A CHRONIC KIDNEY DISEASE, WITHOUT LONG-TERM CURRENT USE OF INSULIN (HCC): ICD-10-CM

## 2023-02-13 DIAGNOSIS — E78.2 MIXED HYPERLIPIDEMIA: ICD-10-CM

## 2023-02-13 DIAGNOSIS — I10 ESSENTIAL HYPERTENSION: ICD-10-CM

## 2023-02-13 DIAGNOSIS — E11.22 TYPE 2 DIABETES MELLITUS WITH STAGE 3A CHRONIC KIDNEY DISEASE, WITHOUT LONG-TERM CURRENT USE OF INSULIN (HCC): ICD-10-CM

## 2023-02-13 DIAGNOSIS — F32.5 MAJOR DEPRESSIVE DISORDER WITH SINGLE EPISODE, IN FULL REMISSION (HCC): ICD-10-CM

## 2023-02-13 DIAGNOSIS — N18.31 STAGE 3A CHRONIC KIDNEY DISEASE (HCC): ICD-10-CM

## 2023-02-13 DIAGNOSIS — I25.10 CORONARY ARTERY DISEASE INVOLVING NATIVE CORONARY ARTERY OF NATIVE HEART WITHOUT ANGINA PECTORIS: ICD-10-CM

## 2023-02-14 RX ORDER — FENOFIBRATE 160 MG/1
160 TABLET ORAL DAILY
Qty: 90 TABLET | Refills: 1 | OUTPATIENT
Start: 2023-02-14

## 2023-02-14 RX ORDER — CITALOPRAM 20 MG/1
20 TABLET ORAL DAILY
Qty: 90 TABLET | Refills: 1 | OUTPATIENT
Start: 2023-02-14

## 2023-02-14 RX ORDER — LISINOPRIL 20 MG/1
20 TABLET ORAL DAILY
Qty: 90 TABLET | Refills: 1 | Status: SHIPPED | OUTPATIENT
Start: 2023-02-14 | End: 2023-02-16 | Stop reason: SDUPTHER

## 2023-02-16 RX ORDER — CITALOPRAM 20 MG/1
20 TABLET ORAL DAILY
Qty: 90 TABLET | Refills: 1 | Status: SHIPPED | OUTPATIENT
Start: 2023-02-16

## 2023-02-16 RX ORDER — LISINOPRIL 20 MG/1
20 TABLET ORAL DAILY
Qty: 90 TABLET | Refills: 1 | Status: SHIPPED | OUTPATIENT
Start: 2023-02-16

## 2023-02-16 RX ORDER — FENOFIBRATE 160 MG/1
160 TABLET ORAL DAILY
Qty: 90 TABLET | Refills: 1 | Status: SHIPPED | OUTPATIENT
Start: 2023-02-16

## 2023-02-16 NOTE — TELEPHONE ENCOUNTER
From: Nicki Berry  To: Dr. Hamilton Antunez: 2/13/2023 7:20 PM EST  Subject: Refills for prescriptions previously written by Dr Severiano Lecher    There are 3 prescription that I need refills on:  Fenofibrate 160mg  Lisinopril 20 mg  Citalopram 20 mg    Can you order these for me? Thanks very much!

## 2023-03-16 ENCOUNTER — OFFICE VISIT (OUTPATIENT)
Dept: PRIMARY CARE CLINIC | Facility: CLINIC | Age: 73
End: 2023-03-16
Payer: MEDICARE

## 2023-03-16 VITALS
HEART RATE: 72 BPM | SYSTOLIC BLOOD PRESSURE: 130 MMHG | TEMPERATURE: 97.3 F | WEIGHT: 201.2 LBS | DIASTOLIC BLOOD PRESSURE: 71 MMHG | BODY MASS INDEX: 27.25 KG/M2 | OXYGEN SATURATION: 97 % | HEIGHT: 72 IN

## 2023-03-16 DIAGNOSIS — E55.9 VITAMIN D DEFICIENCY: ICD-10-CM

## 2023-03-16 DIAGNOSIS — N18.31 TYPE 2 DIABETES MELLITUS WITH STAGE 3A CHRONIC KIDNEY DISEASE, WITHOUT LONG-TERM CURRENT USE OF INSULIN (HCC): Primary | ICD-10-CM

## 2023-03-16 DIAGNOSIS — F32.5 MAJOR DEPRESSIVE DISORDER WITH SINGLE EPISODE, IN FULL REMISSION (HCC): ICD-10-CM

## 2023-03-16 DIAGNOSIS — I10 PRIMARY HYPERTENSION: ICD-10-CM

## 2023-03-16 DIAGNOSIS — E11.22 TYPE 2 DIABETES MELLITUS WITH STAGE 3A CHRONIC KIDNEY DISEASE, WITHOUT LONG-TERM CURRENT USE OF INSULIN (HCC): Primary | ICD-10-CM

## 2023-03-16 DIAGNOSIS — J34.89 PAIN OF MAXILLARY SINUS: ICD-10-CM

## 2023-03-16 DIAGNOSIS — Z79.899 ENCOUNTER FOR LONG-TERM (CURRENT) USE OF MEDICATIONS: ICD-10-CM

## 2023-03-16 DIAGNOSIS — Z13.220 SCREENING FOR LIPID DISORDERS: ICD-10-CM

## 2023-03-16 DIAGNOSIS — R09.82 POST-NASAL DRAINAGE: ICD-10-CM

## 2023-03-16 DIAGNOSIS — J30.1 SEASONAL ALLERGIC RHINITIS DUE TO POLLEN: ICD-10-CM

## 2023-03-16 LAB
BASOPHILS # BLD: 0.1 K/UL (ref 0–0.2)
BASOPHILS NFR BLD: 1 % (ref 0–2)
DIFFERENTIAL METHOD BLD: ABNORMAL
EOSINOPHIL # BLD: 0.3 K/UL (ref 0–0.8)
EOSINOPHIL NFR BLD: 3 % (ref 0.5–7.8)
ERYTHROCYTE [DISTWIDTH] IN BLOOD BY AUTOMATED COUNT: 13.5 % (ref 11.9–14.6)
HCT VFR BLD AUTO: 38.8 % (ref 41.1–50.3)
HGB BLD-MCNC: 12.7 G/DL (ref 13.6–17.2)
IMM GRANULOCYTES # BLD AUTO: 0.1 K/UL (ref 0–0.5)
IMM GRANULOCYTES NFR BLD AUTO: 1 % (ref 0–5)
LYMPHOCYTES # BLD: 2.5 K/UL (ref 0.5–4.6)
LYMPHOCYTES NFR BLD: 29 % (ref 13–44)
MCH RBC QN AUTO: 31.1 PG (ref 26.1–32.9)
MCHC RBC AUTO-ENTMCNC: 32.7 G/DL (ref 31.4–35)
MCV RBC AUTO: 95.1 FL (ref 82–102)
MONOCYTES # BLD: 0.6 K/UL (ref 0.1–1.3)
MONOCYTES NFR BLD: 7 % (ref 4–12)
NEUTS SEG # BLD: 5.2 K/UL (ref 1.7–8.2)
NEUTS SEG NFR BLD: 59 % (ref 43–78)
NRBC # BLD: 0 K/UL (ref 0–0.2)
PLATELET # BLD AUTO: 271 K/UL (ref 150–450)
PMV BLD AUTO: 10.2 FL (ref 9.4–12.3)
RBC # BLD AUTO: 4.08 M/UL (ref 4.23–5.6)
WBC # BLD AUTO: 8.8 K/UL (ref 4.3–11.1)

## 2023-03-16 PROCEDURE — 1123F ACP DISCUSS/DSCN MKR DOCD: CPT | Performed by: FAMILY MEDICINE

## 2023-03-16 PROCEDURE — 2022F DILAT RTA XM EVC RTNOPTHY: CPT | Performed by: FAMILY MEDICINE

## 2023-03-16 PROCEDURE — G8417 CALC BMI ABV UP PARAM F/U: HCPCS | Performed by: FAMILY MEDICINE

## 2023-03-16 PROCEDURE — 3074F SYST BP LT 130 MM HG: CPT | Performed by: FAMILY MEDICINE

## 2023-03-16 PROCEDURE — 99214 OFFICE O/P EST MOD 30 MIN: CPT | Performed by: FAMILY MEDICINE

## 2023-03-16 PROCEDURE — 3017F COLORECTAL CA SCREEN DOC REV: CPT | Performed by: FAMILY MEDICINE

## 2023-03-16 PROCEDURE — G8427 DOCREV CUR MEDS BY ELIG CLIN: HCPCS | Performed by: FAMILY MEDICINE

## 2023-03-16 PROCEDURE — 3051F HG A1C>EQUAL 7.0%<8.0%: CPT | Performed by: FAMILY MEDICINE

## 2023-03-16 PROCEDURE — 4004F PT TOBACCO SCREEN RCVD TLK: CPT | Performed by: FAMILY MEDICINE

## 2023-03-16 PROCEDURE — G8484 FLU IMMUNIZE NO ADMIN: HCPCS | Performed by: FAMILY MEDICINE

## 2023-03-16 PROCEDURE — 3078F DIAST BP <80 MM HG: CPT | Performed by: FAMILY MEDICINE

## 2023-03-16 RX ORDER — AMLODIPINE BESYLATE 10 MG/1
TABLET ORAL
Qty: 90 TABLET | Refills: 3 | Status: SHIPPED | OUTPATIENT
Start: 2023-03-16

## 2023-03-16 RX ORDER — AZELASTINE HYDROCHLORIDE 137 UG/1
SPRAY, METERED NASAL
COMMUNITY
Start: 2023-02-26

## 2023-03-16 RX ORDER — METHYLPREDNISOLONE 4 MG/1
TABLET ORAL
Qty: 1 KIT | Refills: 0 | Status: SHIPPED | OUTPATIENT
Start: 2023-03-16

## 2023-03-16 SDOH — ECONOMIC STABILITY: FOOD INSECURITY: WITHIN THE PAST 12 MONTHS, THE FOOD YOU BOUGHT JUST DIDN'T LAST AND YOU DIDN'T HAVE MONEY TO GET MORE.: NEVER TRUE

## 2023-03-16 SDOH — ECONOMIC STABILITY: HOUSING INSECURITY
IN THE LAST 12 MONTHS, WAS THERE A TIME WHEN YOU DID NOT HAVE A STEADY PLACE TO SLEEP OR SLEPT IN A SHELTER (INCLUDING NOW)?: NO

## 2023-03-16 SDOH — ECONOMIC STABILITY: INCOME INSECURITY: HOW HARD IS IT FOR YOU TO PAY FOR THE VERY BASICS LIKE FOOD, HOUSING, MEDICAL CARE, AND HEATING?: NOT HARD AT ALL

## 2023-03-16 SDOH — ECONOMIC STABILITY: FOOD INSECURITY: WITHIN THE PAST 12 MONTHS, YOU WORRIED THAT YOUR FOOD WOULD RUN OUT BEFORE YOU GOT MONEY TO BUY MORE.: NEVER TRUE

## 2023-03-16 ASSESSMENT — PATIENT HEALTH QUESTIONNAIRE - PHQ9
3. TROUBLE FALLING OR STAYING ASLEEP: 0
5. POOR APPETITE OR OVEREATING: 0
6. FEELING BAD ABOUT YOURSELF - OR THAT YOU ARE A FAILURE OR HAVE LET YOURSELF OR YOUR FAMILY DOWN: 0
SUM OF ALL RESPONSES TO PHQ QUESTIONS 1-9: 0
4. FEELING TIRED OR HAVING LITTLE ENERGY: 0
8. MOVING OR SPEAKING SO SLOWLY THAT OTHER PEOPLE COULD HAVE NOTICED. OR THE OPPOSITE, BEING SO FIGETY OR RESTLESS THAT YOU HAVE BEEN MOVING AROUND A LOT MORE THAN USUAL: 0
9. THOUGHTS THAT YOU WOULD BE BETTER OFF DEAD, OR OF HURTING YOURSELF: 0
10. IF YOU CHECKED OFF ANY PROBLEMS, HOW DIFFICULT HAVE THESE PROBLEMS MADE IT FOR YOU TO DO YOUR WORK, TAKE CARE OF THINGS AT HOME, OR GET ALONG WITH OTHER PEOPLE: 0
1. LITTLE INTEREST OR PLEASURE IN DOING THINGS: 0
SUM OF ALL RESPONSES TO PHQ9 QUESTIONS 1 & 2: 0
SUM OF ALL RESPONSES TO PHQ QUESTIONS 1-9: 0
SUM OF ALL RESPONSES TO PHQ QUESTIONS 1-9: 0
2. FEELING DOWN, DEPRESSED OR HOPELESS: 0
SUM OF ALL RESPONSES TO PHQ QUESTIONS 1-9: 0
7. TROUBLE CONCENTRATING ON THINGS, SUCH AS READING THE NEWSPAPER OR WATCHING TELEVISION: 0

## 2023-03-16 NOTE — PROGRESS NOTES
Ramila Zabala M.D.  [address] [facility]  Phone:  [phone]  Fax:  [phone]    CHIEF COMPLAINT:  Chief Complaint   Patient presents with    Diabetes     Last a1c 7.1. Pt states his blood sugar has been running in 150's-170's. He takes his medication as prescribed. Allergies     Pt c/o severe allergies over the past month. He has nasal congestion, sinus pressure, some cough when he is outside. He takes Zyrtec. He went to urgent care a few weeks ago and was treated with 7 days of doxycycline and azelatstin. He was also given Augmentin 2/6/23 for nose bleeding. HISTORY OF PRESENT ILLNESS:  Mr. Johana Sagastume is a 68 y.o. male  who presents for follow up. HISTORY:  No Known Allergies      REVIEW OF SYSTEMS:  Review of systems is as indicated in HPI otherwise negative. PHYSICAL EXAM:  Vital Signs -   Visit Vitals  /71   Pulse 72   Temp 97.3 °F (36.3 °C) (Temporal)   Ht 6' (1.829 m)   Wt 201 lb 3.2 oz (91.3 kg)   SpO2 97%   BMI 27.29 kg/m²        Physical Exam  Vitals and nursing note reviewed. Constitutional:       Appearance: Normal appearance. HENT:      Head: Normocephalic and atraumatic. Nose: Nose normal.      Mouth/Throat:      Mouth: Mucous membranes are moist.   Eyes:      Extraocular Movements: Extraocular movements intact. Pupils: Pupils are equal, round, and reactive to light. Cardiovascular:      Rate and Rhythm: Normal rate and regular rhythm. Pulses: Normal pulses. Heart sounds: Normal heart sounds. Pulmonary:      Effort: Pulmonary effort is normal.      Breath sounds: Normal breath sounds. Abdominal:      General: Abdomen is flat. Bowel sounds are normal.      Palpations: Abdomen is soft. Musculoskeletal:         General: Normal range of motion. Cervical back: Normal range of motion and neck supple. Skin:     General: Skin is warm and dry.    Neurological:      General: No focal deficit present. Mental Status: He is alert and oriented to person, place, and time. Psychiatric:         Mood and Affect: Mood normal.         Behavior: Behavior normal.         PHQ:  PHQ-9  3/16/2023   Little interest or pleasure in doing things 0   Little interest or pleasure in doing things -   Feeling down, depressed, or hopeless 0   Trouble falling or staying asleep, or sleeping too much 0   Trouble falling or staying asleep, or sleeping too much -   Feeling tired or having little energy 0   Feeling tired or having little energy -   Poor appetite or overeating 0   Poor appetite, weight loss, or overeating -   Feeling bad about yourself - or that you are a failure or have let yourself or your family down 0   Feeling bad about yourself - or that you are a failure or have let yourself or your family down -   Trouble concentrating on things, such as reading the newspaper or watching television 0   Trouble concentrating on things such as school, work, reading, or watching TV -   Moving or speaking so slowly that other people could have noticed. Or the opposite - being so fidgety or restless that you have been moving around a lot more than usual 0   Moving or speaking so slowly that other people could have noticed; or the opposite being so fidgety that others notice -   Thoughts that you would be better off dead, or of hurting yourself in some way 0   Thoughts of being better off dead, or hurting yourself in some way -   PHQ-2 Score 0   Total Score PHQ 2 -   PHQ-9 Total Score 0   PHQ 9 Score -   If you checked off any problems, how difficult have these problems made it for you to do your work, take care of things at home, or get along with other people? 0   How difficult have these problems made it for you to do your work, take care of your home and get along with others -       LABS  No results found for this visit on 03/16/23.   Office Visit on 11/15/2022   Component Date Value Ref Range Status    WBC 11/15/2022 9.3  4.3 - 11.1 K/uL Final    RBC 11/15/2022 4.35  4.23 - 5.6 M/uL Final    Hemoglobin 11/15/2022 14.0  13.6 - 17.2 g/dL Final    Hematocrit 11/15/2022 42.3  41.1 - 50.3 % Final    MCV 11/15/2022 97.2  82 - 102 FL Final    MCH 11/15/2022 32.2  26.1 - 32.9 PG Final    MCHC 11/15/2022 33.1  31.4 - 35.0 g/dL Final    RDW 11/15/2022 13.6  11.9 - 14.6 % Final    Platelets 15/74/5238 204  150 - 450 K/uL Final    MPV 11/15/2022 10.7  9.4 - 12.3 FL Final    nRBC 11/15/2022 0.00  0.0 - 0.2 K/uL Final    **Note: Absolute NRBC parameter is now reported with Hemogram**    Differential Type 11/15/2022 AUTOMATED    Final    Seg Neutrophils 11/15/2022 62  43 - 78 % Final    Lymphocytes 11/15/2022 27  13 - 44 % Final    Monocytes 11/15/2022 7  4.0 - 12.0 % Final    Eosinophils % 11/15/2022 3  0.5 - 7.8 % Final    Basophils 11/15/2022 1  0.0 - 2.0 % Final    Immature Granulocytes 11/15/2022 0  0.0 - 5.0 % Final    Segs Absolute 11/15/2022 5.8  1.7 - 8.2 K/UL Final    Absolute Lymph # 11/15/2022 2.5  0.5 - 4.6 K/UL Final    Absolute Mono # 11/15/2022 0.6  0.1 - 1.3 K/UL Final    Absolute Eos # 11/15/2022 0.3  0.0 - 0.8 K/UL Final    Basophils Absolute 11/15/2022 0.1  0.0 - 0.2 K/UL Final    Absolute Immature Granulocyte 11/15/2022 0.0  0.0 - 0.5 K/UL Final    Sodium 11/15/2022 138  133 - 143 mmol/L Final    Potassium 11/15/2022 4.2  3.5 - 5.1 mmol/L Final    Chloride 11/15/2022 109  101 - 110 mmol/L Final    CO2 11/15/2022 26  21 - 32 mmol/L Final    Anion Gap 11/15/2022 3  2 - 11 mmol/L Final    Glucose 11/15/2022 168 (A)  65 - 100 mg/dL Final    BUN 11/15/2022 20  8 - 23 MG/DL Final    Creatinine 11/15/2022 1.10  0.8 - 1.5 MG/DL Final    Est, Glom Filt Rate 11/15/2022 >60  >60 ml/min/1.73m2 Final    Comment:   Pediatric calculator link: [employer].at. org/professionals/kdoqi/gfr_calculatorped    Effective Oct 3, 2022    These results are not intended for use in patients <25years of age.     eGFR results are calculated without a race factor using  the 2021 CKD-EPI equation. Careful clinical correlation is recommended, particularly when comparing to results calculated using previous equations. The CKD-EPI equation is less accurate in patients with extremes of muscle mass, extra-renal metabolism of creatinine, excessive creatine ingestion, or following therapy that affects renal tubular secretion. Calcium 11/15/2022 10.3  8.3 - 10.4 MG/DL Final    Total Bilirubin 11/15/2022 0.3  0.2 - 1.1 MG/DL Final    ALT 11/15/2022 36  12 - 65 U/L Final    AST 11/15/2022 25  15 - 37 U/L Final    Alk Phosphatase 11/15/2022 80  50 - 136 U/L Final    Total Protein 11/15/2022 6.8  6.3 - 8.2 g/dL Final    Albumin 11/15/2022 4.1  3.2 - 4.6 g/dL Final    Globulin 11/15/2022 2.7 (A)  2.8 - 4.5 g/dL Final    Albumin/Globulin Ratio 11/15/2022 1.5  0.4 - 1.6   Final    Vit D, 25-Hydroxy 11/15/2022 30.7  30.0 - 100.0 ng/mL Final    Cholesterol, Total 11/15/2022 155  <200 MG/DL Final    Comment: Borderline High: 200-239 mg/dL  High: Greater than or equal to 240 mg/dL      Triglycerides 11/15/2022 160 (A)  35 - 150 MG/DL Final    Comment: Borderline High: 150-199 mg/dL, High: 200-499 mg/dL  Very High: Greater than or equal to 500 mg/dL      HDL 11/15/2022 43  40 - 60 MG/DL Final    LDL Calculated 11/15/2022 80  <100 MG/DL Final    Comment: Near Optimal: 100-129 mg/dL  Borderline High: 130-159, High: 160-189 mg/dL  Very High: Greater than or equal to 190 mg/dL      VLDL Cholesterol Calculated 11/15/2022 32 (A)  6.0 - 23.0 MG/DL Final    Chol/HDL Ratio 11/15/2022 3.6    Final    Hemoglobin A1C 11/15/2022 7.1 (A)  4.8 - 5.6 % Final    eAG 11/15/2022 157  mg/dL Final    Comment: Reference Range  Normal: 4.8-5.6  Diabetic >=6.5%  Normal       <5.7%         IMPRESSION/PLAN     Diagnosis Orders   1.  Type 2 diabetes mellitus with stage 3a chronic kidney disease, without long-term current use of insulin ([facility])   DIABETES FOOT EXAM    Hemoglobin A1C 2. Major depressive disorder with single episode, in full remission ([facility] [address]        3. Primary hypertension  amLODIPine (NORVASC) 10 MG tablet    CBC with Auto Differential    Comprehensive Metabolic Panel      4. Post-nasal drainage        5. Seasonal allergic rhinitis due to pollen        6. Pain of maxillary sinus        7. Vitamin D deficiency  Vitamin D 25 Hydroxy      8. Screening for lipid disorders  Lipid Panel      9. Encounter for long-term (current) use of medications  CBC with Auto Differential    Comprehensive Metabolic Panel          Follow up and Dispositions:  Return in about 4 months (around 7/16/2023). Patient is stable on medications and will continue current medications. Refilled the above medications. Will add the following medications: Will change the following medications:  Reviewed medications and side effects in detail. Was given samples of   Will check the above labs. Reviewed most recent labs. Reviewed diet, exercise and weight control. Cardiovascular risks and recommendations reviewed. Patient encouraged to quit smoking. Patient encouraged to take medications as prescribed. Patient encouraged to follow a diabetic/low sodium diet. Use of aspirin to prevent MIs and TIAs discussed. Patient will check blood sugars once daily. Shama Serna MD    Dictated using voice recognition software.  Proofread, but unrecognized voice recognition errors may exist.

## 2023-03-17 LAB
25(OH)D3 SERPL-MCNC: 32.7 NG/ML (ref 30–100)
ALBUMIN SERPL-MCNC: 3.8 G/DL (ref 3.2–4.6)
ALBUMIN/GLOB SERPL: 1.2 (ref 0.4–1.6)
ALP SERPL-CCNC: 54 U/L (ref 50–136)
ALT SERPL-CCNC: 28 U/L (ref 12–65)
ANION GAP SERPL CALC-SCNC: 2 MMOL/L (ref 2–11)
AST SERPL-CCNC: 21 U/L (ref 15–37)
BILIRUB SERPL-MCNC: 0.4 MG/DL (ref 0.2–1.1)
BUN SERPL-MCNC: 16 MG/DL (ref 8–23)
CALCIUM SERPL-MCNC: 10.1 MG/DL (ref 8.3–10.4)
CHLORIDE SERPL-SCNC: 110 MMOL/L (ref 101–110)
CHOLEST SERPL-MCNC: 128 MG/DL
CO2 SERPL-SCNC: 28 MMOL/L (ref 21–32)
CREAT SERPL-MCNC: 1.1 MG/DL (ref 0.8–1.5)
EST. AVERAGE GLUCOSE BLD GHB EST-MCNC: 160 MG/DL
GLOBULIN SER CALC-MCNC: 3.2 G/DL (ref 2.8–4.5)
GLUCOSE SERPL-MCNC: 73 MG/DL (ref 65–100)
HBA1C MFR BLD: 7.2 % (ref 4.8–5.6)
HDLC SERPL-MCNC: 37 MG/DL (ref 40–60)
HDLC SERPL: 3.5
LDLC SERPL CALC-MCNC: 60 MG/DL
POTASSIUM SERPL-SCNC: 4.5 MMOL/L (ref 3.5–5.1)
PROT SERPL-MCNC: 7 G/DL (ref 6.3–8.2)
SODIUM SERPL-SCNC: 140 MMOL/L (ref 133–143)
TRIGL SERPL-MCNC: 155 MG/DL (ref 35–150)
VLDLC SERPL CALC-MCNC: 31 MG/DL (ref 6–23)

## 2023-05-02 ENCOUNTER — INITIAL CONSULT (OUTPATIENT)
Dept: CARDIOLOGY CLINIC | Age: 73
End: 2023-05-02
Payer: MEDICARE

## 2023-05-02 VITALS
SYSTOLIC BLOOD PRESSURE: 140 MMHG | DIASTOLIC BLOOD PRESSURE: 62 MMHG | HEIGHT: 72 IN | BODY MASS INDEX: 27.5 KG/M2 | WEIGHT: 203 LBS | HEART RATE: 86 BPM

## 2023-05-02 DIAGNOSIS — I10 ESSENTIAL HYPERTENSION: Primary | ICD-10-CM

## 2023-05-02 DIAGNOSIS — I25.2 HISTORY OF MI (MYOCARDIAL INFARCTION): ICD-10-CM

## 2023-05-02 DIAGNOSIS — I25.10 CORONARY ARTERY DISEASE INVOLVING NATIVE CORONARY ARTERY OF NATIVE HEART WITHOUT ANGINA PECTORIS: ICD-10-CM

## 2023-05-02 DIAGNOSIS — E78.2 MIXED HYPERLIPIDEMIA: ICD-10-CM

## 2023-05-02 PROCEDURE — 3078F DIAST BP <80 MM HG: CPT | Performed by: INTERNAL MEDICINE

## 2023-05-02 PROCEDURE — G8427 DOCREV CUR MEDS BY ELIG CLIN: HCPCS | Performed by: INTERNAL MEDICINE

## 2023-05-02 PROCEDURE — 3077F SYST BP >= 140 MM HG: CPT | Performed by: INTERNAL MEDICINE

## 2023-05-02 PROCEDURE — 4004F PT TOBACCO SCREEN RCVD TLK: CPT | Performed by: INTERNAL MEDICINE

## 2023-05-02 PROCEDURE — 99204 OFFICE O/P NEW MOD 45 MIN: CPT | Performed by: INTERNAL MEDICINE

## 2023-05-02 PROCEDURE — 1123F ACP DISCUSS/DSCN MKR DOCD: CPT | Performed by: INTERNAL MEDICINE

## 2023-05-02 PROCEDURE — 3017F COLORECTAL CA SCREEN DOC REV: CPT | Performed by: INTERNAL MEDICINE

## 2023-05-02 PROCEDURE — G8417 CALC BMI ABV UP PARAM F/U: HCPCS | Performed by: INTERNAL MEDICINE

## 2023-05-02 PROCEDURE — 93000 ELECTROCARDIOGRAM COMPLETE: CPT | Performed by: INTERNAL MEDICINE

## 2023-05-02 NOTE — PROGRESS NOTES
[address], [address] [employer] [address]  PHONE: [phone]        23    NAME:  Gavino Burleson  : 1950  MRN: [id number]       SUBJECTIVE:   Gavino Burleson is a 68 y.o. male seen for a consultation visit regarding the following:     Chief Complaint   Patient presents with    Consultation    Hypertension          HPI:  Consultation is requested by Miguel Bach MD for evaluation of Consultation and Hypertension  He has CAD, prior PCI yrs and yrs ago in [place]. No recent echo or stress testing. He has no CP, pressure. He has some WHITE, busy taking care of mother. No palp, edema. Patient denies recent history of orthopnea, PND, excessive dizziness and/or syncope. Magda Inman, his mother, is my patients. Past Medical History, Past Surgical History, Family history, Social History, and Medications were all reviewed with the patient today and updated as necessary. Current Outpatient Medications   Medication Sig Dispense Refill    Azelastine HCl 137 MCG/SPRAY SOLN USE 2 SPRAY(S) IN EACH NOSTRIL TWICE DAILY      amLODIPine (NORVASC) 10 MG tablet Take 1 tablet by mouth once daily 90 tablet 3    citalopram (CELEXA) 20 MG tablet Take 1 tablet by mouth daily 90 tablet 1    fenofibrate (TRIGLIDE) 160 MG tablet Take 1 tablet by mouth daily 90 tablet 1    lisinopril (PRINIVIL;ZESTRIL) 20 MG tablet Take 1 tablet by mouth daily 90 tablet 1    blood glucose test strips (ONETOUCH ULTRA) strip 1 each by In Vitro route daily DX: E11.22 100 each 5    traZODone (DESYREL) 50 MG tablet Take 1 tablet by mouth nightly Take 50 mg by mouth 90 tablet 3    atorvastatin (LIPITOR) 80 MG tablet Take 1 tablet by mouth once daily 90 tablet 3    metFORMIN (GLUCOPHAGE) 1000 MG tablet Take 1 tablet by mouth 2 times daily (with meals) 180 tablet 3    Lancets MISC Test blood sugar daily.  Dx:E11.9      aspirin 81 MG EC tablet Take 1 tablet by mouth       No current

## 2023-07-20 ENCOUNTER — OFFICE VISIT (OUTPATIENT)
Dept: PRIMARY CARE CLINIC | Facility: CLINIC | Age: 73
End: 2023-07-20

## 2023-07-20 VITALS
HEART RATE: 79 BPM | WEIGHT: 198.6 LBS | TEMPERATURE: 97.4 F | HEIGHT: 71 IN | SYSTOLIC BLOOD PRESSURE: 131 MMHG | OXYGEN SATURATION: 97 % | DIASTOLIC BLOOD PRESSURE: 77 MMHG | BODY MASS INDEX: 27.8 KG/M2

## 2023-07-20 DIAGNOSIS — I10 ESSENTIAL HYPERTENSION: ICD-10-CM

## 2023-07-20 DIAGNOSIS — Z00.00 MEDICARE ANNUAL WELLNESS VISIT, SUBSEQUENT: Primary | ICD-10-CM

## 2023-07-20 DIAGNOSIS — N18.31 TYPE 2 DIABETES MELLITUS WITH STAGE 3A CHRONIC KIDNEY DISEASE, WITHOUT LONG-TERM CURRENT USE OF INSULIN (HCC): ICD-10-CM

## 2023-07-20 DIAGNOSIS — I25.10 CORONARY ARTERY DISEASE INVOLVING NATIVE CORONARY ARTERY OF NATIVE HEART WITHOUT ANGINA PECTORIS: ICD-10-CM

## 2023-07-20 DIAGNOSIS — N18.31 STAGE 3A CHRONIC KIDNEY DISEASE (HCC): ICD-10-CM

## 2023-07-20 DIAGNOSIS — Z79.899 ENCOUNTER FOR LONG-TERM (CURRENT) USE OF MEDICATIONS: ICD-10-CM

## 2023-07-20 DIAGNOSIS — E11.22 TYPE 2 DIABETES MELLITUS WITH STAGE 3A CHRONIC KIDNEY DISEASE, WITHOUT LONG-TERM CURRENT USE OF INSULIN (HCC): ICD-10-CM

## 2023-07-20 DIAGNOSIS — E78.2 MIXED HYPERLIPIDEMIA: ICD-10-CM

## 2023-07-20 DIAGNOSIS — F32.5 MAJOR DEPRESSIVE DISORDER WITH SINGLE EPISODE, IN FULL REMISSION (HCC): ICD-10-CM

## 2023-07-20 RX ORDER — ATORVASTATIN CALCIUM 80 MG/1
TABLET, FILM COATED ORAL
Qty: 90 TABLET | Refills: 3 | Status: SHIPPED | OUTPATIENT
Start: 2023-07-20

## 2023-07-20 RX ORDER — CITALOPRAM 20 MG/1
20 TABLET ORAL DAILY
Qty: 90 TABLET | Refills: 3 | Status: SHIPPED | OUTPATIENT
Start: 2023-07-20

## 2023-07-20 RX ORDER — LISINOPRIL 20 MG/1
20 TABLET ORAL DAILY
Qty: 90 TABLET | Refills: 3 | Status: SHIPPED | OUTPATIENT
Start: 2023-07-20

## 2023-07-20 RX ORDER — FENOFIBRATE 160 MG/1
160 TABLET ORAL DAILY
Qty: 90 TABLET | Refills: 3 | Status: SHIPPED | OUTPATIENT
Start: 2023-07-20

## 2023-07-20 ASSESSMENT — PATIENT HEALTH QUESTIONNAIRE - PHQ9
SUM OF ALL RESPONSES TO PHQ9 QUESTIONS 1 & 2: 1
SUM OF ALL RESPONSES TO PHQ QUESTIONS 1-9: 5
4. FEELING TIRED OR HAVING LITTLE ENERGY: 1
SUM OF ALL RESPONSES TO PHQ QUESTIONS 1-9: 5
9. THOUGHTS THAT YOU WOULD BE BETTER OFF DEAD, OR OF HURTING YOURSELF: 0
3. TROUBLE FALLING OR STAYING ASLEEP: 3
10. IF YOU CHECKED OFF ANY PROBLEMS, HOW DIFFICULT HAVE THESE PROBLEMS MADE IT FOR YOU TO DO YOUR WORK, TAKE CARE OF THINGS AT HOME, OR GET ALONG WITH OTHER PEOPLE: 1
7. TROUBLE CONCENTRATING ON THINGS, SUCH AS READING THE NEWSPAPER OR WATCHING TELEVISION: 0
8. MOVING OR SPEAKING SO SLOWLY THAT OTHER PEOPLE COULD HAVE NOTICED. OR THE OPPOSITE, BEING SO FIGETY OR RESTLESS THAT YOU HAVE BEEN MOVING AROUND A LOT MORE THAN USUAL: 0
SUM OF ALL RESPONSES TO PHQ QUESTIONS 1-9: 5
6. FEELING BAD ABOUT YOURSELF - OR THAT YOU ARE A FAILURE OR HAVE LET YOURSELF OR YOUR FAMILY DOWN: 0
1. LITTLE INTEREST OR PLEASURE IN DOING THINGS: 0
5. POOR APPETITE OR OVEREATING: 0
2. FEELING DOWN, DEPRESSED OR HOPELESS: 1
SUM OF ALL RESPONSES TO PHQ QUESTIONS 1-9: 5

## 2023-07-20 ASSESSMENT — LIFESTYLE VARIABLES
HOW OFTEN DO YOU HAVE A DRINK CONTAINING ALCOHOL: NEVER
HOW MANY STANDARD DRINKS CONTAINING ALCOHOL DO YOU HAVE ON A TYPICAL DAY: PATIENT DOES NOT DRINK

## 2023-07-27 ENCOUNTER — TELEPHONE (OUTPATIENT)
Dept: PRIMARY CARE CLINIC | Facility: CLINIC | Age: 73
End: 2023-07-27

## 2023-07-27 DIAGNOSIS — R09.82 POST-NASAL DRAINAGE: ICD-10-CM

## 2023-07-27 DIAGNOSIS — J30.1 SEASONAL ALLERGIC RHINITIS DUE TO POLLEN: Primary | ICD-10-CM

## 2023-07-27 DIAGNOSIS — J34.89 PAIN OF MAXILLARY SINUS: ICD-10-CM

## 2023-07-28 ENCOUNTER — OFFICE VISIT (OUTPATIENT)
Age: 73
End: 2023-07-28
Payer: MEDICARE

## 2023-07-28 VITALS
BODY MASS INDEX: 28.08 KG/M2 | WEIGHT: 200.6 LBS | HEART RATE: 98 BPM | DIASTOLIC BLOOD PRESSURE: 68 MMHG | HEIGHT: 71 IN | SYSTOLIC BLOOD PRESSURE: 140 MMHG

## 2023-07-28 DIAGNOSIS — I25.10 CORONARY ARTERY DISEASE INVOLVING NATIVE CORONARY ARTERY OF NATIVE HEART WITHOUT ANGINA PECTORIS: Primary | ICD-10-CM

## 2023-07-28 DIAGNOSIS — E78.2 MIXED HYPERLIPIDEMIA: ICD-10-CM

## 2023-07-28 DIAGNOSIS — I10 ESSENTIAL HYPERTENSION: ICD-10-CM

## 2023-07-28 PROCEDURE — 99214 OFFICE O/P EST MOD 30 MIN: CPT | Performed by: INTERNAL MEDICINE

## 2023-07-28 PROCEDURE — 4004F PT TOBACCO SCREEN RCVD TLK: CPT | Performed by: INTERNAL MEDICINE

## 2023-07-28 PROCEDURE — 3077F SYST BP >= 140 MM HG: CPT | Performed by: INTERNAL MEDICINE

## 2023-07-28 PROCEDURE — 3078F DIAST BP <80 MM HG: CPT | Performed by: INTERNAL MEDICINE

## 2023-07-28 PROCEDURE — G8417 CALC BMI ABV UP PARAM F/U: HCPCS | Performed by: INTERNAL MEDICINE

## 2023-07-28 PROCEDURE — 3017F COLORECTAL CA SCREEN DOC REV: CPT | Performed by: INTERNAL MEDICINE

## 2023-07-28 PROCEDURE — 1123F ACP DISCUSS/DSCN MKR DOCD: CPT | Performed by: INTERNAL MEDICINE

## 2023-07-28 PROCEDURE — G8427 DOCREV CUR MEDS BY ELIG CLIN: HCPCS | Performed by: INTERNAL MEDICINE

## 2023-08-07 ENCOUNTER — OFFICE VISIT (OUTPATIENT)
Dept: ENT CLINIC | Age: 73
End: 2023-08-07
Payer: MEDICARE

## 2023-08-07 VITALS — WEIGHT: 202 LBS | HEIGHT: 71 IN | BODY MASS INDEX: 28.28 KG/M2

## 2023-08-07 DIAGNOSIS — J32.0 CHRONIC RIGHT MAXILLARY SINUSITIS: ICD-10-CM

## 2023-08-07 DIAGNOSIS — J33.9 NASAL POLYP: Primary | ICD-10-CM

## 2023-08-07 PROCEDURE — 99204 OFFICE O/P NEW MOD 45 MIN: CPT | Performed by: OTOLARYNGOLOGY

## 2023-08-07 PROCEDURE — 1123F ACP DISCUSS/DSCN MKR DOCD: CPT | Performed by: OTOLARYNGOLOGY

## 2023-08-07 PROCEDURE — 31231 NASAL ENDOSCOPY DX: CPT | Performed by: OTOLARYNGOLOGY

## 2023-08-07 PROCEDURE — 3017F COLORECTAL CA SCREEN DOC REV: CPT | Performed by: OTOLARYNGOLOGY

## 2023-08-07 PROCEDURE — G8417 CALC BMI ABV UP PARAM F/U: HCPCS | Performed by: OTOLARYNGOLOGY

## 2023-08-07 PROCEDURE — 4004F PT TOBACCO SCREEN RCVD TLK: CPT | Performed by: OTOLARYNGOLOGY

## 2023-08-07 PROCEDURE — G8428 CUR MEDS NOT DOCUMENT: HCPCS | Performed by: OTOLARYNGOLOGY

## 2023-08-07 ASSESSMENT — ENCOUNTER SYMPTOMS
COUGH: 0
WHEEZING: 0
SINUS PRESSURE: 1
VOMITING: 0
COLOR CHANGE: 0
EYE PAIN: 0
DIARRHEA: 0

## 2023-08-16 ENCOUNTER — HOSPITAL ENCOUNTER (OUTPATIENT)
Dept: CT IMAGING | Age: 73
Discharge: HOME | End: 2023-08-19
Attending: OTOLARYNGOLOGY
Payer: MEDICARE

## 2023-08-16 DIAGNOSIS — J33.9 NASAL POLYP: ICD-10-CM

## 2023-08-16 PROCEDURE — 70486 CT MAXILLOFACIAL W/O DYE: CPT

## 2023-08-21 ENCOUNTER — OFFICE VISIT (OUTPATIENT)
Dept: ENT CLINIC | Age: 73
End: 2023-08-21
Payer: MEDICARE

## 2023-08-21 ENCOUNTER — PREP FOR PROCEDURE (OUTPATIENT)
Dept: ENT CLINIC | Age: 73
End: 2023-08-21

## 2023-08-21 VITALS — HEIGHT: 71 IN | WEIGHT: 201 LBS | RESPIRATION RATE: 17 BRPM | BODY MASS INDEX: 28.14 KG/M2

## 2023-08-21 DIAGNOSIS — J33.9 NASAL POLYPOSIS: ICD-10-CM

## 2023-08-21 DIAGNOSIS — J33.9 NASAL POLYP: Primary | ICD-10-CM

## 2023-08-21 DIAGNOSIS — J32.0 CHRONIC MAXILLARY SINUSITIS: ICD-10-CM

## 2023-08-21 DIAGNOSIS — J32.0 CHRONIC MAXILLARY SINUSITIS: Primary | ICD-10-CM

## 2023-08-21 PROCEDURE — G8428 CUR MEDS NOT DOCUMENT: HCPCS | Performed by: OTOLARYNGOLOGY

## 2023-08-21 PROCEDURE — 3017F COLORECTAL CA SCREEN DOC REV: CPT | Performed by: OTOLARYNGOLOGY

## 2023-08-21 PROCEDURE — G8417 CALC BMI ABV UP PARAM F/U: HCPCS | Performed by: OTOLARYNGOLOGY

## 2023-08-21 PROCEDURE — 4004F PT TOBACCO SCREEN RCVD TLK: CPT | Performed by: OTOLARYNGOLOGY

## 2023-08-21 PROCEDURE — 1123F ACP DISCUSS/DSCN MKR DOCD: CPT | Performed by: OTOLARYNGOLOGY

## 2023-08-21 PROCEDURE — 99213 OFFICE O/P EST LOW 20 MIN: CPT | Performed by: OTOLARYNGOLOGY

## 2023-08-21 ASSESSMENT — ENCOUNTER SYMPTOMS
WHEEZING: 0
DIARRHEA: 0
EYE PAIN: 0
COLOR CHANGE: 0
COUGH: 0
VOMITING: 0

## 2023-09-27 DIAGNOSIS — G89.18 POST-OP PAIN: Primary | ICD-10-CM

## 2023-09-27 RX ORDER — ONDANSETRON 4 MG/1
4 TABLET, ORALLY DISINTEGRATING ORAL 3 TIMES DAILY PRN
Qty: 10 TABLET | Refills: 0 | Status: SHIPPED | OUTPATIENT
Start: 2023-09-27

## 2023-09-27 RX ORDER — CEPHALEXIN 500 MG/1
500 CAPSULE ORAL 4 TIMES DAILY
Qty: 28 CAPSULE | Refills: 0 | Status: SHIPPED | OUTPATIENT
Start: 2023-09-27 | End: 2023-09-29

## 2023-09-27 RX ORDER — HYDROCODONE BITARTRATE AND ACETAMINOPHEN 5; 325 MG/1; MG/1
1 TABLET ORAL EVERY 4 HOURS PRN
Qty: 30 TABLET | Refills: 0 | Status: SHIPPED | OUTPATIENT
Start: 2023-09-27 | End: 2023-09-29

## 2023-09-29 NOTE — PRE-PROCEDURE INSTRUCTIONS
Patient verified name and . Order for consent was found in EHR and matches case posting; patient verifies procedure. Type IB surgery, phone assessment complete. Orders were received. Labs per surgeon: nonefound for PAT at this time. Labs per anesthesia protocol: POC Glucose DOS. Patient answered medical/surgical history questions at their best of ability. All prior to admission medications documented in EPIC. Patient instructed to take the following medications the day of surgery according to anesthesia guidelines with a small sip of water: Aspirin 81 mg, Citalopram, Amlodipine. On the day before surgery please take Tylenol (Acetaminophen) 1000mg in the morning and then again before bed. You may substitute for Tylenol 650 mg. Hold all vitamins 7 days prior to surgery and NSAIDS 5 days prior to surgery. Prescription meds to hold:Lisinopril, Fenofibrate, metformin. Patient instructed on the following:    > Arrive at Outpatient Entrance, time of arrival to be called the day before by 1700  > NPO after midnight, unless otherwise indicated, including gum, mints, and ice chips  > Responsible adult must drive patient to the hospital, stay during surgery, and patient will need supervision 24 hours after anesthesia  > Use antibacterial soap in shower the night before surgery and on the morning of surgery  > All piercings must be removed prior to arrival.    > Leave all valuables (money and jewelry) at home but bring insurance card and ID on DOS.   > You may be required to pay a deductible or co-pay on the day of your procedure. You can pre-pay by calling [phone] if your surgery is at the [facility] or [phone] if your surgery is at the [facility]. > Do not wear make-up, nail polish, lotions, cologne, perfumes, powders, or oil on skin. Artificial nails are not permitted.

## 2023-10-03 ENCOUNTER — ANESTHESIA EVENT (OUTPATIENT)
Dept: SURGERY | Age: 73
End: 2023-10-03
Payer: MEDICARE

## 2023-10-03 NOTE — PERIOP NOTE
Preop department called to notify patient of arrival time for scheduled procedure. Instructions given to   - Arrive at [address]. - Remain NPO after midnight, unless otherwise indicated, including gum, mints, and ice chips. - Have a responsible adult to drive patient to the hospital, stay during surgery, and patient will need supervision 24 hours after anesthesia. - Use antibacterial soap in shower the night before surgery and on the morning of surgery.        Was patient contacted: yes  Voicemail left:   Numbers contacted: [phone]   Arrival time: 0800

## 2023-10-04 ENCOUNTER — ANESTHESIA (OUTPATIENT)
Dept: SURGERY | Age: 73
End: 2023-10-04
Payer: MEDICARE

## 2023-10-04 ENCOUNTER — HOSPITAL ENCOUNTER (OUTPATIENT)
Age: 73
Setting detail: OUTPATIENT SURGERY
Discharge: HOME | End: 2023-10-04
Attending: OTOLARYNGOLOGY | Admitting: OTOLARYNGOLOGY
Payer: MEDICARE

## 2023-10-04 VITALS
HEART RATE: 66 BPM | TEMPERATURE: 97.5 F | DIASTOLIC BLOOD PRESSURE: 95 MMHG | RESPIRATION RATE: 15 BRPM | WEIGHT: 200 LBS | OXYGEN SATURATION: 92 % | HEIGHT: 72 IN | SYSTOLIC BLOOD PRESSURE: 166 MMHG | BODY MASS INDEX: 27.09 KG/M2

## 2023-10-04 DIAGNOSIS — J33.9 NOSE POLYP: ICD-10-CM

## 2023-10-04 DIAGNOSIS — J32.0 CHRONIC MAXILLARY SINUSITIS: ICD-10-CM

## 2023-10-04 DIAGNOSIS — J33.9 NASAL POLYP: ICD-10-CM

## 2023-10-04 LAB
GLUCOSE BLD STRIP.AUTO-MCNC: 190 MG/DL (ref 65–100)
SERVICE CMNT-IMP: ABNORMAL

## 2023-10-04 PROCEDURE — 2720000010 HC SURG SUPPLY STERILE: Performed by: OTOLARYNGOLOGY

## 2023-10-04 PROCEDURE — 2500000003 HC RX 250 WO HCPCS: Performed by: NURSE ANESTHETIST, CERTIFIED REGISTERED

## 2023-10-04 PROCEDURE — 2500000003 HC RX 250 WO HCPCS: Performed by: OTOLARYNGOLOGY

## 2023-10-04 PROCEDURE — 2580000003 HC RX 258: Performed by: ANESTHESIOLOGY

## 2023-10-04 PROCEDURE — 3600000014 HC SURGERY LEVEL 4 ADDTL 15MIN: Performed by: OTOLARYNGOLOGY

## 2023-10-04 PROCEDURE — 6370000000 HC RX 637 (ALT 250 FOR IP): Performed by: OTOLARYNGOLOGY

## 2023-10-04 PROCEDURE — 7100000000 HC PACU RECOVERY - FIRST 15 MIN: Performed by: OTOLARYNGOLOGY

## 2023-10-04 PROCEDURE — 7100000010 HC PHASE II RECOVERY - FIRST 15 MIN: Performed by: OTOLARYNGOLOGY

## 2023-10-04 PROCEDURE — 88304 TISSUE EXAM BY PATHOLOGIST: CPT

## 2023-10-04 PROCEDURE — 2709999900 HC NON-CHARGEABLE SUPPLY: Performed by: OTOLARYNGOLOGY

## 2023-10-04 PROCEDURE — 3700000000 HC ANESTHESIA ATTENDED CARE: Performed by: OTOLARYNGOLOGY

## 2023-10-04 PROCEDURE — 6370000000 HC RX 637 (ALT 250 FOR IP): Performed by: ANESTHESIOLOGY

## 2023-10-04 PROCEDURE — 3600000004 HC SURGERY LEVEL 4 BASE: Performed by: OTOLARYNGOLOGY

## 2023-10-04 PROCEDURE — 7100000001 HC PACU RECOVERY - ADDTL 15 MIN: Performed by: OTOLARYNGOLOGY

## 2023-10-04 PROCEDURE — 3700000001 HC ADD 15 MINUTES (ANESTHESIA): Performed by: OTOLARYNGOLOGY

## 2023-10-04 PROCEDURE — 6360000002 HC RX W HCPCS: Performed by: NURSE ANESTHETIST, CERTIFIED REGISTERED

## 2023-10-04 PROCEDURE — 6370000000 HC RX 637 (ALT 250 FOR IP): Performed by: NURSE ANESTHETIST, CERTIFIED REGISTERED

## 2023-10-04 PROCEDURE — 82962 GLUCOSE BLOOD TEST: CPT

## 2023-10-04 RX ORDER — CEFAZOLIN SODIUM 1 G/3ML
INJECTION, POWDER, FOR SOLUTION INTRAMUSCULAR; INTRAVENOUS PRN
Status: DISCONTINUED | OUTPATIENT
Start: 2023-10-04 | End: 2023-10-04 | Stop reason: SDUPTHER

## 2023-10-04 RX ORDER — FENTANYL CITRATE 50 UG/ML
100 INJECTION, SOLUTION INTRAMUSCULAR; INTRAVENOUS
Status: DISCONTINUED | OUTPATIENT
Start: 2023-10-04 | End: 2023-10-04 | Stop reason: HOSPADM

## 2023-10-04 RX ORDER — OXYMETAZOLINE HYDROCHLORIDE 0.05 G/100ML
2 SPRAY NASAL PRN
Status: DISCONTINUED | OUTPATIENT
Start: 2023-10-04 | End: 2023-10-04 | Stop reason: HOSPADM

## 2023-10-04 RX ORDER — ASPIRIN 81 MG/1
81 TABLET, CHEWABLE ORAL
Status: COMPLETED | OUTPATIENT
Start: 2023-10-04 | End: 2023-10-04

## 2023-10-04 RX ORDER — ONDANSETRON 2 MG/ML
INJECTION INTRAMUSCULAR; INTRAVENOUS PRN
Status: DISCONTINUED | OUTPATIENT
Start: 2023-10-04 | End: 2023-10-04 | Stop reason: SDUPTHER

## 2023-10-04 RX ORDER — SODIUM CHLORIDE 0.9 % (FLUSH) 0.9 %
5-40 SYRINGE (ML) INJECTION EVERY 12 HOURS SCHEDULED
Status: DISCONTINUED | OUTPATIENT
Start: 2023-10-04 | End: 2023-10-04 | Stop reason: HOSPADM

## 2023-10-04 RX ORDER — LIDOCAINE HYDROCHLORIDE 10 MG/ML
1 INJECTION, SOLUTION INFILTRATION; PERINEURAL
Status: DISCONTINUED | OUTPATIENT
Start: 2023-10-04 | End: 2023-10-04 | Stop reason: HOSPADM

## 2023-10-04 RX ORDER — DIPHENHYDRAMINE HYDROCHLORIDE 50 MG/ML
12.5 INJECTION INTRAMUSCULAR; INTRAVENOUS
Status: DISCONTINUED | OUTPATIENT
Start: 2023-10-04 | End: 2023-10-04 | Stop reason: HOSPADM

## 2023-10-04 RX ORDER — LIDOCAINE HYDROCHLORIDE AND EPINEPHRINE 10; 10 MG/ML; UG/ML
INJECTION, SOLUTION INFILTRATION; PERINEURAL PRN
Status: DISCONTINUED | OUTPATIENT
Start: 2023-10-04 | End: 2023-10-04 | Stop reason: ALTCHOICE

## 2023-10-04 RX ORDER — PROCHLORPERAZINE EDISYLATE 5 MG/ML
5 INJECTION INTRAMUSCULAR; INTRAVENOUS
Status: DISCONTINUED | OUTPATIENT
Start: 2023-10-04 | End: 2023-10-04 | Stop reason: HOSPADM

## 2023-10-04 RX ORDER — SODIUM CHLORIDE 9 MG/ML
INJECTION, SOLUTION INTRAVENOUS PRN
Status: DISCONTINUED | OUTPATIENT
Start: 2023-10-04 | End: 2023-10-04 | Stop reason: HOSPADM

## 2023-10-04 RX ORDER — SODIUM CHLORIDE 0.9 % (FLUSH) 0.9 %
5-40 SYRINGE (ML) INJECTION PRN
Status: DISCONTINUED | OUTPATIENT
Start: 2023-10-04 | End: 2023-10-04 | Stop reason: HOSPADM

## 2023-10-04 RX ORDER — LIDOCAINE HYDROCHLORIDE 20 MG/ML
INJECTION, SOLUTION EPIDURAL; INFILTRATION; INTRACAUDAL; PERINEURAL PRN
Status: DISCONTINUED | OUTPATIENT
Start: 2023-10-04 | End: 2023-10-04 | Stop reason: SDUPTHER

## 2023-10-04 RX ORDER — HYDROMORPHONE HYDROCHLORIDE 2 MG/ML
0.5 INJECTION, SOLUTION INTRAMUSCULAR; INTRAVENOUS; SUBCUTANEOUS EVERY 10 MIN PRN
Status: DISCONTINUED | OUTPATIENT
Start: 2023-10-04 | End: 2023-10-04 | Stop reason: HOSPADM

## 2023-10-04 RX ORDER — GLYCOPYRROLATE 0.2 MG/ML
INJECTION INTRAMUSCULAR; INTRAVENOUS PRN
Status: DISCONTINUED | OUTPATIENT
Start: 2023-10-04 | End: 2023-10-04 | Stop reason: SDUPTHER

## 2023-10-04 RX ORDER — OXYMETAZOLINE HYDROCHLORIDE 0.05 G/100ML
SPRAY NASAL PRN
Status: DISCONTINUED | OUTPATIENT
Start: 2023-10-04 | End: 2023-10-04 | Stop reason: ALTCHOICE

## 2023-10-04 RX ORDER — NEOSTIGMINE METHYLSULFATE 1 MG/ML
INJECTION, SOLUTION INTRAVENOUS PRN
Status: DISCONTINUED | OUTPATIENT
Start: 2023-10-04 | End: 2023-10-04 | Stop reason: SDUPTHER

## 2023-10-04 RX ORDER — FENTANYL CITRATE 50 UG/ML
INJECTION, SOLUTION INTRAMUSCULAR; INTRAVENOUS PRN
Status: DISCONTINUED | OUTPATIENT
Start: 2023-10-04 | End: 2023-10-04 | Stop reason: SDUPTHER

## 2023-10-04 RX ORDER — SODIUM CHLORIDE, SODIUM LACTATE, POTASSIUM CHLORIDE, CALCIUM CHLORIDE 600; 310; 30; 20 MG/100ML; MG/100ML; MG/100ML; MG/100ML
INJECTION, SOLUTION INTRAVENOUS CONTINUOUS
Status: DISCONTINUED | OUTPATIENT
Start: 2023-10-04 | End: 2023-10-04 | Stop reason: HOSPADM

## 2023-10-04 RX ORDER — PROPOFOL 10 MG/ML
INJECTION, EMULSION INTRAVENOUS PRN
Status: DISCONTINUED | OUTPATIENT
Start: 2023-10-04 | End: 2023-10-04 | Stop reason: SDUPTHER

## 2023-10-04 RX ORDER — OXYCODONE HYDROCHLORIDE 5 MG/1
5 TABLET ORAL
Status: DISCONTINUED | OUTPATIENT
Start: 2023-10-04 | End: 2023-10-04 | Stop reason: HOSPADM

## 2023-10-04 RX ORDER — MIDAZOLAM HYDROCHLORIDE 2 MG/2ML
2 INJECTION, SOLUTION INTRAMUSCULAR; INTRAVENOUS
Status: DISCONTINUED | OUTPATIENT
Start: 2023-10-04 | End: 2023-10-04 | Stop reason: HOSPADM

## 2023-10-04 RX ORDER — MINERAL OIL AND PETROLATUM 150; 830 MG/G; MG/G
OINTMENT OPHTHALMIC PRN
Status: DISCONTINUED | OUTPATIENT
Start: 2023-10-04 | End: 2023-10-04 | Stop reason: SDUPTHER

## 2023-10-04 RX ORDER — ROCURONIUM BROMIDE 10 MG/ML
INJECTION, SOLUTION INTRAVENOUS PRN
Status: DISCONTINUED | OUTPATIENT
Start: 2023-10-04 | End: 2023-10-04 | Stop reason: SDUPTHER

## 2023-10-04 RX ORDER — DEXTROSE MONOHYDRATE 100 MG/ML
INJECTION, SOLUTION INTRAVENOUS CONTINUOUS PRN
Status: DISCONTINUED | OUTPATIENT
Start: 2023-10-04 | End: 2023-10-04 | Stop reason: HOSPADM

## 2023-10-04 RX ORDER — ACETAMINOPHEN 500 MG
1000 TABLET ORAL ONCE
Status: COMPLETED | OUTPATIENT
Start: 2023-10-04 | End: 2023-10-04

## 2023-10-04 RX ORDER — ESMOLOL HYDROCHLORIDE 10 MG/ML
INJECTION INTRAVENOUS PRN
Status: DISCONTINUED | OUTPATIENT
Start: 2023-10-04 | End: 2023-10-04 | Stop reason: SDUPTHER

## 2023-10-04 RX ORDER — DEXAMETHASONE SODIUM PHOSPHATE 4 MG/ML
INJECTION, SOLUTION INTRA-ARTICULAR; INTRALESIONAL; INTRAMUSCULAR; INTRAVENOUS; SOFT TISSUE PRN
Status: DISCONTINUED | OUTPATIENT
Start: 2023-10-04 | End: 2023-10-04 | Stop reason: SDUPTHER

## 2023-10-04 RX ADMIN — PROPOFOL 30 MG: 10 INJECTION, EMULSION INTRAVENOUS at 10:52

## 2023-10-04 RX ADMIN — ESMOLOL HYDROCHLORIDE 10 MG: 10 INJECTION, SOLUTION INTRAVENOUS at 10:47

## 2023-10-04 RX ADMIN — SODIUM CHLORIDE, POTASSIUM CHLORIDE, SODIUM LACTATE AND CALCIUM CHLORIDE: 600; 310; 30; 20 INJECTION, SOLUTION INTRAVENOUS at 08:36

## 2023-10-04 RX ADMIN — ACETAMINOPHEN 1000 MG: 500 TABLET, FILM COATED ORAL at 08:43

## 2023-10-04 RX ADMIN — PHENYLEPHRINE HYDROCHLORIDE 100 MCG: 0.1 INJECTION, SOLUTION INTRAVENOUS at 10:22

## 2023-10-04 RX ADMIN — ESMOLOL HYDROCHLORIDE 10 MG: 10 INJECTION, SOLUTION INTRAVENOUS at 11:05

## 2023-10-04 RX ADMIN — GLYCOPYRROLATE 0.4 MG: 0.2 INJECTION INTRAMUSCULAR; INTRAVENOUS at 11:15

## 2023-10-04 RX ADMIN — ESMOLOL HYDROCHLORIDE 10 MG: 10 INJECTION, SOLUTION INTRAVENOUS at 11:14

## 2023-10-04 RX ADMIN — DEXAMETHASONE SODIUM PHOSPHATE 4 MG: 4 INJECTION INTRA-ARTICULAR; INTRALESIONAL; INTRAMUSCULAR; INTRAVENOUS; SOFT TISSUE at 10:18

## 2023-10-04 RX ADMIN — ASPIRIN 81 MG 81 MG: 81 TABLET ORAL at 09:07

## 2023-10-04 RX ADMIN — ONDANSETRON 4 MG: 2 INJECTION INTRAMUSCULAR; INTRAVENOUS at 10:18

## 2023-10-04 RX ADMIN — CEFAZOLIN 2 G: 1 INJECTION, POWDER, FOR SOLUTION INTRAMUSCULAR; INTRAVENOUS at 10:16

## 2023-10-04 RX ADMIN — PROPOFOL 30 MG: 10 INJECTION, EMULSION INTRAVENOUS at 10:59

## 2023-10-04 RX ADMIN — ESMOLOL HYDROCHLORIDE 10 MG: 10 INJECTION, SOLUTION INTRAVENOUS at 10:59

## 2023-10-04 RX ADMIN — OXYMETAZOLINE HCL 2 SPRAY: 0.05 SPRAY NASAL at 08:44

## 2023-10-04 RX ADMIN — LIDOCAINE HYDROCHLORIDE 100 MG: 20 INJECTION, SOLUTION EPIDURAL; INFILTRATION; INTRACAUDAL; PERINEURAL at 10:10

## 2023-10-04 RX ADMIN — PROPOFOL 200 MG: 10 INJECTION, EMULSION INTRAVENOUS at 10:10

## 2023-10-04 RX ADMIN — Medication 3 MG: at 11:15

## 2023-10-04 RX ADMIN — FENTANYL CITRATE 100 MCG: 50 INJECTION, SOLUTION INTRAMUSCULAR; INTRAVENOUS at 10:10

## 2023-10-04 RX ADMIN — MINERAL OIL, PETROLATUM 1 EACH: 425; 573 OINTMENT OPHTHALMIC at 10:12

## 2023-10-04 RX ADMIN — PROPOFOL 30 MG: 10 INJECTION, EMULSION INTRAVENOUS at 11:05

## 2023-10-04 RX ADMIN — PROPOFOL 10 MG: 10 INJECTION, EMULSION INTRAVENOUS at 11:14

## 2023-10-04 RX ADMIN — ROCURONIUM BROMIDE 40 MG: 10 INJECTION, SOLUTION INTRAVENOUS at 10:10

## 2023-10-04 ASSESSMENT — PAIN SCALES - GENERAL
PAINLEVEL_OUTOF10: 0
PAINLEVEL_OUTOF10: 1

## 2023-10-04 NOTE — BRIEF OP NOTE
Brief Postoperative Note      Patient: Vladimir Canada  YOB: 1950  MRN: [id number]    Date of Procedure: 10/4/2023    Pre-Op Diagnosis Codes:     * Chronic maxillary sinusitis [J32.0]     * Nose polyp [J33.9]    Post-Op Diagnosis: Post-Op Diagnosis Codes:     * Chronic maxillary sinusitis [J32.0]     * Nose polyp [J33.9]       Procedure(s):  SINUS ENDOSCOPY FUNCTIONAL SURGERY IMAGE GUIDED BILATERAL MAXILLARY ANTROSTOMY WITH MUCOSA REMOVAL AND TOTAL ETHMOIDECTOMY/    Surgeon(s):  Selena Vargas MD    Assistant:  * No surgical staff found *    Anesthesia: General    IVF: 800 cc    Estimated Blood Loss (mL): 50 cc    Complications: None    Specimens:   ID Type Source Tests Collected by Time Destination   A : RIGHT NASAL POLYP Tissue Nose SURGICAL PATHOLOGY Selena Vargas MD 10/4/2023 1020    B : RIGHT MAXILLARY SINUS CONTENTS Tissue Maxillary Sinus SURGICAL PATHOLOGY Selena Vargas MD 10/4/2023 1101        Implants:  * No implants in log *      Drains: * No LDAs found *    Findings: septal spur on R side, large polyp extending back to NP- thick purulence and poly in R max sinus      Electronically signed by Selena Vargas MD on 10/4/2023 at 11:18 AM

## 2023-10-04 NOTE — ANESTHESIA POSTPROCEDURE EVALUATION
Department of Anesthesiology  Postprocedure Note    Patient: Nakia Young  MRN: [id number]  YOB: 1950  Date of evaluation: 10/4/2023      Procedure Summary     Date: 10/04/23 Room / Location: [facility] OP OR 04 / [facility]    Anesthesia Start: 1001 Anesthesia Stop: 5603    Procedure: SINUS ENDOSCOPY FUNCTIONAL SURGERY IMAGE GUIDED BILATERAL MAXILLARY ANTROSTOMY WITH MUCOSA REMOVAL AND TOTAL ETHMOIDECTOMY/ (Bilateral: Nose) Diagnosis:       Chronic maxillary sinusitis      Nose polyp      (Chronic maxillary sinusitis [J32.0])      (Nose polyp [J33.9])    Surgeons: Ricky Velez MD Responsible Provider: Ruth Forman MD    Anesthesia Type: General ASA Status: 3          Anesthesia Type: General    Bridger Phase I: Bridger Score: 8    Bridger Phase II: Bridger Score: 8      Anesthesia Post Evaluation    Patient location during evaluation: PACU  Patient participation: complete - patient participated  Level of consciousness: awake and alert  Airway patency: patent  Nausea: well controlled. Complications: no  Cardiovascular status: acceptable.   Respiratory status: acceptable  Hydration status: stable  Pain management: adequate
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
## 2023-10-04 NOTE — ANESTHESIA PROCEDURE NOTES
Airway  Date/Time: 10/4/2023 10:12 AM  Urgency: elective    Airway not difficult    General Information and Staff    Patient location during procedure: OR  Resident/CRNA: GILLES Love CRNA  Performed: resident/CRNA   Performed by: GILLES Love CRNA  Authorized by: Marcell Novoa MD      Indications and Patient Condition  Indications for airway management: anesthesia  Spontaneous Ventilation: absent  Sedation level: deep  Preoxygenated: yes  Patient position: sniffing  MILS not maintained throughout  Mask difficulty assessment: vent by bag mask + OA or adjuvant +/- NMBA    Final Airway Details  Final airway type: endotracheal airway      Successful airway: ETT  Cuffed: yes   Successful intubation technique: video laryngoscopy  Facilitating devices/methods: intubating stylet  Endotracheal tube insertion site: oral  Blade: Crocker  Blade size: #3  ETT size (mm): 8.0  Cormack-Lehane Classification: grade I - full view of glottis  Placement verified by: chest auscultation and capnometry   Measured from: lips  ETT to lips (cm): 22  Number of attempts at approach: 1  Ventilation between attempts: bag mask  Number of other approaches attempted: 0    no

## 2023-10-04 NOTE — OP NOTE
superiorly up towards the frontal sinus outflow tract, opening several more anterior ethmoid cells. Next, I dissected past the basal lamella of the middle turbinate into the posterior ethmoid region. The mucosa within the posterior ethmoid was much healthier, compared to the right side. I dissected out all the posterior ethmoid cells, but left the superior turbinate intact. I then irrigated out the left sinus cavities with copious sterile saline and packed it off with nasal pledgets soaked in Afrin for hemostasis. I removed all the nasal pledgets and re-visualized both sides. Once hemostasis was ensured, I placed a half piece of Novapak within both middle meatuses. I suctioned out both nasal cavities and suctioned out the stomach. The patient was then turned back to Anesthesia team, extubated and taken to PACU in stable condition afterwards.       Abbey Mello MD      [id number]  D:  10/04/2023 11:38  T:  10/04/2023 12:35  JOB #:  [id number]

## 2023-10-04 NOTE — DISCHARGE INSTRUCTIONS
-Please start irrigating your nose/sinuses 3-4 times daily w/ [employer]Med sinus irrigation bottle. There will be some large mucus crusts and blood clots when you irrigate for the first couple of days.  -There will be some bleeding from nose for first 2-3 days. Please change out the nasal gauze pad several times daily. You may also place a bag of ice or frozen peas across the nose to help slow down the bleeding.   -No nose blowing as this will increase the risk of bleeding.  -No strenuous activity of heavy lifting for 1 wk after surgery.  -There will be a temporary alteration in sense of smell and taste after surgery- this is normal and expected. MEDICATION INTERACTION:  During your procedure you potentially received a medication or medications which may reduce the effectiveness of oral contraceptives. Please consider other forms of contraception for 1 month following your procedure if you are currently using oral contraceptives as your primary form of birth control. In addition to this, we recommend continuing your oral contraceptive as prescribed, unless otherwise instructed by your physician, during this time    After general anesthesia or intravenous sedation, for 24 hours or while taking prescription Narcotics:  Limit your activities  Some people will feel drowsy or dizzy for up to a few hours after waking up. A responsible adult needs to be with you for the next 24 hours  Do not drive and operate hazardous machinery  Do not make important personal or business decisions  Do not drink alcoholic beverages  If you have not urinated within 8 hours after discharge, and you are experiencing discomfort from urinary retention, please go to the nearest ED. If you have sleep apnea and have a CPAP machine, please use it for all naps and sleeping. Please use caution when taking narcotics and any of your home medications that may cause drowsiness.   *  Please give a list of your current medications to your Primary Care

## 2023-10-11 ENCOUNTER — OFFICE VISIT (OUTPATIENT)
Dept: ENT CLINIC | Age: 73
End: 2023-10-11
Payer: MEDICARE

## 2023-10-11 VITALS — WEIGHT: 203 LBS | RESPIRATION RATE: 16 BRPM | BODY MASS INDEX: 27.5 KG/M2 | HEIGHT: 72 IN

## 2023-10-11 DIAGNOSIS — J33.9 NASAL POLYP: ICD-10-CM

## 2023-10-11 DIAGNOSIS — J32.0 CHRONIC RIGHT MAXILLARY SINUSITIS: Primary | ICD-10-CM

## 2023-10-11 PROCEDURE — 3017F COLORECTAL CA SCREEN DOC REV: CPT | Performed by: OTOLARYNGOLOGY

## 2023-10-11 PROCEDURE — 99213 OFFICE O/P EST LOW 20 MIN: CPT | Performed by: OTOLARYNGOLOGY

## 2023-10-11 PROCEDURE — 1123F ACP DISCUSS/DSCN MKR DOCD: CPT | Performed by: OTOLARYNGOLOGY

## 2023-10-11 PROCEDURE — G8484 FLU IMMUNIZE NO ADMIN: HCPCS | Performed by: OTOLARYNGOLOGY

## 2023-10-11 PROCEDURE — G8417 CALC BMI ABV UP PARAM F/U: HCPCS | Performed by: OTOLARYNGOLOGY

## 2023-10-11 PROCEDURE — 31237 NSL/SINS NDSC SURG BX POLYPC: CPT | Performed by: OTOLARYNGOLOGY

## 2023-10-11 PROCEDURE — 4004F PT TOBACCO SCREEN RCVD TLK: CPT | Performed by: OTOLARYNGOLOGY

## 2023-10-11 PROCEDURE — G8427 DOCREV CUR MEDS BY ELIG CLIN: HCPCS | Performed by: OTOLARYNGOLOGY

## 2023-10-11 ASSESSMENT — ENCOUNTER SYMPTOMS
SORE THROAT: 0
SHORTNESS OF BREATH: 0
ABDOMINAL PAIN: 0

## 2023-10-11 NOTE — PROGRESS NOTES
Chief Complaint   Patient presents with    Post-Op Check     Sinus surgery       HPI:  Renata Carver is a 68 y.o. male seen today now 1 week postop after undergoing bilateral FESS back on 10/4/2023. He had a large right-sided nasal polyp and significant inflammation within his right maxillary sinus. Overall, he is doing well with minimal pain and no further bleeding. He has been irrigating his nose/sinuses several times daily with salt water. He can breathe much better already from the right side. Past Medical History, Past Surgical History, Family history, Social History, and Medications were all reviewed with the patient today and updated as necessary.      No Known Allergies  Patient Active Problem List   Diagnosis    Coronary artery disease involving native coronary artery of native heart without angina pectoris    Benign non-nodular prostatic hyperplasia without lower urinary tract symptoms    Elevated PSA    Stage 3a chronic kidney disease (HCC)    Essential hypertension    Mixed hyperlipidemia    Major depressive disorder with single episode, in full remission ([address] )    Type 2 diabetes mellitus with stage 3a chronic kidney disease, without long-term current use of insulin ([address] [facility])    Encounter for long-term (current) use of medications    Vitamin D deficiency    History of cataract    History of MI (myocardial infarction)    Seasonal allergic rhinitis due to pollen    Chronic maxillary sinusitis    Nose polyp     Current Outpatient Medications   Medication Sig    Multiple Vitamins-Minerals (CENTRUM SILVER 50+MEN PO) Take by mouth daily    ondansetron (ZOFRAN-ODT) 4 MG disintegrating tablet Take 1 tablet by mouth 3 times daily as needed for Nausea or Vomiting    lisinopril (PRINIVIL;ZESTRIL) 20 MG tablet Take 1 tablet by mouth daily    fenofibrate (TRIGLIDE) 160 MG tablet Take 1 tablet by mouth daily    citalopram (CELEXA) 20 MG tablet Take 1 tablet by mouth daily    amLODIPine (NORVASC) 10 MG tablet

## 2023-11-13 ENCOUNTER — OFFICE VISIT (OUTPATIENT)
Dept: ENT CLINIC | Age: 73
End: 2023-11-13

## 2023-11-13 VITALS
WEIGHT: 200 LBS | HEIGHT: 72 IN | BODY MASS INDEX: 27.09 KG/M2 | DIASTOLIC BLOOD PRESSURE: 62 MMHG | SYSTOLIC BLOOD PRESSURE: 122 MMHG

## 2023-11-13 DIAGNOSIS — J32.0 CHRONIC MAXILLARY SINUSITIS: Primary | ICD-10-CM

## 2023-11-13 PROCEDURE — 99024 POSTOP FOLLOW-UP VISIT: CPT | Performed by: OTOLARYNGOLOGY

## 2023-11-13 ASSESSMENT — ENCOUNTER SYMPTOMS
WHEEZING: 0
COLOR CHANGE: 0
EYE PAIN: 0
VOMITING: 0
COUGH: 0
DIARRHEA: 0

## 2023-11-13 NOTE — PROGRESS NOTES
Take 1 tablet by mouth once daily    blood glucose test strips (ONETOUCH ULTRA) strip 1 each by In Vitro route daily DX: E11.22    traZODone (DESYREL) 50 MG tablet Take 1 tablet by mouth nightly Take 50 mg by mouth    metFORMIN (GLUCOPHAGE) 1000 MG tablet Take 1 tablet by mouth 2 times daily (with meals)    Lancets MISC Test blood sugar daily. Dx:E11.9    aspirin 81 MG EC tablet Take 1 tablet by mouth     No current facility-administered medications for this visit. Past Medical History:   Diagnosis Date    Acute ulcer of stomach     resolved/ 1990s? Anxiety and depression     Arthritis     BCC (basal cell carcinoma of skin)     BPH (benign prostatic hyperplasia)     CAD (coronary artery disease)     mi 1999-- cardiac stents x 2- last one placed 1999---- followed by dr. Tess Hay    Chronic maxillary sinusitis     Diabetes ([address])     type 2-- sqbs am avg 135-145    HLD (hyperlipidemia)     Controlled with meds     HTN (hypertension)     Controlled with meds     Melanoma ([address])     located on Head- surgical removal    MI (myocardial infarction) ([address])     1999    Sleep apnea     pt denies---    Smoker     1 ppd since age 22     Social History     Tobacco Use    Smoking status: Every Day     Packs/day: 1.00     Years: 50.00     Additional pack years: 0.00     Total pack years: 50.00     Types: Cigarettes     Start date: 1/1/1971    Smokeless tobacco: Never   Substance Use Topics    Alcohol use: No     Past Surgical History:   Procedure Laterality Date    CERVICAL FUSION  1990s    ACDF    COLONOSCOPY      HERNIA REPAIR Right 2018    inguinal    HERNIA REPAIR Left 09/12/2019    inguinal x 2    OTHER SURGICAL HISTORY  2014    [facility]    OTHER SURGICAL HISTORY  1967    Skin grafts.  r/t [address] [place]    stents x 2    SINUS ENDOSCOPY Bilateral 10/4/2023    SINUS ENDOSCOPY FUNCTIONAL SURGERY IMAGE GUIDED BILATERAL MAXILLARY ANTROSTOMY WITH MUCOSA REMOVAL AND TOTAL ETHMOIDECTOMY/ performed by

## 2023-11-21 ENCOUNTER — NURSE ONLY (OUTPATIENT)
Dept: PRIMARY CARE CLINIC | Facility: CLINIC | Age: 73
End: 2023-11-21

## 2023-11-21 DIAGNOSIS — N18.31 TYPE 2 DIABETES MELLITUS WITH STAGE 3A CHRONIC KIDNEY DISEASE, WITHOUT LONG-TERM CURRENT USE OF INSULIN (HCC): Primary | ICD-10-CM

## 2023-11-21 DIAGNOSIS — E11.22 TYPE 2 DIABETES MELLITUS WITH STAGE 3A CHRONIC KIDNEY DISEASE, WITHOUT LONG-TERM CURRENT USE OF INSULIN (HCC): Primary | ICD-10-CM

## 2023-11-21 DIAGNOSIS — E55.9 VITAMIN D DEFICIENCY: ICD-10-CM

## 2023-11-21 DIAGNOSIS — Z79.899 ENCOUNTER FOR LONG-TERM (CURRENT) USE OF MEDICATIONS: ICD-10-CM

## 2023-11-21 DIAGNOSIS — E78.2 MIXED HYPERLIPIDEMIA: ICD-10-CM

## 2023-11-21 LAB
25(OH)D3 SERPL-MCNC: 46.2 NG/ML (ref 30–100)
ALBUMIN SERPL-MCNC: 3.7 G/DL (ref 3.2–4.6)
ALBUMIN/GLOB SERPL: 1 (ref 0.4–1.6)
ALP SERPL-CCNC: 65 U/L (ref 50–136)
ALT SERPL-CCNC: 37 U/L (ref 12–65)
ANION GAP SERPL CALC-SCNC: 6 MMOL/L (ref 2–11)
AST SERPL-CCNC: 31 U/L (ref 15–37)
BASOPHILS # BLD: 0.1 K/UL (ref 0–0.2)
BASOPHILS NFR BLD: 1 % (ref 0–2)
BILIRUB SERPL-MCNC: 0.4 MG/DL (ref 0.2–1.1)
BUN SERPL-MCNC: 14 MG/DL (ref 8–23)
CALCIUM SERPL-MCNC: 10.3 MG/DL (ref 8.3–10.4)
CHLORIDE SERPL-SCNC: 108 MMOL/L (ref 101–110)
CHOLEST SERPL-MCNC: 126 MG/DL
CO2 SERPL-SCNC: 25 MMOL/L (ref 21–32)
CREAT SERPL-MCNC: 1.2 MG/DL (ref 0.8–1.5)
CREAT UR-MCNC: 246 MG/DL
DIFFERENTIAL METHOD BLD: ABNORMAL
EOSINOPHIL # BLD: 0.1 K/UL (ref 0–0.8)
EOSINOPHIL NFR BLD: 2 % (ref 0.5–7.8)
ERYTHROCYTE [DISTWIDTH] IN BLOOD BY AUTOMATED COUNT: 13.4 % (ref 11.9–14.6)
GLOBULIN SER CALC-MCNC: 3.6 G/DL (ref 2.8–4.5)
GLUCOSE SERPL-MCNC: 194 MG/DL (ref 65–100)
HCT VFR BLD AUTO: 38.1 % (ref 41.1–50.3)
HDLC SERPL-MCNC: 24 MG/DL (ref 40–60)
HDLC SERPL: 5.3
HGB BLD-MCNC: 12.3 G/DL (ref 13.6–17.2)
IMM GRANULOCYTES # BLD AUTO: 0.1 K/UL (ref 0–0.5)
IMM GRANULOCYTES NFR BLD AUTO: 2 % (ref 0–5)
LDLC SERPL CALC-MCNC: 68.4 MG/DL
LYMPHOCYTES # BLD: 2.3 K/UL (ref 0.5–4.6)
LYMPHOCYTES NFR BLD: 31 % (ref 13–44)
MCH RBC QN AUTO: 29.9 PG (ref 26.1–32.9)
MCHC RBC AUTO-ENTMCNC: 32.3 G/DL (ref 31.4–35)
MCV RBC AUTO: 92.7 FL (ref 82–102)
MICROALBUMIN UR-MCNC: 37.4 MG/DL
MICROALBUMIN/CREAT UR-RTO: 152 MG/G (ref 0–30)
MONOCYTES # BLD: 0.3 K/UL (ref 0.1–1.3)
MONOCYTES NFR BLD: 5 % (ref 4–12)
NEUTS SEG # BLD: 4.4 K/UL (ref 1.7–8.2)
NEUTS SEG NFR BLD: 59 % (ref 43–78)
NRBC # BLD: 0 K/UL (ref 0–0.2)
PLATELET # BLD AUTO: 243 K/UL (ref 150–450)
PMV BLD AUTO: 10.3 FL (ref 9.4–12.3)
POTASSIUM SERPL-SCNC: 4.2 MMOL/L (ref 3.5–5.1)
PROT SERPL-MCNC: 7.3 G/DL (ref 6.3–8.2)
RBC # BLD AUTO: 4.11 M/UL (ref 4.23–5.6)
SODIUM SERPL-SCNC: 139 MMOL/L (ref 133–143)
TRIGL SERPL-MCNC: 168 MG/DL (ref 35–150)
VLDLC SERPL CALC-MCNC: 33.6 MG/DL (ref 6–23)
WBC # BLD AUTO: 7.3 K/UL (ref 4.3–11.1)

## 2023-11-22 LAB
EST. AVERAGE GLUCOSE BLD GHB EST-MCNC: 189 MG/DL
HBA1C MFR BLD: 8.2 % (ref 4.8–5.6)

## 2023-11-24 DIAGNOSIS — E11.22 TYPE 2 DIABETES MELLITUS WITH STAGE 3A CHRONIC KIDNEY DISEASE, WITHOUT LONG-TERM CURRENT USE OF INSULIN (HCC): ICD-10-CM

## 2023-11-24 DIAGNOSIS — N18.31 TYPE 2 DIABETES MELLITUS WITH STAGE 3A CHRONIC KIDNEY DISEASE, WITHOUT LONG-TERM CURRENT USE OF INSULIN (HCC): ICD-10-CM

## 2023-11-28 ENCOUNTER — OFFICE VISIT (OUTPATIENT)
Dept: PRIMARY CARE CLINIC | Facility: CLINIC | Age: 73
End: 2023-11-28
Payer: MEDICARE

## 2023-11-28 VITALS
WEIGHT: 201.8 LBS | HEIGHT: 72 IN | SYSTOLIC BLOOD PRESSURE: 127 MMHG | TEMPERATURE: 97.8 F | BODY MASS INDEX: 27.33 KG/M2 | HEART RATE: 96 BPM | OXYGEN SATURATION: 96 % | DIASTOLIC BLOOD PRESSURE: 66 MMHG

## 2023-11-28 DIAGNOSIS — I10 PRIMARY HYPERTENSION: ICD-10-CM

## 2023-11-28 DIAGNOSIS — F51.01 PRIMARY INSOMNIA: ICD-10-CM

## 2023-11-28 DIAGNOSIS — R09.82 POST-NASAL DRAINAGE: ICD-10-CM

## 2023-11-28 DIAGNOSIS — Z79.899 ENCOUNTER FOR LONG-TERM (CURRENT) USE OF MEDICATIONS: ICD-10-CM

## 2023-11-28 DIAGNOSIS — N18.31 TYPE 2 DIABETES MELLITUS WITH STAGE 3A CHRONIC KIDNEY DISEASE, WITHOUT LONG-TERM CURRENT USE OF INSULIN (HCC): Primary | ICD-10-CM

## 2023-11-28 DIAGNOSIS — Z12.11 SCREEN FOR COLON CANCER: ICD-10-CM

## 2023-11-28 DIAGNOSIS — J30.1 SEASONAL ALLERGIC RHINITIS DUE TO POLLEN: ICD-10-CM

## 2023-11-28 DIAGNOSIS — E78.2 MIXED HYPERLIPIDEMIA: ICD-10-CM

## 2023-11-28 DIAGNOSIS — E11.22 TYPE 2 DIABETES MELLITUS WITH STAGE 3A CHRONIC KIDNEY DISEASE, WITHOUT LONG-TERM CURRENT USE OF INSULIN (HCC): Primary | ICD-10-CM

## 2023-11-28 DIAGNOSIS — R05.3 CHRONIC COUGH: ICD-10-CM

## 2023-11-28 PROCEDURE — G8427 DOCREV CUR MEDS BY ELIG CLIN: HCPCS | Performed by: FAMILY MEDICINE

## 2023-11-28 PROCEDURE — G8417 CALC BMI ABV UP PARAM F/U: HCPCS | Performed by: FAMILY MEDICINE

## 2023-11-28 PROCEDURE — 3074F SYST BP LT 130 MM HG: CPT | Performed by: FAMILY MEDICINE

## 2023-11-28 PROCEDURE — 3078F DIAST BP <80 MM HG: CPT | Performed by: FAMILY MEDICINE

## 2023-11-28 PROCEDURE — 99213 OFFICE O/P EST LOW 20 MIN: CPT | Performed by: FAMILY MEDICINE

## 2023-11-28 PROCEDURE — 4004F PT TOBACCO SCREEN RCVD TLK: CPT | Performed by: FAMILY MEDICINE

## 2023-11-28 PROCEDURE — G8484 FLU IMMUNIZE NO ADMIN: HCPCS | Performed by: FAMILY MEDICINE

## 2023-11-28 PROCEDURE — 3017F COLORECTAL CA SCREEN DOC REV: CPT | Performed by: FAMILY MEDICINE

## 2023-11-28 PROCEDURE — 2022F DILAT RTA XM EVC RTNOPTHY: CPT | Performed by: FAMILY MEDICINE

## 2023-11-28 PROCEDURE — 1123F ACP DISCUSS/DSCN MKR DOCD: CPT | Performed by: FAMILY MEDICINE

## 2023-11-28 PROCEDURE — 3052F HG A1C>EQUAL 8.0%<EQUAL 9.0%: CPT | Performed by: FAMILY MEDICINE

## 2023-11-28 RX ORDER — SILDENAFIL CITRATE 20 MG/1
20 TABLET ORAL PRN
COMMUNITY
End: 2023-11-28 | Stop reason: SDUPTHER

## 2023-11-28 RX ORDER — BLOOD SUGAR DIAGNOSTIC
1 STRIP MISCELLANEOUS DAILY
Qty: 100 EACH | Refills: 5 | Status: SHIPPED | OUTPATIENT
Start: 2023-11-28

## 2023-11-28 RX ORDER — SILDENAFIL CITRATE 20 MG/1
20 TABLET ORAL PRN
Qty: 90 TABLET | Refills: 3 | Status: SHIPPED | OUTPATIENT
Start: 2023-11-28

## 2023-11-28 RX ORDER — ATORVASTATIN CALCIUM 80 MG/1
80 TABLET, FILM COATED ORAL DAILY
COMMUNITY
Start: 2023-11-24 | End: 2023-11-28 | Stop reason: SDUPTHER

## 2023-11-28 RX ORDER — MONTELUKAST SODIUM 10 MG/1
10 TABLET ORAL DAILY
Qty: 90 TABLET | Refills: 3 | Status: SHIPPED | OUTPATIENT
Start: 2023-11-28

## 2023-11-28 RX ORDER — ATORVASTATIN CALCIUM 80 MG/1
80 TABLET, FILM COATED ORAL DAILY
Qty: 90 TABLET | Refills: 3 | Status: SHIPPED | OUTPATIENT
Start: 2023-11-28

## 2023-11-28 RX ORDER — TRAZODONE HYDROCHLORIDE 50 MG/1
50 TABLET ORAL NIGHTLY
Qty: 90 TABLET | Refills: 3 | Status: SHIPPED | OUTPATIENT
Start: 2023-11-28

## 2023-11-28 RX ORDER — AMLODIPINE BESYLATE 10 MG/1
TABLET ORAL
Qty: 90 TABLET | Refills: 3 | Status: SHIPPED | OUTPATIENT
Start: 2023-11-28

## 2023-11-28 NOTE — PROGRESS NOTES
Chuck Mendez M.D.  3 [facility], [address] [place] [address]  Phone:  [phone]  Fax:  [phone]:  Chief Complaint   Patient presents with    Results     Patient recently had lab work done, would like to discuss results       Cough     Patient states he has had a cough for about 2 months. He relates this to a sinus surgery he had around the same time. He states he still get a lot of drainage in the back of this throat. Diabetes     Patient states blood sugars have been good. Patient checks her blood sugar once per day. Patient is compliant with medication use. Patient denies symptoms of neuropathy. Patient denies wounds that won't heal.       Hypertension     Patient does check blood pressure at home, reports their blood pressure has been good. Patient takes medications as prescribed. Denies chest pain, shortness of breath or headaches. HISTORY OF PRESENT ILLNESS:  Mr. Jonathan Howard is a 68 y.o. male  who presents for follow-up. Patient states that he has been coughing and has postnasal drainage for the past few weeks. He recently had sinus surgery. He thinks he might of also had COVID recently. His ENT told him to try over-the-counter nasal steroids. He has been trying meds which is helping some. He denies any fever or chills. He also recently had cataract surgery. His night vision has greatly improved. Patient states that he had 3 surgeries within a month. His blood sugars have been fluctuating. In the mornings they may range from 100-200 but then in the evening they range between 117 and 120. He takes his medications daily as prescribed. His last A1c increased up to 8.2. No other complaints. Taking medications as prescribed. Medications reviewed and updated. HISTORY:  No Known Allergies      REVIEW OF SYSTEMS:  Review of systems is as indicated in HPI otherwise negative.     PHYSICAL EXAM:  Vital Signs -   Visit

## 2023-12-27 RX ORDER — LANCETS 33 GAUGE
EACH MISCELLANEOUS
Qty: 100 EACH | Refills: 0 | OUTPATIENT
Start: 2023-12-27

## 2023-12-29 RX ORDER — LANCETS 33 GAUGE
EACH MISCELLANEOUS
Qty: 100 EACH | Refills: 0 | OUTPATIENT
Start: 2023-12-29

## 2024-01-05 ENCOUNTER — PREP FOR PROCEDURE (OUTPATIENT)
Dept: GASTROENTEROLOGY | Age: 74
End: 2024-01-05

## 2024-01-05 ENCOUNTER — TELEPHONE (OUTPATIENT)
Dept: GASTROENTEROLOGY | Age: 74
End: 2024-01-05

## 2024-01-05 DIAGNOSIS — Z12.11 SCREENING FOR COLON CANCER: ICD-10-CM

## 2024-01-06 RX ORDER — SODIUM CHLORIDE 9 MG/ML
25 INJECTION, SOLUTION INTRAVENOUS PRN
Status: CANCELLED | OUTPATIENT
Start: 2024-01-06

## 2024-01-06 RX ORDER — SODIUM CHLORIDE 0.9 % (FLUSH) 0.9 %
5-40 SYRINGE (ML) INJECTION PRN
Status: CANCELLED | OUTPATIENT
Start: 2024-01-06

## 2024-01-06 RX ORDER — SODIUM CHLORIDE 0.9 % (FLUSH) 0.9 %
5-40 SYRINGE (ML) INJECTION EVERY 12 HOURS SCHEDULED
Status: CANCELLED | OUTPATIENT
Start: 2024-01-06

## 2024-01-08 RX ORDER — LANCETS 33 GAUGE
EACH MISCELLANEOUS
Qty: 100 EACH | Refills: 0 | OUTPATIENT
Start: 2024-01-08

## 2024-01-17 NOTE — PERIOP NOTE
Patient verified name, , and procedure.    Type: 1a; abbreviated assessment per anesthesia guidelines    Labs per anesthesia: SQBS DOS    Instructed pt that they will be notified the day before their procedure by the GI Lab for time of arrival if their procedure is [place] and Pre-op for [place] cases. Arrival times should be called by 5 pm. If no phone is received the patient should contact their respective hospital. The GI lab telephone number is [phone] and ES Pre-op is [phone].     Follow diet and prep instructions per office including NPO status.  If patient has NOT received instructions from office patient is advised to call surgeon office, verbalizes understanding.    Bath or shower the night before and the am of surgery with non-moisturizing soap. No lotions, oils, powders, cologne on skin. No make up, eye make up or jewelry. Wear loose fitting comfortable, clean clothing.     Must have adult present in building the entire time .     Medications for the day of procedure Citalopram (Celexa), Atorvastatin (Lipitor), Fenofibrate (Tricor), Amlodipine (Norvasc), , patient to hold none per anesthesia guidelines.     The following discharge instructions reviewed with patient: medication given during procedure may cause drowsiness for several hours, therefore, do not drive or operate machinery for remainder of the day. You may not drink alcohol on the day of your procedure, please resume regular diet and activity unless otherwise directed. You may experience abdominal distention for several hours that is relieved by the passage of gas. Contact your physician if you have any of the following: fever or chills, severe abdominal pain or excessive amount of bleeding or a large amount when having a bowel movement. Occasional specks of blood with bowel movement would not be unusual.

## 2024-01-19 ENCOUNTER — ANESTHESIA EVENT (OUTPATIENT)
Dept: ENDOSCOPY | Age: 74
End: 2024-01-19
Payer: MEDICARE

## 2024-01-19 ENCOUNTER — HOSPITAL ENCOUNTER (OUTPATIENT)
Age: 74
Setting detail: OUTPATIENT SURGERY
Discharge: HOME | End: 2024-01-19
Attending: STUDENT IN AN ORGANIZED HEALTH CARE EDUCATION/TRAINING PROGRAM | Admitting: STUDENT IN AN ORGANIZED HEALTH CARE EDUCATION/TRAINING PROGRAM
Payer: MEDICARE

## 2024-01-19 ENCOUNTER — ANESTHESIA (OUTPATIENT)
Dept: ENDOSCOPY | Age: 74
End: 2024-01-19
Payer: MEDICARE

## 2024-01-19 VITALS
TEMPERATURE: 98.6 F | RESPIRATION RATE: 16 BRPM | DIASTOLIC BLOOD PRESSURE: 63 MMHG | HEART RATE: 81 BPM | HEIGHT: 72 IN | BODY MASS INDEX: 27.09 KG/M2 | OXYGEN SATURATION: 96 % | WEIGHT: 200 LBS | SYSTOLIC BLOOD PRESSURE: 124 MMHG

## 2024-01-19 LAB
GLUCOSE BLD STRIP.AUTO-MCNC: 152 MG/DL (ref 65–100)
SERVICE CMNT-IMP: ABNORMAL

## 2024-01-19 PROCEDURE — 6360000002 HC RX W HCPCS: Performed by: NURSE ANESTHETIST, CERTIFIED REGISTERED

## 2024-01-19 PROCEDURE — 2500000003 HC RX 250 WO HCPCS: Performed by: NURSE ANESTHETIST, CERTIFIED REGISTERED

## 2024-01-19 PROCEDURE — 3700000001 HC ADD 15 MINUTES (ANESTHESIA): Performed by: STUDENT IN AN ORGANIZED HEALTH CARE EDUCATION/TRAINING PROGRAM

## 2024-01-19 PROCEDURE — 82962 GLUCOSE BLOOD TEST: CPT

## 2024-01-19 PROCEDURE — 7100000010 HC PHASE II RECOVERY - FIRST 15 MIN: Performed by: STUDENT IN AN ORGANIZED HEALTH CARE EDUCATION/TRAINING PROGRAM

## 2024-01-19 PROCEDURE — 3609027000 HC COLONOSCOPY: Performed by: STUDENT IN AN ORGANIZED HEALTH CARE EDUCATION/TRAINING PROGRAM

## 2024-01-19 PROCEDURE — 3700000000 HC ANESTHESIA ATTENDED CARE: Performed by: STUDENT IN AN ORGANIZED HEALTH CARE EDUCATION/TRAINING PROGRAM

## 2024-01-19 PROCEDURE — 2580000003 HC RX 258: Performed by: ANESTHESIOLOGY

## 2024-01-19 PROCEDURE — 7100000011 HC PHASE II RECOVERY - ADDTL 15 MIN: Performed by: STUDENT IN AN ORGANIZED HEALTH CARE EDUCATION/TRAINING PROGRAM

## 2024-01-19 PROCEDURE — 6370000000 HC RX 637 (ALT 250 FOR IP): Performed by: ANESTHESIOLOGY

## 2024-01-19 PROCEDURE — 2709999900 HC NON-CHARGEABLE SUPPLY: Performed by: STUDENT IN AN ORGANIZED HEALTH CARE EDUCATION/TRAINING PROGRAM

## 2024-01-19 RX ORDER — SODIUM CHLORIDE 0.9 % (FLUSH) 0.9 %
5-40 SYRINGE (ML) INJECTION PRN
Status: DISCONTINUED | OUTPATIENT
Start: 2024-01-19 | End: 2024-01-19 | Stop reason: HOSPADM

## 2024-01-19 RX ORDER — PROPOFOL 10 MG/ML
INJECTION, EMULSION INTRAVENOUS PRN
Status: DISCONTINUED | OUTPATIENT
Start: 2024-01-19 | End: 2024-01-19 | Stop reason: SDUPTHER

## 2024-01-19 RX ORDER — SODIUM CHLORIDE 0.9 % (FLUSH) 0.9 %
5-40 SYRINGE (ML) INJECTION EVERY 12 HOURS SCHEDULED
Status: DISCONTINUED | OUTPATIENT
Start: 2024-01-19 | End: 2024-01-19 | Stop reason: HOSPADM

## 2024-01-19 RX ORDER — LIDOCAINE HYDROCHLORIDE 20 MG/ML
INJECTION, SOLUTION EPIDURAL; INFILTRATION; INTRACAUDAL; PERINEURAL PRN
Status: DISCONTINUED | OUTPATIENT
Start: 2024-01-19 | End: 2024-01-19 | Stop reason: SDUPTHER

## 2024-01-19 RX ORDER — SODIUM CHLORIDE 9 MG/ML
INJECTION, SOLUTION INTRAVENOUS PRN
Status: DISCONTINUED | OUTPATIENT
Start: 2024-01-19 | End: 2024-01-19 | Stop reason: HOSPADM

## 2024-01-19 RX ORDER — SODIUM CHLORIDE 9 MG/ML
25 INJECTION, SOLUTION INTRAVENOUS PRN
Status: DISCONTINUED | OUTPATIENT
Start: 2024-01-19 | End: 2024-01-19 | Stop reason: HOSPADM

## 2024-01-19 RX ORDER — SODIUM CHLORIDE, SODIUM LACTATE, POTASSIUM CHLORIDE, CALCIUM CHLORIDE 600; 310; 30; 20 MG/100ML; MG/100ML; MG/100ML; MG/100ML
INJECTION, SOLUTION INTRAVENOUS CONTINUOUS
Status: DISCONTINUED | OUTPATIENT
Start: 2024-01-19 | End: 2024-01-19 | Stop reason: HOSPADM

## 2024-01-19 RX ORDER — LIDOCAINE HYDROCHLORIDE 10 MG/ML
1 INJECTION, SOLUTION INFILTRATION; PERINEURAL
Status: DISCONTINUED | OUTPATIENT
Start: 2024-01-19 | End: 2024-01-19 | Stop reason: HOSPADM

## 2024-01-19 RX ORDER — ASPIRIN 81 MG/1
81 TABLET, CHEWABLE ORAL DAILY
Status: DISCONTINUED | OUTPATIENT
Start: 2024-01-19 | End: 2024-01-19 | Stop reason: HOSPADM

## 2024-01-19 RX ADMIN — LIDOCAINE HYDROCHLORIDE 50 MG: 20 INJECTION, SOLUTION EPIDURAL; INFILTRATION; INTRACAUDAL; PERINEURAL at 10:23

## 2024-01-19 RX ADMIN — ASPIRIN 81 MG: 81 TABLET, CHEWABLE ORAL at 09:53

## 2024-01-19 RX ADMIN — SODIUM CHLORIDE, POTASSIUM CHLORIDE, SODIUM LACTATE AND CALCIUM CHLORIDE: 600; 310; 30; 20 INJECTION, SOLUTION INTRAVENOUS at 09:53

## 2024-01-19 RX ADMIN — PROPOFOL 50 MG: 10 INJECTION, EMULSION INTRAVENOUS at 10:25

## 2024-01-19 RX ADMIN — PROPOFOL 50 MG: 10 INJECTION, EMULSION INTRAVENOUS at 10:23

## 2024-01-19 RX ADMIN — PROPOFOL 140 MCG/KG/MIN: 10 INJECTION, EMULSION INTRAVENOUS at 10:24

## 2024-01-19 ASSESSMENT — PAIN - FUNCTIONAL ASSESSMENT
PAIN_FUNCTIONAL_ASSESSMENT: 0-10

## 2024-01-19 NOTE — ANESTHESIA POSTPROCEDURE EVALUATION
Department of Anesthesiology  Postprocedure Note    Patient: Tommy Granger  MRN: [id number]  YOB: 1950  Date of evaluation: 1/19/2024    Procedure Summary       Date: 01/19/24 Room / Location: [facility] ENDO 05 / [facility] ENDOSCOPY    Anesthesia Start: 1019 Anesthesia Stop: 1043    Procedure: COLORECTAL CANCER SCREENING, NOT HIGH RISK/ Polypectomy (Bilateral) Diagnosis:       Screening for colon cancer      (Screening for colon cancer [Z12.11])    Surgeons: Chapito Griffith MD Responsible Provider: Brown Akers MD    Anesthesia Type: MAC ASA Status: 3            Anesthesia Type: MAC    Bridger Phase I: Bridger Score: 10    Bridger Phase II: Bridger Score: 8    Anesthesia Post Evaluation    Patient location during evaluation: PACU  Patient participation: complete - patient participated  Level of consciousness: awake and alert  Airway patency: patent  Nausea & Vomiting: no nausea and no vomiting  Cardiovascular status: hemodynamically stable  Respiratory status: acceptable, nonlabored ventilation and spontaneous ventilation  Hydration status: euvolemic  Comments: BP (!) 93/56   Pulse 93   Temp 98.6 °F (37 °C) (Skin)   Resp 14   Ht 1.829 m (6')   Wt 90.7 kg (200 lb)   SpO2 98%   BMI 27.12 kg/m²     Multimodal analgesia pain management approach  Pain management: adequate and satisfactory to patient    No notable events documented.

## 2024-01-19 NOTE — PROGRESS NOTES
VSS at discharge. No complaints noted. Education reviewed with patient and his wife. Pt discharged via wheelchair by KEITH Palumbo. Patient to be driven home by his wife.

## 2024-01-19 NOTE — ANESTHESIA PRE PROCEDURE
Department of Anesthesiology  Preprocedure Note       Name:  Tommy Granger   Age:  73 y.o.  :  1950                                          MRN:  [id number]         Date:  2024      Surgeon: Surgeon(s):  Chapito Griffith MD    Procedure: Procedure(s):  COLORECTAL CANCER SCREENING, NOT HIGH RISK    Medications prior to admission:   Prior to Admission medications    Medication Sig Start Date End Date Taking? Authorizing Provider   amLODIPine (NORVASC) 10 MG tablet Take 1 tablet by mouth once daily 23   Rosemarie Bravo MD   blood glucose test strips (ONETOUCH ULTRA) strip 1 each by In Vitro route daily DX: E11.22 23   Rosemarie Bravo MD   sildenafil (REVATIO) 20 MG tablet Take 1 tablet by mouth as needed (take 1 hour before intercourse) 23   Rosemarie Bravo MD   atorvastatin (LIPITOR) 80 MG tablet Take 1 tablet by mouth daily 23   Rosemarie Bravo MD   traZODone (DESYREL) 50 MG tablet Take 1 tablet by mouth nightly Take 50 mg by mouth 23   Rosemarie Bravo MD   metFORMIN (GLUCOPHAGE) 1000 MG tablet Take 1 tablet by mouth 2 times daily (with meals) 23   Rosemarie Bravo MD   montelukast (SINGULAIR) 10 MG tablet Take 1 tablet by mouth daily For post nasal drainage 23   Rosemarie Bravo MD   empagliflozin (JARDIANCE) 10 MG tablet Take 1 tablet by mouth daily For diabetes 23   Rosemarie Bravo MD   Multiple Vitamins-Minerals (CENTRUM SILVER 50+MEN PO) Take by mouth daily    Provider, MD Haritha   ondansetron (ZOFRAN-ODT) 4 MG disintegrating tablet Take 1 tablet by mouth 3 times daily as needed for Nausea or Vomiting  Patient not taking: Reported on 2023   Aristeo Alba MD   lisinopril (PRINIVIL;ZESTRIL) 20 MG tablet Take 1 tablet by mouth daily 23   Rosemarie Bravo MD   fenofibrate (TRIGLIDE) 160 MG tablet Take 1 tablet by mouth daily 23

## 2024-01-19 NOTE — H&P
Tommy Granger is 73 y.o. y/o male here for screening colonoscopy.    No FH of colon cancer, no acute symptoms.     Past Medical History:   Diagnosis Date    Acute ulcer of stomach     resolved/ 1990s?    Anxiety and depression     Arthritis     BCC (basal cell carcinoma of skin)     BPH (benign prostatic hyperplasia)     CAD (coronary artery disease)     mi 1999-- cardiac stents x 2- last one placed 1999---- followed by dr. sy    Chronic maxillary sinusitis     Diabetes (HCC)     type 2-- sqbs am avg 135-145    HLD (hyperlipidemia)     Controlled with meds     HTN (hypertension)     Controlled with meds     Melanoma (HCC)     located on Head- surgical removal    MI (myocardial infarction) (HCC)     1999    Sleep apnea     pt denies---    Smoker     1 ppd since age 21     Past Surgical History:   Procedure Laterality Date    CATARACT EXTRACTION, BILATERAL      CERVICAL FUSION  1990s    ACDF    COLONOSCOPY      HERNIA REPAIR Right 2018    inguinal    HERNIA REPAIR Left 09/12/2019    inguinal x 2    OTHER SURGICAL HISTORY  2014    [facility]    OTHER SURGICAL HISTORY  1967    Skin grafts. r/t [facility]    [place] UNLISTED PROCEDURE CARDIAC SURGERY  1999    stents x 2    SINUS ENDOSCOPY Bilateral 10/04/2023    SINUS ENDOSCOPY FUNCTIONAL SURGERY IMAGE GUIDED BILATERAL MAXILLARY ANTROSTOMY WITH MUCOSA REMOVAL AND TOTAL ETHMOIDECTOMY/ performed by Aristeo Alba MD at [facility]    SINUS SURGERY Bilateral 10/04/2023    FESS- [place]    TURP  01/13/2022    UROLOGICAL SURGERY  10/01/2020    urolift     Family History   Problem Relation Age of Onset    Hypertension Mother     Stroke Mother         several TIAs    Diabetes Father 85        type 2    Heart Attack Father     Hypertension Father     Heart Disease Father     Heart Attack Other     Heart Disease Other     Cancer Neg Hx      Social History     Tobacco Use    Smoking status: Every Day     Current packs/day: 1.00     Average packs/day: 1 pack/day for 53.0 years (53.0 ttl pk-yrs)

## 2024-01-19 NOTE — DISCHARGE INSTRUCTIONS
Gastrointestinal Colonoscopy/Flexible Sigmoidoscopy - Lower Exam Discharge Instructions  Call Dr. Griffith at  for any problems or questions.  Contact the doctor’s office for follow up appointment as directed  Medication may cause drowsiness for several hours, therefore, do not drive or operate machinery for remainder of the day.  No alcohol today.  Do not make any important decisions such signing legal paperwork.  Ordinarily, you may resume regular diet and activity after exam unless otherwise specified by your physician.  Because of air put into your colon during exam, you may experience some abdominal distension, relieved by the passage of gas, for several hours.  Contact your physician if you have any of the following:  Excessive amount of bleeding - large amount when having a bowel movement.  Occasional specks of blood with bowel movement would not be unusual.  Severe abdominal pain  Fever or Chills      Any additional instructions:           Instructions given to Tommy Granger and other family members.

## 2024-01-19 NOTE — OP NOTE
Operative Report    Patient: Tommy Granger MRN: [id number]      YOB: 1950  Age: 73 y.o.  Sex: male            Indications:  Screening    Preoperative Evaluation: The patient was evaluated prior to the procedure in the GI lab admission area, the patient ASA was recorded .  Consent was obtained from the patient with the risk of perforation bleeding and aspiration.    Anesthesia: JUDIT-per anesthesia    Complications: None; patient tolerated the procedure well.    EBL -insignificant      Procedure: The patient was sedated in the left lateral decubitus position.  Scope was advanced from the rectum to the cecum.  Evaluation was performed to the cecum twice.  The scope was withdrawn to the rectum, retroflexed view was performed.  The rectal exam was normal.  Preparation was inadequate [place] score of (1+2+3)6 .    Findings:    Semisolid stool in the cecum, unable to rule out small polyps in the cecum.  1 polyp in the ascending colon, 4 mm in size, removed via cold snare polypectomy. Not retrieved.   Mild sigmoid diverticulosis.  Internal hemorrhoids.    Postoperative Diagnosis:   One polyp.  Sigmoid diverticulosis.   Internal hemorrhoids.     Recommendations:   Interval of the next colonoscopy will be determined by pending pathology, likely 5 years, discuss risk vs benefits of repeat colonoscopy at that time)    Signed By:  Chapito Griffith MD     January 19, 2024

## 2024-02-04 PROBLEM — Z12.11 SCREENING FOR COLON CANCER: Status: RESOLVED | Noted: 2024-01-05 | Resolved: 2024-02-04

## 2024-02-06 ENCOUNTER — OFFICE VISIT (OUTPATIENT)
Age: 74
End: 2024-02-06

## 2024-02-06 VITALS
HEART RATE: 90 BPM | DIASTOLIC BLOOD PRESSURE: 60 MMHG | WEIGHT: 190 LBS | HEIGHT: 72 IN | BODY MASS INDEX: 25.73 KG/M2 | SYSTOLIC BLOOD PRESSURE: 138 MMHG

## 2024-02-06 DIAGNOSIS — E78.2 MIXED HYPERLIPIDEMIA: ICD-10-CM

## 2024-02-06 DIAGNOSIS — N18.31 STAGE 3A CHRONIC KIDNEY DISEASE (HCC): ICD-10-CM

## 2024-02-06 DIAGNOSIS — I10 PRIMARY HYPERTENSION: ICD-10-CM

## 2024-02-06 DIAGNOSIS — I10 ESSENTIAL HYPERTENSION: Primary | ICD-10-CM

## 2024-02-06 DIAGNOSIS — I25.10 CORONARY ARTERY DISEASE INVOLVING NATIVE CORONARY ARTERY OF NATIVE HEART WITHOUT ANGINA PECTORIS: ICD-10-CM

## 2024-02-06 RX ORDER — AMOXICILLIN 500 MG/1
500 CAPSULE ORAL 3 TIMES DAILY
COMMUNITY
Start: 2024-01-31

## 2024-02-06 NOTE — PROGRESS NOTES
2 [facility], [address] [place] [address]  PHONE: [phone]     24    NAME:  Tommy Granger  : 1950  MRN: [id number]       SUBJECTIVE:   Tommy Granger is a 73 y.o. male seen for a follow up visit regarding the following:     Chief Complaint   Patient presents with    Coronary Artery Disease       HPI: He has CAD, prior PCI yrs and yrs ago in [place].    Echo 2023: normal EF  NST 2023:  low risk of myocardial ischemia.        No CP, angina, playing golf.  Her mother bought new home.  Active, no angina.  No palp, edema.   Feeling well now, no angina.   Patient denies recent history of orthopnea, PND, excessive dizziness and/or syncope.        Ofelia Granger, his mother, is my patients.              Past Medical History, Past Surgical History, Family history, Social History, and Medications were all reviewed with the patient today and updated as necessary.     Current Outpatient Medications   Medication Sig Dispense Refill    amoxicillin (AMOXIL) 500 MG capsule Take 1 capsule by mouth 3 times daily      amLODIPine (NORVASC) 10 MG tablet Take 1 tablet by mouth once daily 90 tablet 3    blood glucose test strips (ONETOUCH ULTRA) strip 1 each by In Vitro route daily DX: E11.22 100 each 5    sildenafil (REVATIO) 20 MG tablet Take 1 tablet by mouth as needed (take 1 hour before intercourse) 90 tablet 3    atorvastatin (LIPITOR) 80 MG tablet Take 1 tablet by mouth daily 90 tablet 3    traZODone (DESYREL) 50 MG tablet Take 1 tablet by mouth nightly Take 50 mg by mouth 90 tablet 3    metFORMIN (GLUCOPHAGE) 1000 MG tablet Take 1 tablet by mouth 2 times daily (with meals) 180 tablet 3    montelukast (SINGULAIR) 10 MG tablet Take 1 tablet by mouth daily For post nasal drainage 90 tablet 3    empagliflozin (JARDIANCE) 10 MG tablet Take 1 tablet by mouth daily For diabetes 90 tablet 1    Multiple Vitamins-Minerals (CENTRUM SILVER 50+MEN PO) Take by mouth daily      lisinopril

## 2024-03-26 ENCOUNTER — NURSE ONLY (OUTPATIENT)
Dept: PRIMARY CARE CLINIC | Facility: CLINIC | Age: 74
End: 2024-03-26

## 2024-03-26 DIAGNOSIS — R97.20 ELEVATED PSA: ICD-10-CM

## 2024-03-26 DIAGNOSIS — E11.22 TYPE 2 DIABETES MELLITUS WITH STAGE 3A CHRONIC KIDNEY DISEASE, WITHOUT LONG-TERM CURRENT USE OF INSULIN (HCC): Primary | ICD-10-CM

## 2024-03-26 DIAGNOSIS — Z79.899 ENCOUNTER FOR LONG-TERM (CURRENT) USE OF MEDICATIONS: ICD-10-CM

## 2024-03-26 DIAGNOSIS — E55.9 VITAMIN D DEFICIENCY: ICD-10-CM

## 2024-03-26 DIAGNOSIS — N18.31 TYPE 2 DIABETES MELLITUS WITH STAGE 3A CHRONIC KIDNEY DISEASE, WITHOUT LONG-TERM CURRENT USE OF INSULIN (HCC): Primary | ICD-10-CM

## 2024-03-26 DIAGNOSIS — E78.2 MIXED HYPERLIPIDEMIA: ICD-10-CM

## 2024-03-26 LAB
25(OH)D3 SERPL-MCNC: 37.7 NG/ML (ref 30–100)
ALBUMIN SERPL-MCNC: 4.1 G/DL (ref 3.2–4.6)
ALBUMIN/GLOB SERPL: 1.3 (ref 0.4–1.6)
ALP SERPL-CCNC: 47 U/L (ref 50–136)
ALT SERPL-CCNC: 31 U/L (ref 12–65)
ANION GAP SERPL CALC-SCNC: 3 MMOL/L (ref 2–11)
AST SERPL-CCNC: 27 U/L (ref 15–37)
BASOPHILS # BLD: 0.1 K/UL (ref 0–0.2)
BASOPHILS NFR BLD: 1 % (ref 0–2)
BILIRUB SERPL-MCNC: 0.5 MG/DL (ref 0.2–1.1)
BUN SERPL-MCNC: 18 MG/DL (ref 8–23)
CALCIUM SERPL-MCNC: 10.7 MG/DL (ref 8.3–10.4)
CHLORIDE SERPL-SCNC: 107 MMOL/L (ref 103–113)
CHOLEST SERPL-MCNC: 130 MG/DL
CO2 SERPL-SCNC: 26 MMOL/L (ref 21–32)
CREAT SERPL-MCNC: 1.4 MG/DL (ref 0.8–1.5)
DIFFERENTIAL METHOD BLD: ABNORMAL
EOSINOPHIL # BLD: 0.2 K/UL (ref 0–0.8)
EOSINOPHIL NFR BLD: 3 % (ref 0.5–7.8)
ERYTHROCYTE [DISTWIDTH] IN BLOOD BY AUTOMATED COUNT: 16.4 % (ref 11.9–14.6)
EST. AVERAGE GLUCOSE BLD GHB EST-MCNC: 160 MG/DL
GLOBULIN SER CALC-MCNC: 3.1 G/DL (ref 2.8–4.5)
GLUCOSE SERPL-MCNC: 130 MG/DL (ref 65–100)
HBA1C MFR BLD: 7.2 % (ref 4.8–5.6)
HCT VFR BLD AUTO: 44 % (ref 41.1–50.3)
HDLC SERPL-MCNC: 40 MG/DL (ref 40–60)
HDLC SERPL: 3.3
HGB BLD-MCNC: 13.9 G/DL (ref 13.6–17.2)
IMM GRANULOCYTES # BLD AUTO: 0 K/UL (ref 0–0.5)
IMM GRANULOCYTES NFR BLD AUTO: 0 % (ref 0–5)
LDLC SERPL CALC-MCNC: 64 MG/DL
LYMPHOCYTES # BLD: 2.4 K/UL (ref 0.5–4.6)
LYMPHOCYTES NFR BLD: 38 % (ref 13–44)
MCH RBC QN AUTO: 28.9 PG (ref 26.1–32.9)
MCHC RBC AUTO-ENTMCNC: 31.6 G/DL (ref 31.4–35)
MCV RBC AUTO: 91.5 FL (ref 82–102)
MONOCYTES # BLD: 0.4 K/UL (ref 0.1–1.3)
MONOCYTES NFR BLD: 6 % (ref 4–12)
NEUTS SEG # BLD: 3.4 K/UL (ref 1.7–8.2)
NEUTS SEG NFR BLD: 52 % (ref 43–78)
NRBC # BLD: 0 K/UL (ref 0–0.2)
PLATELET # BLD AUTO: 216 K/UL (ref 150–450)
PMV BLD AUTO: 10.2 FL (ref 9.4–12.3)
POTASSIUM SERPL-SCNC: 4.8 MMOL/L (ref 3.5–5.1)
PROT SERPL-MCNC: 7.2 G/DL (ref 6.3–8.2)
RBC # BLD AUTO: 4.81 M/UL (ref 4.23–5.6)
SODIUM SERPL-SCNC: 136 MMOL/L (ref 136–146)
TRIGL SERPL-MCNC: 130 MG/DL (ref 35–150)
VLDLC SERPL CALC-MCNC: 26 MG/DL (ref 6–23)
WBC # BLD AUTO: 6.5 K/UL (ref 4.3–11.1)

## 2024-03-27 LAB — PSA SERPL DL<=0.01 NG/ML-MCNC: 3.6 NG/ML (ref 0–4)

## 2024-03-30 SDOH — ECONOMIC STABILITY: INCOME INSECURITY: HOW HARD IS IT FOR YOU TO PAY FOR THE VERY BASICS LIKE FOOD, HOUSING, MEDICAL CARE, AND HEATING?: NOT VERY HARD

## 2024-03-30 SDOH — ECONOMIC STABILITY: FOOD INSECURITY: WITHIN THE PAST 12 MONTHS, YOU WORRIED THAT YOUR FOOD WOULD RUN OUT BEFORE YOU GOT MONEY TO BUY MORE.: NEVER TRUE

## 2024-03-30 SDOH — ECONOMIC STABILITY: FOOD INSECURITY: WITHIN THE PAST 12 MONTHS, THE FOOD YOU BOUGHT JUST DIDN'T LAST AND YOU DIDN'T HAVE MONEY TO GET MORE.: NEVER TRUE

## 2024-03-30 SDOH — ECONOMIC STABILITY: TRANSPORTATION INSECURITY
IN THE PAST 12 MONTHS, HAS LACK OF TRANSPORTATION KEPT YOU FROM MEETINGS, WORK, OR FROM GETTING THINGS NEEDED FOR DAILY LIVING?: NO

## 2024-04-01 ASSESSMENT — PATIENT HEALTH QUESTIONNAIRE - PHQ9
7. TROUBLE CONCENTRATING ON THINGS, SUCH AS READING THE NEWSPAPER OR WATCHING TELEVISION: NOT AT ALL
SUM OF ALL RESPONSES TO PHQ QUESTIONS 1-9: 2
5. POOR APPETITE OR OVEREATING: NOT AT ALL
6. FEELING BAD ABOUT YOURSELF - OR THAT YOU ARE A FAILURE OR HAVE LET YOURSELF OR YOUR FAMILY DOWN: NOT AT ALL
SUM OF ALL RESPONSES TO PHQ9 QUESTIONS 1 & 2: 0
10. IF YOU CHECKED OFF ANY PROBLEMS, HOW DIFFICULT HAVE THESE PROBLEMS MADE IT FOR YOU TO DO YOUR WORK, TAKE CARE OF THINGS AT HOME, OR GET ALONG WITH OTHER PEOPLE: NOT DIFFICULT AT ALL
SUM OF ALL RESPONSES TO PHQ QUESTIONS 1-9: 2
8. MOVING OR SPEAKING SO SLOWLY THAT OTHER PEOPLE COULD HAVE NOTICED. OR THE OPPOSITE - BEING SO FIDGETY OR RESTLESS THAT YOU HAVE BEEN MOVING AROUND A LOT MORE THAN USUAL: NOT AT ALL
5. POOR APPETITE OR OVEREATING: NOT AT ALL
3. TROUBLE FALLING OR STAYING ASLEEP: SEVERAL DAYS
9. THOUGHTS THAT YOU WOULD BE BETTER OFF DEAD, OR OF HURTING YOURSELF: NOT AT ALL
SUM OF ALL RESPONSES TO PHQ QUESTIONS 1-9: 2
10. IF YOU CHECKED OFF ANY PROBLEMS, HOW DIFFICULT HAVE THESE PROBLEMS MADE IT FOR YOU TO DO YOUR WORK, TAKE CARE OF THINGS AT HOME, OR GET ALONG WITH OTHER PEOPLE: NOT DIFFICULT AT ALL
4. FEELING TIRED OR HAVING LITTLE ENERGY: SEVERAL DAYS
8. MOVING OR SPEAKING SO SLOWLY THAT OTHER PEOPLE COULD HAVE NOTICED. OR THE OPPOSITE, BEING SO FIGETY OR RESTLESS THAT YOU HAVE BEEN MOVING AROUND A LOT MORE THAN USUAL: NOT AT ALL
9. THOUGHTS THAT YOU WOULD BE BETTER OFF DEAD, OR OF HURTING YOURSELF: NOT AT ALL
SUM OF ALL RESPONSES TO PHQ QUESTIONS 1-9: 2
6. FEELING BAD ABOUT YOURSELF - OR THAT YOU ARE A FAILURE OR HAVE LET YOURSELF OR YOUR FAMILY DOWN: NOT AT ALL
7. TROUBLE CONCENTRATING ON THINGS, SUCH AS READING THE NEWSPAPER OR WATCHING TELEVISION: NOT AT ALL
1. LITTLE INTEREST OR PLEASURE IN DOING THINGS: NOT AT ALL
3. TROUBLE FALLING OR STAYING ASLEEP: SEVERAL DAYS
SUM OF ALL RESPONSES TO PHQ QUESTIONS 1-9: 2
1. LITTLE INTEREST OR PLEASURE IN DOING THINGS: NOT AT ALL
2. FEELING DOWN, DEPRESSED OR HOPELESS: NOT AT ALL
4. FEELING TIRED OR HAVING LITTLE ENERGY: SEVERAL DAYS
2. FEELING DOWN, DEPRESSED OR HOPELESS: NOT AT ALL

## 2024-04-02 ENCOUNTER — OFFICE VISIT (OUTPATIENT)
Dept: PRIMARY CARE CLINIC | Facility: CLINIC | Age: 74
End: 2024-04-02
Payer: MEDICARE

## 2024-04-02 VITALS
HEART RATE: 88 BPM | BODY MASS INDEX: 24.95 KG/M2 | SYSTOLIC BLOOD PRESSURE: 131 MMHG | HEIGHT: 72 IN | WEIGHT: 184.2 LBS | TEMPERATURE: 98.1 F | OXYGEN SATURATION: 96 % | DIASTOLIC BLOOD PRESSURE: 75 MMHG

## 2024-04-02 DIAGNOSIS — E11.22 TYPE 2 DIABETES MELLITUS WITH STAGE 3A CHRONIC KIDNEY DISEASE, WITHOUT LONG-TERM CURRENT USE OF INSULIN (HCC): Primary | ICD-10-CM

## 2024-04-02 DIAGNOSIS — M54.50 CHRONIC MIDLINE LOW BACK PAIN WITHOUT SCIATICA: ICD-10-CM

## 2024-04-02 DIAGNOSIS — E78.2 MIXED HYPERLIPIDEMIA: ICD-10-CM

## 2024-04-02 DIAGNOSIS — I10 PRIMARY HYPERTENSION: ICD-10-CM

## 2024-04-02 DIAGNOSIS — Z79.899 ENCOUNTER FOR LONG-TERM (CURRENT) USE OF MEDICATIONS: ICD-10-CM

## 2024-04-02 DIAGNOSIS — G89.29 CHRONIC MIDLINE LOW BACK PAIN WITHOUT SCIATICA: ICD-10-CM

## 2024-04-02 DIAGNOSIS — F32.5 MAJOR DEPRESSIVE DISORDER WITH SINGLE EPISODE, IN FULL REMISSION (HCC): ICD-10-CM

## 2024-04-02 DIAGNOSIS — N18.31 TYPE 2 DIABETES MELLITUS WITH STAGE 3A CHRONIC KIDNEY DISEASE, WITHOUT LONG-TERM CURRENT USE OF INSULIN (HCC): Primary | ICD-10-CM

## 2024-04-02 PROCEDURE — 3075F SYST BP GE 130 - 139MM HG: CPT | Performed by: FAMILY MEDICINE

## 2024-04-02 PROCEDURE — 4004F PT TOBACCO SCREEN RCVD TLK: CPT | Performed by: FAMILY MEDICINE

## 2024-04-02 PROCEDURE — 2022F DILAT RTA XM EVC RTNOPTHY: CPT | Performed by: FAMILY MEDICINE

## 2024-04-02 PROCEDURE — 3017F COLORECTAL CA SCREEN DOC REV: CPT | Performed by: FAMILY MEDICINE

## 2024-04-02 PROCEDURE — 3051F HG A1C>EQUAL 7.0%<8.0%: CPT | Performed by: FAMILY MEDICINE

## 2024-04-02 PROCEDURE — 3078F DIAST BP <80 MM HG: CPT | Performed by: FAMILY MEDICINE

## 2024-04-02 PROCEDURE — G8420 CALC BMI NORM PARAMETERS: HCPCS | Performed by: FAMILY MEDICINE

## 2024-04-02 PROCEDURE — 1123F ACP DISCUSS/DSCN MKR DOCD: CPT | Performed by: FAMILY MEDICINE

## 2024-04-02 PROCEDURE — 99213 OFFICE O/P EST LOW 20 MIN: CPT | Performed by: FAMILY MEDICINE

## 2024-04-02 PROCEDURE — G8427 DOCREV CUR MEDS BY ELIG CLIN: HCPCS | Performed by: FAMILY MEDICINE

## 2024-04-02 SDOH — ECONOMIC STABILITY: FOOD INSECURITY: WITHIN THE PAST 12 MONTHS, YOU WORRIED THAT YOUR FOOD WOULD RUN OUT BEFORE YOU GOT MONEY TO BUY MORE.: NEVER TRUE

## 2024-04-02 SDOH — ECONOMIC STABILITY: INCOME INSECURITY: HOW HARD IS IT FOR YOU TO PAY FOR THE VERY BASICS LIKE FOOD, HOUSING, MEDICAL CARE, AND HEATING?: NOT VERY HARD

## 2024-04-02 SDOH — ECONOMIC STABILITY: FOOD INSECURITY: WITHIN THE PAST 12 MONTHS, THE FOOD YOU BOUGHT JUST DIDN'T LAST AND YOU DIDN'T HAVE MONEY TO GET MORE.: NEVER TRUE

## 2024-04-02 NOTE — PROGRESS NOTES
insulin (HCC)  empagliflozin (JARDIANCE) 10 MG tablet      2. Major depressive disorder with single episode, in full remission (HCC)        3. Primary hypertension        4. Mixed hyperlipidemia        5. Chronic midline low back pain without sciatica        6. Encounter for long-term (current) use of medications            Follow up and Dispositions:  Return in about 4 months (around 8/2/2024) for LABS BEFORE NEXT APPOINTMENT.     Patient is stable on medications and will continue current medications.  Refilled the above medications.  Reviewed medications and side effects in detail.  Was given samples of Jardiance 10 mg.  Will check labs before next visit.  Reviewed most recent labs.  Reviewed diet, exercise and weight control.  Cardiovascular risks and recommendations reviewed.  Patient encouraged to follow a diabetic/low sodium diet.  Use of aspirin to prevent MIs and TIAs discussed.   Patient will check blood sugars once daily.    Rosemarie Bravo MD    Dictated using voice recognition software. Proofread, but unrecognized voice recognition errors may exist.

## 2024-05-13 ENCOUNTER — OFFICE VISIT (OUTPATIENT)
Dept: UROLOGY | Age: 74
End: 2024-05-13
Payer: MEDICARE

## 2024-05-13 DIAGNOSIS — N40.1 BPH WITH OBSTRUCTION/LOWER URINARY TRACT SYMPTOMS: ICD-10-CM

## 2024-05-13 DIAGNOSIS — N45.1 EPIDIDYMITIS: Primary | ICD-10-CM

## 2024-05-13 DIAGNOSIS — N13.8 BPH WITH OBSTRUCTION/LOWER URINARY TRACT SYMPTOMS: ICD-10-CM

## 2024-05-13 DIAGNOSIS — N30.00 ACUTE CYSTITIS WITHOUT HEMATURIA: ICD-10-CM

## 2024-05-13 DIAGNOSIS — N43.3 BILATERAL HYDROCELE: ICD-10-CM

## 2024-05-13 LAB
BILIRUBIN, URINE, POC: NEGATIVE
BLOOD URINE, POC: NEGATIVE
GLUCOSE URINE, POC: 1000
KETONES, URINE, POC: NEGATIVE
LEUKOCYTE ESTERASE, URINE, POC: NEGATIVE
NITRITE, URINE, POC: NEGATIVE
PH, URINE, POC: 7.5 (ref 4.6–8)
PROTEIN,URINE, POC: NEGATIVE
SPECIFIC GRAVITY, URINE, POC: 1.01 (ref 1–1.03)
URINALYSIS CLARITY, POC: NORMAL
URINALYSIS COLOR, POC: NORMAL
UROBILINOGEN, POC: NORMAL

## 2024-05-13 PROCEDURE — 81003 URINALYSIS AUTO W/O SCOPE: CPT | Performed by: NURSE PRACTITIONER

## 2024-05-13 PROCEDURE — 4004F PT TOBACCO SCREEN RCVD TLK: CPT | Performed by: NURSE PRACTITIONER

## 2024-05-13 PROCEDURE — G8427 DOCREV CUR MEDS BY ELIG CLIN: HCPCS | Performed by: NURSE PRACTITIONER

## 2024-05-13 PROCEDURE — 99214 OFFICE O/P EST MOD 30 MIN: CPT | Performed by: NURSE PRACTITIONER

## 2024-05-13 PROCEDURE — G8420 CALC BMI NORM PARAMETERS: HCPCS | Performed by: NURSE PRACTITIONER

## 2024-05-13 PROCEDURE — 1123F ACP DISCUSS/DSCN MKR DOCD: CPT | Performed by: NURSE PRACTITIONER

## 2024-05-13 PROCEDURE — 3017F COLORECTAL CA SCREEN DOC REV: CPT | Performed by: NURSE PRACTITIONER

## 2024-05-13 ASSESSMENT — ENCOUNTER SYMPTOMS
BACK PAIN: 0
NAUSEA: 0

## 2024-05-13 NOTE — PROGRESS NOTES
[facility]  [address]  [place] [address]  [phone]          Tommy Granger  : 1950    Chief Complaint   Patient presents with    Groin Swelling          HPI     Tommy Granger is a 74 y.o. male with a PMH of refractory BPH/LUTS s/p urolift 20 and post-urolift TURP 2022 due to persistent LUTS who returns for follow up today.  TURP pathology negative.      Prior to TURP, he was on flomax for U/F/UUI/nocturia X 6.  He tried mirabegron / vesicare which caused side effects and urinary retention.       Of note, he also has a history of elevated PSA 8.1 in the past s/p negative TRUS biopsy in 2011.  Recent PSAs have been WNL.  Will continue screening with PCP    Back today for ER follow up. Seen 24 at [facility]. Urine culture (24) grew sign E Coli. JOSE GUADALUPE (24) no torsion. R epididymitis. B hydroceles. Images not available for review. Today he reports voiding well. No pain. Cont to have swelling.     **started Jardiance in the past few months    Of note, he is diabetic. hA1c 7.2 on 3/24.     PSA followed by PCP. 3.600 on 3/24.    Past Medical History:   Diagnosis Date    Acute ulcer of stomach     / ?    Anxiety and depression     Arthritis     BCC (basal cell carcinoma of skin)     BPH (benign prostatic hyperplasia)     CAD (coronary artery disease)     mi -- cardiac stents x 2- last one placed ---- followed by dr. sy    Chronic maxillary sinusitis     Diabetes (HCC)     type 2-- sqbs am avg 135-145    HLD (hyperlipidemia)     Controlled with meds     HTN (hypertension)     Controlled with meds     Melanoma (HCC)     located on Head- surgical removal    MI (myocardial infarction) (HCC)         Sleep apnea     pt denies---    Smoker     1 ppd since age 21     Past Surgical History:   Procedure Laterality Date    CATARACT EXTRACTION, BILATERAL      CERVICAL FUSION      ACDF    COLONOSCOPY      COLONOSCOPY Bilateral

## 2024-06-11 ENCOUNTER — OFFICE VISIT (OUTPATIENT)
Dept: UROLOGY | Age: 74
End: 2024-06-11
Payer: MEDICARE

## 2024-06-11 DIAGNOSIS — N13.8 BPH WITH OBSTRUCTION/LOWER URINARY TRACT SYMPTOMS: ICD-10-CM

## 2024-06-11 DIAGNOSIS — N40.1 BPH WITH OBSTRUCTION/LOWER URINARY TRACT SYMPTOMS: ICD-10-CM

## 2024-06-11 DIAGNOSIS — N30.00 ACUTE CYSTITIS WITHOUT HEMATURIA: ICD-10-CM

## 2024-06-11 DIAGNOSIS — N45.1 EPIDIDYMITIS: Primary | ICD-10-CM

## 2024-06-11 DIAGNOSIS — N43.3 BILATERAL HYDROCELE: ICD-10-CM

## 2024-06-11 LAB
BILIRUBIN, URINE, POC: NEGATIVE
BLOOD URINE, POC: NEGATIVE
GLUCOSE URINE, POC: 500
KETONES, URINE, POC: NEGATIVE
LEUKOCYTE ESTERASE, URINE, POC: NEGATIVE
NITRITE, URINE, POC: NEGATIVE
PH, URINE, POC: 6 (ref 4.6–8)
PROTEIN,URINE, POC: NEGATIVE
SPECIFIC GRAVITY, URINE, POC: 1.02 (ref 1–1.03)
URINALYSIS CLARITY, POC: NORMAL
URINALYSIS COLOR, POC: NORMAL
UROBILINOGEN, POC: NORMAL

## 2024-06-11 PROCEDURE — 99214 OFFICE O/P EST MOD 30 MIN: CPT | Performed by: NURSE PRACTITIONER

## 2024-06-11 PROCEDURE — 3017F COLORECTAL CA SCREEN DOC REV: CPT | Performed by: NURSE PRACTITIONER

## 2024-06-11 PROCEDURE — 4004F PT TOBACCO SCREEN RCVD TLK: CPT | Performed by: NURSE PRACTITIONER

## 2024-06-11 PROCEDURE — G8427 DOCREV CUR MEDS BY ELIG CLIN: HCPCS | Performed by: NURSE PRACTITIONER

## 2024-06-11 PROCEDURE — 1123F ACP DISCUSS/DSCN MKR DOCD: CPT | Performed by: NURSE PRACTITIONER

## 2024-06-11 PROCEDURE — 81003 URINALYSIS AUTO W/O SCOPE: CPT | Performed by: NURSE PRACTITIONER

## 2024-06-11 PROCEDURE — G8420 CALC BMI NORM PARAMETERS: HCPCS | Performed by: NURSE PRACTITIONER

## 2024-06-11 ASSESSMENT — ENCOUNTER SYMPTOMS
BACK PAIN: 0
NAUSEA: 0

## 2024-06-11 NOTE — PROGRESS NOTES
Connections: Not on file   Intimate Partner Violence: Not on file   Housing Stability: Unknown (4/2/2024)    Housing Stability Vital Sign     Unable to Pay for Housing in the Last Year: Not on file     Number of Places Lived in the Last Year: Not on file     Unstable Housing in the Last Year: No     Family History   Problem Relation Age of Onset    Hypertension Mother     Stroke Mother         several TIAs    Diabetes Father 85        type 2    Heart Attack Father     Hypertension Father     Heart Disease Father     Hearing Loss Father     Heart Attack Other     Heart Disease Other     Heart Disease Brother         Recently had 5 By-passes    Cancer Neg Hx        Review of Systems  Constitutional:   Negative for fever.  GI:  Negative for nausea.  Musculoskeletal:  Negative for back pain.      Urinalysis  UA - Dipstick  Results for orders placed or performed in visit on 06/11/24   AMB POC URINALYSIS DIP STICK AUTO W/O MICRO   Result Value Ref Range    Color (UA POC)      Clarity (UA POC)      Glucose, Urine,      Bilirubin, Urine, POC Negative     KETONES, Urine, POC Negative     Specific Gravity, Urine, POC 1.020 1.001 - 1.035    Blood (UA POC) Negative     pH, Urine, POC 6.0 4.6 - 8.0    Protein, Urine, POC Negative     Urobilinogen, POC 0.2 mg/dL     Nitrite, Urine, POC Negative     Leukocyte Esterase, Urine, POC Negative        UA - Micro  WBC - 0  RBC - 0  Bacteria - 0  Epith - 0    PHYSICAL EXAM    General appearance - well appearing and in no distress  Mental status - alert, oriented to person, place, and time  Neck - supple, no significant adenopathy  Chest/Lung-  Quiet, even and easy respiratory effort without use of accessory muscles  - pt declined   Skin - normal coloration and turgor, no rashes      Assessment and Plan    ICD-10-CM    1. Epididymitis  N45.1 AMB POC URINALYSIS DIP STICK AUTO W/O MICRO      2. Acute cystitis without hematuria  N30.00       3. BPH with obstruction/lower urinary

## 2024-07-20 DIAGNOSIS — E78.2 MIXED HYPERLIPIDEMIA: ICD-10-CM

## 2024-07-20 DIAGNOSIS — N18.31 STAGE 3A CHRONIC KIDNEY DISEASE (HCC): ICD-10-CM

## 2024-07-20 DIAGNOSIS — I10 ESSENTIAL HYPERTENSION: ICD-10-CM

## 2024-07-20 RX ORDER — LISINOPRIL 20 MG/1
20 TABLET ORAL DAILY
Qty: 90 TABLET | Refills: 3 | Status: SHIPPED | OUTPATIENT
Start: 2024-07-20

## 2024-07-20 RX ORDER — FENOFIBRATE 160 MG/1
160 TABLET ORAL DAILY
Qty: 90 TABLET | Refills: 3 | Status: SHIPPED | OUTPATIENT
Start: 2024-07-20

## 2024-08-09 ENCOUNTER — LAB (OUTPATIENT)
Dept: PRIMARY CARE CLINIC | Facility: CLINIC | Age: 74
End: 2024-08-09

## 2024-08-09 DIAGNOSIS — E55.9 VITAMIN D DEFICIENCY: ICD-10-CM

## 2024-08-09 DIAGNOSIS — E11.22 TYPE 2 DIABETES MELLITUS WITH STAGE 3A CHRONIC KIDNEY DISEASE, WITHOUT LONG-TERM CURRENT USE OF INSULIN (HCC): Primary | ICD-10-CM

## 2024-08-09 DIAGNOSIS — N18.31 TYPE 2 DIABETES MELLITUS WITH STAGE 3A CHRONIC KIDNEY DISEASE, WITHOUT LONG-TERM CURRENT USE OF INSULIN (HCC): Primary | ICD-10-CM

## 2024-08-09 DIAGNOSIS — E78.2 MIXED HYPERLIPIDEMIA: ICD-10-CM

## 2024-08-09 DIAGNOSIS — Z79.899 ENCOUNTER FOR LONG-TERM (CURRENT) USE OF MEDICATIONS: ICD-10-CM

## 2024-08-09 LAB
25(OH)D3 SERPL-MCNC: 43.8 NG/ML (ref 30–100)
ALBUMIN SERPL-MCNC: 4.2 G/DL (ref 3.2–4.6)
ALBUMIN/GLOB SERPL: 1.9 (ref 1–1.9)
ALP SERPL-CCNC: 46 U/L (ref 40–129)
ALT SERPL-CCNC: 21 U/L (ref 12–65)
ANION GAP SERPL CALC-SCNC: 12 MMOL/L (ref 9–18)
AST SERPL-CCNC: 25 U/L (ref 15–37)
BASOPHILS # BLD: 0.1 K/UL (ref 0–0.2)
BASOPHILS NFR BLD: 1 % (ref 0–2)
BILIRUB SERPL-MCNC: 0.3 MG/DL (ref 0–1.2)
BUN SERPL-MCNC: 21 MG/DL (ref 8–23)
CALCIUM SERPL-MCNC: 10 MG/DL (ref 8.8–10.2)
CHLORIDE SERPL-SCNC: 105 MMOL/L (ref 98–107)
CHOLEST SERPL-MCNC: 146 MG/DL (ref 0–200)
CO2 SERPL-SCNC: 23 MMOL/L (ref 20–28)
CREAT SERPL-MCNC: 1.24 MG/DL (ref 0.8–1.3)
DIFFERENTIAL METHOD BLD: ABNORMAL
EOSINOPHIL # BLD: 0.2 K/UL (ref 0–0.8)
EOSINOPHIL NFR BLD: 2 % (ref 0.5–7.8)
ERYTHROCYTE [DISTWIDTH] IN BLOOD BY AUTOMATED COUNT: 15.4 % (ref 11.9–14.6)
EST. AVERAGE GLUCOSE BLD GHB EST-MCNC: 159 MG/DL
GLOBULIN SER CALC-MCNC: 2.3 G/DL (ref 2.3–3.5)
GLUCOSE SERPL-MCNC: 142 MG/DL (ref 70–99)
HBA1C MFR BLD: 7.2 % (ref 0–5.6)
HCT VFR BLD AUTO: 44.8 % (ref 41.1–50.3)
HDLC SERPL-MCNC: 38 MG/DL (ref 40–60)
HDLC SERPL: 3.8 (ref 0–5)
HGB BLD-MCNC: 14.6 G/DL (ref 13.6–17.2)
IMM GRANULOCYTES # BLD AUTO: 0 K/UL (ref 0–0.5)
IMM GRANULOCYTES NFR BLD AUTO: 0 % (ref 0–5)
LDLC SERPL CALC-MCNC: 75 MG/DL (ref 0–100)
LYMPHOCYTES # BLD: 2.9 K/UL (ref 0.5–4.6)
LYMPHOCYTES NFR BLD: 30 % (ref 13–44)
MCH RBC QN AUTO: 30.4 PG (ref 26.1–32.9)
MCHC RBC AUTO-ENTMCNC: 32.6 G/DL (ref 31.4–35)
MCV RBC AUTO: 93.3 FL (ref 82–102)
MONOCYTES # BLD: 0.5 K/UL (ref 0.1–1.3)
MONOCYTES NFR BLD: 5 % (ref 4–12)
NEUTS SEG # BLD: 6 K/UL (ref 1.7–8.2)
NEUTS SEG NFR BLD: 62 % (ref 43–78)
NRBC # BLD: 0 K/UL (ref 0–0.2)
PLATELET # BLD AUTO: 223 K/UL (ref 150–450)
PMV BLD AUTO: 10.2 FL (ref 9.4–12.3)
POTASSIUM SERPL-SCNC: 4.8 MMOL/L (ref 3.5–5.1)
PROT SERPL-MCNC: 6.5 G/DL (ref 6.3–8.2)
RBC # BLD AUTO: 4.8 M/UL (ref 4.23–5.6)
SODIUM SERPL-SCNC: 139 MMOL/L (ref 136–145)
TRIGL SERPL-MCNC: 165 MG/DL (ref 0–150)
VLDLC SERPL CALC-MCNC: 33 MG/DL (ref 6–23)
WBC # BLD AUTO: 9.7 K/UL (ref 4.3–11.1)

## 2024-08-15 ENCOUNTER — OFFICE VISIT (OUTPATIENT)
Dept: PRIMARY CARE CLINIC | Facility: CLINIC | Age: 74
End: 2024-08-15

## 2024-08-15 VITALS
HEIGHT: 72 IN | DIASTOLIC BLOOD PRESSURE: 74 MMHG | OXYGEN SATURATION: 95 % | SYSTOLIC BLOOD PRESSURE: 133 MMHG | BODY MASS INDEX: 24.6 KG/M2 | WEIGHT: 181.6 LBS | TEMPERATURE: 97.7 F | HEART RATE: 93 BPM

## 2024-08-15 DIAGNOSIS — R58 ECCHYMOSIS OF FOREARM: ICD-10-CM

## 2024-08-15 DIAGNOSIS — E78.2 MIXED HYPERLIPIDEMIA: ICD-10-CM

## 2024-08-15 DIAGNOSIS — E11.9 TYPE 2 DIABETES MELLITUS WITHOUT COMPLICATION, WITHOUT LONG-TERM CURRENT USE OF INSULIN (HCC): ICD-10-CM

## 2024-08-15 DIAGNOSIS — Z00.00 MEDICARE ANNUAL WELLNESS VISIT, SUBSEQUENT: Primary | ICD-10-CM

## 2024-08-15 DIAGNOSIS — Z79.899 ENCOUNTER FOR LONG-TERM (CURRENT) USE OF MEDICATIONS: ICD-10-CM

## 2024-08-15 DIAGNOSIS — I10 ESSENTIAL HYPERTENSION: ICD-10-CM

## 2024-08-15 DIAGNOSIS — F51.01 PRIMARY INSOMNIA: ICD-10-CM

## 2024-08-15 ASSESSMENT — PATIENT HEALTH QUESTIONNAIRE - PHQ9
2. FEELING DOWN, DEPRESSED OR HOPELESS: NOT AT ALL
9. THOUGHTS THAT YOU WOULD BE BETTER OFF DEAD, OR OF HURTING YOURSELF: NOT AT ALL
4. FEELING TIRED OR HAVING LITTLE ENERGY: SEVERAL DAYS
7. TROUBLE CONCENTRATING ON THINGS, SUCH AS READING THE NEWSPAPER OR WATCHING TELEVISION: NOT AT ALL
10. IF YOU CHECKED OFF ANY PROBLEMS, HOW DIFFICULT HAVE THESE PROBLEMS MADE IT FOR YOU TO DO YOUR WORK, TAKE CARE OF THINGS AT HOME, OR GET ALONG WITH OTHER PEOPLE: NOT DIFFICULT AT ALL
8. MOVING OR SPEAKING SO SLOWLY THAT OTHER PEOPLE COULD HAVE NOTICED. OR THE OPPOSITE, BEING SO FIGETY OR RESTLESS THAT YOU HAVE BEEN MOVING AROUND A LOT MORE THAN USUAL: NOT AT ALL
3. TROUBLE FALLING OR STAYING ASLEEP: NOT AT ALL
SUM OF ALL RESPONSES TO PHQ QUESTIONS 1-9: 1
SUM OF ALL RESPONSES TO PHQ9 QUESTIONS 1 & 2: 0
5. POOR APPETITE OR OVEREATING: NOT AT ALL
SUM OF ALL RESPONSES TO PHQ QUESTIONS 1-9: 1
1. LITTLE INTEREST OR PLEASURE IN DOING THINGS: NOT AT ALL
6. FEELING BAD ABOUT YOURSELF - OR THAT YOU ARE A FAILURE OR HAVE LET YOURSELF OR YOUR FAMILY DOWN: NOT AT ALL
SUM OF ALL RESPONSES TO PHQ QUESTIONS 1-9: 1
SUM OF ALL RESPONSES TO PHQ QUESTIONS 1-9: 1

## 2024-08-15 NOTE — PROGRESS NOTES
MD Huyen   atorvastatin (LIPITOR) 80 MG tablet Take 1 tablet by mouth daily Yes Rosemarie Bravo MD   traZODone (DESYREL) 50 MG tablet Take 1 tablet by mouth nightly Take 50 mg by mouth Yes Rosemarie Brvao MD   metFORMIN (GLUCOPHAGE) 1000 MG tablet Take 1 tablet by mouth 2 times daily (with meals) Yes Rosemarie Bravo MD   Multiple Vitamins-Minerals (CENTRUM SILVER 50+MEN PO) Take by mouth daily Yes ProviderHaritha MD   citalopram (CELEXA) 20 MG tablet Take 1 tablet by mouth daily Yes Rosemarie Bravo MD   Lancets MISC Test blood sugar daily. Dx:E11.9 Yes Automatic Reconciliation, Ar   aspirin 81 MG EC tablet Take 1 tablet by mouth Yes Automatic Reconciliation, Ar   empagliflozin (JARDIANCE) 10 MG tablet Take 1 tablet by mouth daily for 28 days  Rosemarie Bravo MD   amoxicillin (AMOXIL) 500 MG capsule Take 1 capsule by mouth 3 times daily  Patient not taking: Reported on 5/13/2024  ProviderHaritha MD   montelukast (SINGULAIR) 10 MG tablet Take 1 tablet by mouth daily For post nasal drainage  Patient not taking: Reported on 8/15/2024  Rosemarie Bravo MD   ondansetron (ZOFRAN-ODT) 4 MG disintegrating tablet Take 1 tablet by mouth 3 times daily as needed for Nausea or Vomiting  Patient not taking: Reported on 8/15/2024  Aristeo Alba MD       [facility] (Including outside providers/suppliers regularly involved in providing care):   Patient Care Team:  Rosemarie Bravo MD as PCP - General (Family Medicine)  Rosemarie Bravo MD as PCP - Empaneled Provider  Aristeo Alba MD (Otolaryngology)      Reviewed and updated this visit:  Tobacco  Allergies  Meds  Problems  Med Hx  Surg Hx  Soc Hx  Fam Hx              
as indicated.      Rosemarie Bravo MD    Dictated using voice recognition software. Proofread, but unrecognized voice recognition errors may exist.

## 2024-08-19 ENCOUNTER — OFFICE VISIT (OUTPATIENT)
Age: 74
End: 2024-08-19
Payer: MEDICARE

## 2024-08-19 VITALS
BODY MASS INDEX: 24.52 KG/M2 | HEIGHT: 72 IN | SYSTOLIC BLOOD PRESSURE: 132 MMHG | WEIGHT: 181 LBS | HEART RATE: 80 BPM | DIASTOLIC BLOOD PRESSURE: 60 MMHG

## 2024-08-19 DIAGNOSIS — I25.10 CORONARY ARTERY DISEASE INVOLVING NATIVE CORONARY ARTERY OF NATIVE HEART WITHOUT ANGINA PECTORIS: Primary | ICD-10-CM

## 2024-08-19 DIAGNOSIS — E78.2 MIXED HYPERLIPIDEMIA: ICD-10-CM

## 2024-08-19 DIAGNOSIS — I10 ESSENTIAL HYPERTENSION: ICD-10-CM

## 2024-08-19 DIAGNOSIS — N18.31 STAGE 3A CHRONIC KIDNEY DISEASE (HCC): ICD-10-CM

## 2024-08-19 PROCEDURE — 99214 OFFICE O/P EST MOD 30 MIN: CPT | Performed by: INTERNAL MEDICINE

## 2024-08-19 PROCEDURE — 3075F SYST BP GE 130 - 139MM HG: CPT | Performed by: INTERNAL MEDICINE

## 2024-08-19 PROCEDURE — G8420 CALC BMI NORM PARAMETERS: HCPCS | Performed by: INTERNAL MEDICINE

## 2024-08-19 PROCEDURE — 3017F COLORECTAL CA SCREEN DOC REV: CPT | Performed by: INTERNAL MEDICINE

## 2024-08-19 PROCEDURE — 4004F PT TOBACCO SCREEN RCVD TLK: CPT | Performed by: INTERNAL MEDICINE

## 2024-08-19 PROCEDURE — 3078F DIAST BP <80 MM HG: CPT | Performed by: INTERNAL MEDICINE

## 2024-08-19 PROCEDURE — 1123F ACP DISCUSS/DSCN MKR DOCD: CPT | Performed by: INTERNAL MEDICINE

## 2024-08-19 PROCEDURE — G8428 CUR MEDS NOT DOCUMENT: HCPCS | Performed by: INTERNAL MEDICINE

## 2024-08-19 NOTE — PROGRESS NOTES
[address] [facility], [address]  [place] [address]  PHONE: [phone]     24    NAME:  Tommy Granger  : 1950  MRN: [id number]       SUBJECTIVE:   Tommy Granger is a 74 y.o. male seen for a follow up visit regarding the following:     Chief Complaint   Patient presents with    Coronary Artery Disease       HPI: He has CAD, prior PCI yrs and yrs ago in [place].    Echo 2023: normal EF  NST 2023:  low risk of myocardial ischemia.        No CP, angina, playing golf.   Active, no angina.  No palp, edema. no new WHITE, edema.    Feeling well now, no angina.   Patient denies recent history of orthopnea, PND, excessive dizziness and/or syncope.        Ofelia Granger, his mother, is my patients.       Past Medical History, Past Surgical History, Family history, Social History, and Medications were all reviewed with the patient today and updated as necessary.     Current Outpatient Medications   Medication Sig Dispense Refill    metFORMIN (GLUCOPHAGE) 1000 MG tablet Take 1 tablet by mouth Daily with supper 90 tablet 3    lisinopril (PRINIVIL;ZESTRIL) 20 MG tablet Take 1 tablet by mouth once daily 90 tablet 3    fenofibrate (TRIGLIDE) 160 MG tablet Take 1 tablet by mouth once daily 90 tablet 3    empagliflozin (JARDIANCE) 10 MG tablet Take 1 tablet by mouth daily For diabetes 90 tablet 3    amLODIPine (NORVASC) 10 MG tablet Take 1 tablet by mouth once daily 90 tablet 3    atorvastatin (LIPITOR) 80 MG tablet Take 1 tablet by mouth daily 90 tablet 3    traZODone (DESYREL) 50 MG tablet Take 1 tablet by mouth nightly Take 50 mg by mouth 90 tablet 3    Multiple Vitamins-Minerals (CENTRUM SILVER 50+MEN PO) Take by mouth daily      citalopram (CELEXA) 20 MG tablet Take 1 tablet by mouth daily 90 tablet 3    aspirin 81 MG EC tablet Take 1 tablet by mouth      blood glucose test strips (ONETOUCH ULTRA) strip 1 each by In Vitro route daily DX: E11.22 (Patient not taking: Reported on 2024) 100 each 5

## 2024-08-25 DIAGNOSIS — F32.5 MAJOR DEPRESSIVE DISORDER WITH SINGLE EPISODE, IN FULL REMISSION (HCC): ICD-10-CM

## 2024-08-25 RX ORDER — CITALOPRAM 20 MG/1
20 TABLET ORAL DAILY
Qty: 90 TABLET | Refills: 1 | Status: SHIPPED | OUTPATIENT
Start: 2024-08-25

## 2024-11-01 DIAGNOSIS — E11.9 TYPE 2 DIABETES MELLITUS WITHOUT COMPLICATION, WITHOUT LONG-TERM CURRENT USE OF INSULIN (HCC): ICD-10-CM

## 2024-11-08 DIAGNOSIS — R09.82 POST-NASAL DRAINAGE: ICD-10-CM

## 2024-11-08 DIAGNOSIS — J30.1 SEASONAL ALLERGIC RHINITIS DUE TO POLLEN: ICD-10-CM

## 2024-11-08 RX ORDER — MONTELUKAST SODIUM 10 MG/1
10 TABLET ORAL DAILY
Qty: 90 TABLET | Refills: 0 | OUTPATIENT
Start: 2024-11-08

## 2024-11-08 RX ORDER — SILDENAFIL CITRATE 20 MG/1
20 TABLET ORAL PRN
Qty: 90 TABLET | Refills: 0 | OUTPATIENT
Start: 2024-11-08

## 2024-12-11 DIAGNOSIS — F51.01 PRIMARY INSOMNIA: ICD-10-CM

## 2024-12-11 RX ORDER — TRAZODONE HYDROCHLORIDE 50 MG/1
50 TABLET, FILM COATED ORAL NIGHTLY
Qty: 90 TABLET | Refills: 0 | OUTPATIENT
Start: 2024-12-11

## 2024-12-12 DIAGNOSIS — F51.01 PRIMARY INSOMNIA: ICD-10-CM

## 2024-12-12 RX ORDER — TRAZODONE HYDROCHLORIDE 50 MG/1
50 TABLET, FILM COATED ORAL NIGHTLY
Qty: 90 TABLET | Refills: 0 | OUTPATIENT
Start: 2024-12-12

## 2024-12-17 DIAGNOSIS — F51.01 PRIMARY INSOMNIA: ICD-10-CM

## 2024-12-17 RX ORDER — TRAZODONE HYDROCHLORIDE 50 MG/1
50 TABLET, FILM COATED ORAL NIGHTLY
Qty: 90 TABLET | Refills: 0 | OUTPATIENT
Start: 2024-12-17

## 2024-12-26 DIAGNOSIS — F51.01 PRIMARY INSOMNIA: ICD-10-CM

## 2024-12-26 RX ORDER — TRAZODONE HYDROCHLORIDE 50 MG/1
50 TABLET, FILM COATED ORAL NIGHTLY
Qty: 90 TABLET | Refills: 1 | Status: SHIPPED | OUTPATIENT
Start: 2024-12-26

## 2025-01-03 ENCOUNTER — PATIENT MESSAGE (OUTPATIENT)
Dept: PRIMARY CARE CLINIC | Facility: CLINIC | Age: 75
End: 2025-01-03

## 2025-01-03 RX ORDER — NIRMATRELVIR AND RITONAVIR 150-100 MG
KIT ORAL
Qty: 20 EACH | Refills: 0 | OUTPATIENT
Start: 2025-01-03

## 2025-01-14 DIAGNOSIS — E11.9 TYPE 2 DIABETES MELLITUS WITHOUT COMPLICATION, WITHOUT LONG-TERM CURRENT USE OF INSULIN (HCC): ICD-10-CM

## 2025-02-02 DIAGNOSIS — I10 PRIMARY HYPERTENSION: ICD-10-CM

## 2025-02-02 RX ORDER — AMLODIPINE BESYLATE 10 MG/1
TABLET ORAL
Qty: 90 TABLET | Refills: 0 | Status: SHIPPED | OUTPATIENT
Start: 2025-02-02

## 2025-02-14 ENCOUNTER — LAB (OUTPATIENT)
Dept: PRIMARY CARE CLINIC | Facility: CLINIC | Age: 75
End: 2025-02-14

## 2025-02-14 DIAGNOSIS — E11.22 TYPE 2 DIABETES MELLITUS WITH STAGE 3A CHRONIC KIDNEY DISEASE, WITHOUT LONG-TERM CURRENT USE OF INSULIN (HCC): ICD-10-CM

## 2025-02-14 DIAGNOSIS — Z79.899 ENCOUNTER FOR LONG-TERM (CURRENT) USE OF MEDICATIONS: ICD-10-CM

## 2025-02-14 DIAGNOSIS — E55.9 VITAMIN D DEFICIENCY: ICD-10-CM

## 2025-02-14 DIAGNOSIS — E78.2 MIXED HYPERLIPIDEMIA: Primary | ICD-10-CM

## 2025-02-14 DIAGNOSIS — N18.31 TYPE 2 DIABETES MELLITUS WITH STAGE 3A CHRONIC KIDNEY DISEASE, WITHOUT LONG-TERM CURRENT USE OF INSULIN (HCC): ICD-10-CM

## 2025-02-14 LAB
25(OH)D3 SERPL-MCNC: 31.4 NG/ML (ref 30–100)
ALBUMIN SERPL-MCNC: 3.7 G/DL (ref 3.2–4.6)
ALBUMIN/GLOB SERPL: 1.2 (ref 1–1.9)
ALP SERPL-CCNC: 69 U/L (ref 40–129)
ALT SERPL-CCNC: 16 U/L (ref 8–55)
ANION GAP SERPL CALC-SCNC: 12 MMOL/L (ref 7–16)
AST SERPL-CCNC: 19 U/L (ref 15–37)
BASOPHILS # BLD: 0.07 K/UL (ref 0–0.2)
BASOPHILS NFR BLD: 0.6 % (ref 0–2)
BILIRUB SERPL-MCNC: 0.4 MG/DL (ref 0–1.2)
BUN SERPL-MCNC: 23 MG/DL (ref 8–23)
CALCIUM SERPL-MCNC: 10.2 MG/DL (ref 8.8–10.2)
CHLORIDE SERPL-SCNC: 102 MMOL/L (ref 98–107)
CHOLEST SERPL-MCNC: 152 MG/DL (ref 0–200)
CO2 SERPL-SCNC: 23 MMOL/L (ref 20–29)
CREAT SERPL-MCNC: 1.45 MG/DL (ref 0.8–1.3)
CREAT UR-MCNC: 95.1 MG/DL (ref 39–259)
DIFFERENTIAL METHOD BLD: ABNORMAL
EOSINOPHIL # BLD: 0.3 K/UL (ref 0–0.8)
EOSINOPHIL NFR BLD: 2.4 % (ref 0.5–7.8)
ERYTHROCYTE [DISTWIDTH] IN BLOOD BY AUTOMATED COUNT: 13.8 % (ref 11.9–14.6)
EST. AVERAGE GLUCOSE BLD GHB EST-MCNC: 198 MG/DL
GLOBULIN SER CALC-MCNC: 3 G/DL (ref 2.3–3.5)
GLUCOSE SERPL-MCNC: 169 MG/DL (ref 70–99)
HBA1C MFR BLD: 8.5 % (ref 0–5.6)
HCT VFR BLD AUTO: 40.9 % (ref 41.1–50.3)
HDLC SERPL-MCNC: 32 MG/DL (ref 40–60)
HDLC SERPL: 4.8 (ref 0–5)
HGB BLD-MCNC: 13.2 G/DL (ref 13.6–17.2)
IMM GRANULOCYTES # BLD AUTO: 0.05 K/UL (ref 0–0.5)
IMM GRANULOCYTES NFR BLD AUTO: 0.4 % (ref 0–5)
LDLC SERPL CALC-MCNC: 82 MG/DL (ref 0–100)
LYMPHOCYTES # BLD: 1.87 K/UL (ref 0.5–4.6)
LYMPHOCYTES NFR BLD: 15.2 % (ref 13–44)
MCH RBC QN AUTO: 28.6 PG (ref 26.1–32.9)
MCHC RBC AUTO-ENTMCNC: 32.3 G/DL (ref 31.4–35)
MCV RBC AUTO: 88.7 FL (ref 82–102)
MICROALBUMIN UR-MCNC: 1.2 MG/DL (ref 0–20)
MICROALBUMIN/CREAT UR-RTO: 13 MG/G (ref 0–30)
MONOCYTES # BLD: 0.64 K/UL (ref 0.1–1.3)
MONOCYTES NFR BLD: 5.2 % (ref 4–12)
NEUTS SEG # BLD: 9.38 K/UL (ref 1.7–8.2)
NEUTS SEG NFR BLD: 76.2 % (ref 43–78)
NRBC # BLD: 0 K/UL (ref 0–0.2)
PLATELET # BLD AUTO: 287 K/UL (ref 150–450)
PMV BLD AUTO: 9.8 FL (ref 9.4–12.3)
POTASSIUM SERPL-SCNC: 5.1 MMOL/L (ref 3.5–5.1)
PROT SERPL-MCNC: 6.7 G/DL (ref 6.3–8.2)
RBC # BLD AUTO: 4.61 M/UL (ref 4.23–5.6)
SODIUM SERPL-SCNC: 137 MMOL/L (ref 136–145)
TRIGL SERPL-MCNC: 188 MG/DL (ref 0–150)
VLDLC SERPL CALC-MCNC: 38 MG/DL (ref 6–23)
WBC # BLD AUTO: 12.3 K/UL (ref 4.3–11.1)

## 2025-02-21 ENCOUNTER — OFFICE VISIT (OUTPATIENT)
Dept: PRIMARY CARE CLINIC | Facility: CLINIC | Age: 75
End: 2025-02-21

## 2025-02-21 VITALS
BODY MASS INDEX: 23.45 KG/M2 | OXYGEN SATURATION: 95 % | DIASTOLIC BLOOD PRESSURE: 69 MMHG | HEIGHT: 72 IN | SYSTOLIC BLOOD PRESSURE: 132 MMHG | TEMPERATURE: 97.8 F | HEART RATE: 98 BPM | WEIGHT: 173.1 LBS

## 2025-02-21 DIAGNOSIS — F33.0 MILD EPISODE OF RECURRENT MAJOR DEPRESSIVE DISORDER: ICD-10-CM

## 2025-02-21 DIAGNOSIS — F51.01 PRIMARY INSOMNIA: ICD-10-CM

## 2025-02-21 DIAGNOSIS — N18.31 STAGE 3A CHRONIC KIDNEY DISEASE (HCC): ICD-10-CM

## 2025-02-21 DIAGNOSIS — E11.22 TYPE 2 DIABETES MELLITUS WITH STAGE 3A CHRONIC KIDNEY DISEASE, WITHOUT LONG-TERM CURRENT USE OF INSULIN (HCC): Primary | ICD-10-CM

## 2025-02-21 DIAGNOSIS — N18.31 TYPE 2 DIABETES MELLITUS WITH STAGE 3A CHRONIC KIDNEY DISEASE, WITHOUT LONG-TERM CURRENT USE OF INSULIN (HCC): Primary | ICD-10-CM

## 2025-02-21 DIAGNOSIS — Z79.899 ENCOUNTER FOR LONG-TERM (CURRENT) USE OF MEDICATIONS: ICD-10-CM

## 2025-02-21 DIAGNOSIS — E78.2 MIXED HYPERLIPIDEMIA: ICD-10-CM

## 2025-02-21 DIAGNOSIS — I15.0 RENOVASCULAR HYPERTENSION: ICD-10-CM

## 2025-02-21 RX ORDER — BLOOD SUGAR DIAGNOSTIC
1 STRIP MISCELLANEOUS DAILY
Qty: 100 EACH | Refills: 11 | Status: SHIPPED | OUTPATIENT
Start: 2025-02-21

## 2025-02-21 RX ORDER — LISINOPRIL 20 MG/1
20 TABLET ORAL DAILY
Qty: 90 TABLET | Refills: 3 | Status: SHIPPED | OUTPATIENT
Start: 2025-02-21

## 2025-02-21 RX ORDER — TRAZODONE HYDROCHLORIDE 50 MG/1
50 TABLET ORAL NIGHTLY
Qty: 90 TABLET | Refills: 3 | Status: SHIPPED | OUTPATIENT
Start: 2025-02-21

## 2025-02-21 RX ORDER — CITALOPRAM HYDROBROMIDE 20 MG/1
20 TABLET ORAL DAILY
Qty: 90 TABLET | Refills: 3 | Status: SHIPPED | OUTPATIENT
Start: 2025-02-21

## 2025-02-21 RX ORDER — BROMPHENIRAMINE MALEATE, PSEUDOEPHEDRINE HYDROCHLORIDE, AND DEXTROMETHORPHAN HYDROBROMIDE 2; 30; 10 MG/5ML; MG/5ML; MG/5ML
5 SYRUP ORAL 4 TIMES DAILY PRN
Qty: 120 ML | Refills: 1 | Status: SHIPPED | OUTPATIENT
Start: 2025-02-21

## 2025-02-21 RX ORDER — ATORVASTATIN CALCIUM 80 MG/1
80 TABLET, FILM COATED ORAL DAILY
Qty: 90 TABLET | Refills: 3 | Status: SHIPPED | OUTPATIENT
Start: 2025-02-21

## 2025-02-21 RX ORDER — AMLODIPINE BESYLATE 10 MG/1
TABLET ORAL
Qty: 90 TABLET | Refills: 3 | Status: SHIPPED | OUTPATIENT
Start: 2025-02-21

## 2025-02-21 RX ORDER — METHYLPREDNISOLONE 4 MG/1
TABLET ORAL
Qty: 1 KIT | Refills: 0 | Status: SHIPPED | OUTPATIENT
Start: 2025-02-21

## 2025-02-21 RX ORDER — TEMAZEPAM 15 MG/1
15 CAPSULE ORAL NIGHTLY PRN
Qty: 30 CAPSULE | Refills: 5 | Status: SHIPPED | OUTPATIENT
Start: 2025-02-21 | End: 2025-03-07

## 2025-02-21 RX ORDER — AZITHROMYCIN 250 MG/1
TABLET, FILM COATED ORAL
Qty: 6 TABLET | Refills: 0 | Status: SHIPPED | OUTPATIENT
Start: 2025-02-21 | End: 2025-03-03

## 2025-02-21 RX ORDER — SILDENAFIL CITRATE 20 MG/1
20 TABLET ORAL PRN
Qty: 90 TABLET | Refills: 3 | Status: SHIPPED | OUTPATIENT
Start: 2025-02-21

## 2025-02-21 RX ORDER — FENOFIBRATE 160 MG/1
160 TABLET ORAL DAILY
Qty: 90 TABLET | Refills: 3 | Status: SHIPPED | OUTPATIENT
Start: 2025-02-21

## 2025-02-21 SDOH — ECONOMIC STABILITY: FOOD INSECURITY: WITHIN THE PAST 12 MONTHS, THE FOOD YOU BOUGHT JUST DIDN'T LAST AND YOU DIDN'T HAVE MONEY TO GET MORE.: NEVER TRUE

## 2025-02-21 SDOH — ECONOMIC STABILITY: FOOD INSECURITY: WITHIN THE PAST 12 MONTHS, YOU WORRIED THAT YOUR FOOD WOULD RUN OUT BEFORE YOU GOT MONEY TO BUY MORE.: NEVER TRUE

## 2025-02-21 ASSESSMENT — PATIENT HEALTH QUESTIONNAIRE - PHQ9
6. FEELING BAD ABOUT YOURSELF - OR THAT YOU ARE A FAILURE OR HAVE LET YOURSELF OR YOUR FAMILY DOWN: NOT AT ALL
SUM OF ALL RESPONSES TO PHQ9 QUESTIONS 1 & 2: 2
5. POOR APPETITE OR OVEREATING: NOT AT ALL
SUM OF ALL RESPONSES TO PHQ QUESTIONS 1-9: 7
8. MOVING OR SPEAKING SO SLOWLY THAT OTHER PEOPLE COULD HAVE NOTICED. OR THE OPPOSITE, BEING SO FIGETY OR RESTLESS THAT YOU HAVE BEEN MOVING AROUND A LOT MORE THAN USUAL: NOT AT ALL
10. IF YOU CHECKED OFF ANY PROBLEMS, HOW DIFFICULT HAVE THESE PROBLEMS MADE IT FOR YOU TO DO YOUR WORK, TAKE CARE OF THINGS AT HOME, OR GET ALONG WITH OTHER PEOPLE: NOT DIFFICULT AT ALL
7. TROUBLE CONCENTRATING ON THINGS, SUCH AS READING THE NEWSPAPER OR WATCHING TELEVISION: NOT AT ALL
SUM OF ALL RESPONSES TO PHQ QUESTIONS 1-9: 7
1. LITTLE INTEREST OR PLEASURE IN DOING THINGS: SEVERAL DAYS
3. TROUBLE FALLING OR STAYING ASLEEP: NEARLY EVERY DAY
4. FEELING TIRED OR HAVING LITTLE ENERGY: MORE THAN HALF THE DAYS
2. FEELING DOWN, DEPRESSED OR HOPELESS: SEVERAL DAYS
9. THOUGHTS THAT YOU WOULD BE BETTER OFF DEAD, OR OF HURTING YOURSELF: NOT AT ALL

## 2025-02-21 NOTE — PROGRESS NOTES
do this?'     The patient also complains of a persistent, dry cough that has not responded to previous medication. He believes the cough may be exacerbated by stress.     Patient does not check blood pressure at home, Patient takes medications as prescribed. Denies chest pain, shortness of breath or headaches.    Patient states blood sugars have been labile.Patient checks her blood sugar once per day. Patient is compliant with medication use. Patient denies symptoms of neuropathy. Patient denies wounds that won't heal.    No other complaints. Taking medications as prescribed. Medications reviewed and updated.     HISTORY:  No Known Allergies    REVIEW OF SYSTEMS:  Review of systems is as indicated in HPI otherwise negative.    PHYSICAL EXAM:  Visit Vitals  /69   Pulse 98   Temp 97.8 °F (36.6 °C) (Temporal)   Ht 1.829 m (6')   Wt 78.5 kg (173 lb 1.6 oz)   SpO2 95%   BMI 23.48 kg/m²        Physical Exam  Vitals and nursing note reviewed.   Constitutional:       Appearance: Normal appearance.   HENT:      Head: Normocephalic and atraumatic.      Right Ear: Decreased hearing noted.      Left Ear: Decreased hearing noted.      Nose: Nose normal.      Mouth/Throat:      Mouth: Mucous membranes are moist.   Eyes:      Extraocular Movements: Extraocular movements intact.      Pupils: Pupils are equal, round, and reactive to light.   Cardiovascular:      Rate and Rhythm: Normal rate and regular rhythm.      Pulses: Normal pulses.      Heart sounds: Normal heart sounds.   Pulmonary:      Effort: Pulmonary effort is normal.      Breath sounds: Normal breath sounds.   Abdominal:      General: Abdomen is flat. Bowel sounds are normal.      Palpations: Abdomen is soft.   Musculoskeletal:         General: Normal range of motion.      Cervical back: Normal range of motion and neck supple.   Skin:     General: Skin is warm and dry.   Neurological:      General: No focal deficit present.      Mental Status: He is alert and

## 2025-02-25 ENCOUNTER — OFFICE VISIT (OUTPATIENT)
Age: 75
End: 2025-02-25
Payer: MEDICARE

## 2025-02-25 VITALS
HEIGHT: 72 IN | BODY MASS INDEX: 23.3 KG/M2 | SYSTOLIC BLOOD PRESSURE: 136 MMHG | DIASTOLIC BLOOD PRESSURE: 62 MMHG | WEIGHT: 172 LBS | HEART RATE: 94 BPM

## 2025-02-25 DIAGNOSIS — N18.31 STAGE 3A CHRONIC KIDNEY DISEASE (HCC): ICD-10-CM

## 2025-02-25 DIAGNOSIS — E78.2 MIXED HYPERLIPIDEMIA: ICD-10-CM

## 2025-02-25 DIAGNOSIS — I25.10 CORONARY ARTERY DISEASE INVOLVING NATIVE CORONARY ARTERY OF NATIVE HEART WITHOUT ANGINA PECTORIS: ICD-10-CM

## 2025-02-25 DIAGNOSIS — I10 ESSENTIAL HYPERTENSION: Primary | ICD-10-CM

## 2025-02-25 PROCEDURE — 1126F AMNT PAIN NOTED NONE PRSNT: CPT | Performed by: INTERNAL MEDICINE

## 2025-02-25 PROCEDURE — G8427 DOCREV CUR MEDS BY ELIG CLIN: HCPCS | Performed by: INTERNAL MEDICINE

## 2025-02-25 PROCEDURE — 93000 ELECTROCARDIOGRAM COMPLETE: CPT | Performed by: INTERNAL MEDICINE

## 2025-02-25 PROCEDURE — 1123F ACP DISCUSS/DSCN MKR DOCD: CPT | Performed by: INTERNAL MEDICINE

## 2025-02-25 PROCEDURE — 3017F COLORECTAL CA SCREEN DOC REV: CPT | Performed by: INTERNAL MEDICINE

## 2025-02-25 PROCEDURE — 4004F PT TOBACCO SCREEN RCVD TLK: CPT | Performed by: INTERNAL MEDICINE

## 2025-02-25 PROCEDURE — G8420 CALC BMI NORM PARAMETERS: HCPCS | Performed by: INTERNAL MEDICINE

## 2025-02-25 PROCEDURE — 3078F DIAST BP <80 MM HG: CPT | Performed by: INTERNAL MEDICINE

## 2025-02-25 PROCEDURE — 3075F SYST BP GE 130 - 139MM HG: CPT | Performed by: INTERNAL MEDICINE

## 2025-02-25 PROCEDURE — 99214 OFFICE O/P EST MOD 30 MIN: CPT | Performed by: INTERNAL MEDICINE

## 2025-02-25 PROCEDURE — 1159F MED LIST DOCD IN RCRD: CPT | Performed by: INTERNAL MEDICINE

## 2025-02-25 NOTE — PROGRESS NOTES
[address] [facility], [address] [place] [address]  PHONE: [phone]     25    NAME:  Tommy Granger  : 1950  MRN: [id number]       SUBJECTIVE:   Tommy Granger is a 75 y.o. male seen for a follow up visit regarding the following:     Chief Complaint   Patient presents with    Coronary Artery Disease    Hypertension    Follow-up       HPI: He has CAD, prior PCI yrs and yrs ago in [place].    Echo 2023: normal EF  NST 2023:  low risk of myocardial ischemia.        No CP, angina, playing golf.   Moved his 89yo mother into his home, this has been stressful.  Active, no angina.  No palp, edema. no new WHITE, edema.    No CP.    Patient denies recent history of orthopnea, PND, excessive dizziness and/or syncope.        Ofelia Granger, his mother, is my patients.          Past Medical History, Past Surgical History, Family history, Social History, and Medications were all reviewed with the patient today and updated as necessary.     Current Outpatient Medications   Medication Sig Dispense Refill    traZODone (DESYREL) 50 MG tablet Take 1 tablet by mouth nightly Take 50 mg by mouth 90 tablet 3    empagliflozin (JARDIANCE) 10 MG tablet Take 1 tablet by mouth daily For diabetes 90 tablet 3    amLODIPine (NORVASC) 10 MG tablet Take 1 tablet by mouth once daily 90 tablet 3    fenofibrate 160 MG tablet Take 1 tablet by mouth daily 90 tablet 3    lisinopril (PRINIVIL;ZESTRIL) 20 MG tablet Take 1 tablet by mouth daily 90 tablet 3    blood glucose test strips (ONETOUCH ULTRA) strip 1 each by In Vitro route daily DX: E11.22 100 each 11    atorvastatin (LIPITOR) 80 MG tablet Take 1 tablet by mouth daily 90 tablet 3    citalopram (CELEXA) 20 MG tablet Take 1 tablet by mouth daily 90 tablet 3    sildenafil (REVATIO) 20 MG tablet Take 1 tablet by mouth as needed (take 1 hour before intercourse) 90 tablet 3    metFORMIN (GLUCOPHAGE) 1000 MG tablet Take 1 tablet by mouth Daily with supper 90 tablet 3

## 2025-03-05 ENCOUNTER — TELEPHONE (OUTPATIENT)
Age: 75
End: 2025-03-05

## 2025-03-05 NOTE — TELEPHONE ENCOUNTER
Feeling progressively worse over past few weeks with increasing symptoms since he saw Dr. Martinez on 2/25/26.Wife, Johanna, states she has \"gut feeling\" that something is significantly wrong with patient.  Getting much weaker with no energy and increasing SOB on exertion.  So weak and SOB that he shuffles feet when walking and \"can't hardly get up stairs\"  Complains of \"a little chest pain\" with increased exertion, like when running chainsaw, that goes away when he rests. No current chest pain.   Not eating very much. Has lost 35-40 lbs in past month.   Very pale.   No LE edema.   Glucose-180.   Has not been checking BP or HR at home.   Saw urgent care on 3/3/25 and was told all tests, including flu, COVID, and RSV were normal.   Saw PCP for routine appointment on 2/21/25.   Under great deal of stress with patient's mother living in patient's home under hospice care. Has been told that mother may only live 30-60 days.   Taking amlodipine 10 mg qd, ASA 81 mg qd, Lipitor 80 mg qd, Jardiance 10 mg qd, and lisinopril 20 mg qd.    Johanna asks for Dr. Martinez's recommendations for increasing weakness, SOB, and other symptoms with 35-40 lb weight loss.

## 2025-03-05 NOTE — TELEPHONE ENCOUNTER
Patient for the last 2 weeks has been getting worse . SOB, and can't catch his breath going up stairs.  He is not eating and is losing weight.  No energy..

## 2025-03-05 NOTE — TELEPHONE ENCOUNTER
35pd weight loss in 4 weeks, needs to see PCP ASAP re possible cancer.   See me back after PCP if needed.  But, I would see PCP first ASAP.   Thanks

## 2025-03-05 NOTE — TELEPHONE ENCOUNTER
Advised Johanna of Dr. Martinez's response. Advised Johanna to call with any further questions or concerns. Johanna verbalized understanding.

## 2025-03-06 ENCOUNTER — HOSPITAL ENCOUNTER (OUTPATIENT)
Dept: CT IMAGING | Age: 75
Discharge: HOME | End: 2025-03-08
Payer: MEDICARE

## 2025-03-06 ENCOUNTER — OFFICE VISIT (OUTPATIENT)
Dept: PRIMARY CARE CLINIC | Facility: CLINIC | Age: 75
End: 2025-03-06

## 2025-03-06 ENCOUNTER — CLINICAL DOCUMENTATION (OUTPATIENT)
Dept: PRIMARY CARE CLINIC | Facility: CLINIC | Age: 75
End: 2025-03-06

## 2025-03-06 VITALS
OXYGEN SATURATION: 94 % | SYSTOLIC BLOOD PRESSURE: 110 MMHG | DIASTOLIC BLOOD PRESSURE: 64 MMHG | TEMPERATURE: 98.1 F | BODY MASS INDEX: 23.06 KG/M2 | WEIGHT: 170 LBS | HEART RATE: 99 BPM

## 2025-03-06 DIAGNOSIS — Z13.0 SCREENING FOR DEFICIENCY ANEMIA: ICD-10-CM

## 2025-03-06 DIAGNOSIS — R79.0 LOW MAGNESIUM LEVEL: ICD-10-CM

## 2025-03-06 DIAGNOSIS — Z13.0 SCREENING FOR IRON DEFICIENCY ANEMIA: ICD-10-CM

## 2025-03-06 DIAGNOSIS — F51.01 PRIMARY INSOMNIA: ICD-10-CM

## 2025-03-06 DIAGNOSIS — R63.4 WEIGHT LOSS: ICD-10-CM

## 2025-03-06 DIAGNOSIS — R11.0 NAUSEA: ICD-10-CM

## 2025-03-06 DIAGNOSIS — D64.9 ANEMIA, UNSPECIFIED TYPE: ICD-10-CM

## 2025-03-06 DIAGNOSIS — R06.02 SOB (SHORTNESS OF BREATH): ICD-10-CM

## 2025-03-06 DIAGNOSIS — R53.83 OTHER FATIGUE: ICD-10-CM

## 2025-03-06 DIAGNOSIS — R53.82 CHRONIC FATIGUE: ICD-10-CM

## 2025-03-06 DIAGNOSIS — Z12.5 ENCOUNTER FOR SCREENING PROSTATE SPECIFIC ANTIGEN (PSA) MEASUREMENT: ICD-10-CM

## 2025-03-06 DIAGNOSIS — R63.0 LOSS OF APPETITE: ICD-10-CM

## 2025-03-06 DIAGNOSIS — R91.8 LUNG MASS: Primary | ICD-10-CM

## 2025-03-06 DIAGNOSIS — R59.9 ADENOPATHY: ICD-10-CM

## 2025-03-06 DIAGNOSIS — D64.9 ANEMIA, UNSPECIFIED TYPE: Primary | ICD-10-CM

## 2025-03-06 LAB
BILIRUBIN, URINE, POC: NEGATIVE
BLOOD URINE, POC: NEGATIVE
CORTIS AM PEAK SERPL-MCNC: 25 UG/DL (ref 4.8–19.5)
FERRITIN SERPL-MCNC: 262 NG/ML (ref 8–388)
FOLATE SERPL-MCNC: 21.2 NG/ML (ref 3.1–17.5)
GLUCOSE URINE, POC: 500
IRON SATN MFR SERPL: 8 % (ref 20–50)
IRON SERPL-MCNC: 23 UG/DL (ref 35–100)
KETONES, URINE, POC: NEGATIVE
LEUKOCYTE ESTERASE, URINE, POC: NEGATIVE
MAGNESIUM SERPL-MCNC: 2.2 MG/DL (ref 1.8–2.4)
NITRITE, URINE, POC: NEGATIVE
PH, URINE, POC: 7 (ref 4.6–8)
PROTEIN,URINE, POC: NEGATIVE
PSA SERPL-MCNC: 4.1 NG/ML (ref 0–4)
SPECIFIC GRAVITY, URINE, POC: 1.01 (ref 1–1.03)
TIBC SERPL-MCNC: 289 UG/DL (ref 240–450)
UIBC SERPL-MCNC: 266 UG/DL (ref 112–347)
URINALYSIS CLARITY, POC: CLEAR
URINALYSIS COLOR, POC: YELLOW
UROBILINOGEN, POC: NORMAL
VIT B12 SERPL-MCNC: 459 PG/ML (ref 193–986)

## 2025-03-06 PROCEDURE — 74177 CT ABD & PELVIS W/CONTRAST: CPT

## 2025-03-06 PROCEDURE — 6360000004 HC RX CONTRAST MEDICATION: Performed by: NURSE PRACTITIONER

## 2025-03-06 RX ORDER — DIATRIZOATE MEGLUMINE AND DIATRIZOATE SODIUM 660; 100 MG/ML; MG/ML
15 SOLUTION ORAL; RECTAL
Status: DISCONTINUED | OUTPATIENT
Start: 2025-03-06 | End: 2025-03-09 | Stop reason: HOSPADM

## 2025-03-06 RX ORDER — IOPAMIDOL 755 MG/ML
100 INJECTION, SOLUTION INTRAVASCULAR
Status: COMPLETED | OUTPATIENT
Start: 2025-03-06 | End: 2025-03-06

## 2025-03-06 RX ADMIN — IOPAMIDOL 100 ML: 755 INJECTION, SOLUTION INTRAVENOUS at 13:59

## 2025-03-06 RX ADMIN — DIATRIZOATE MEGLUMINE AND DIATRIZOATE SODIUM 15 ML: 660; 100 LIQUID ORAL; RECTAL at 14:01

## 2025-03-06 ASSESSMENT — ENCOUNTER SYMPTOMS
EYES NEGATIVE: 1
ABDOMINAL PAIN: 0
NAUSEA: 1
ABDOMINAL DISTENTION: 0
SHORTNESS OF BREATH: 1
ALLERGIC/IMMUNOLOGIC NEGATIVE: 1

## 2025-03-06 NOTE — PROGRESS NOTES
seen.  - GALLBLADDER/BILE DUCTS: Gallbladder is unremarkable. No biliary dilatation.  - PANCREAS: Normal.  - SPLEEN: Normal.     - ADRENALS:  Normal.  - KIDNEYS/URETERS: 8 mm right renal cyst. 3 mm nonobstructing left renal stone.  Kidneys are otherwise unremarkable.  - BLADDER: Normal.  - REPRODUCTIVE ORGANS: Prostatomegaly, 5.9 cm     - BOWEL: Colonic diverticulosis without diverticulitis. No bowel obstruction or  enterocolitis.  - LYMPH NODES: No significant retroperitoneal, mesenteric, or pelvic adenopathy.  - BONES: No fracture or significant bone lesion.  - VASCULATURE: Severe aortic plaque without aneurysm or dissection.  - OTHER: No ascites.        IMPRESSION:     1. Large left lower lobe malignancy with extensive metastatic adenopathy  throughout the chest. Numerous lung metastases involving all pulmonary lobes.  2. Basilar interstitial edema with small left pleural effusion.  3. No metastatic disease in the abdomen or pelvis.

## 2025-03-06 NOTE — PROGRESS NOTES
utilized during this visit to record, process the conversation to generate a clinical note, and support improvement of the AI technology. The patient (or guardian, if applicable) and other individuals in attendance at the appointment consented to the use of AI, including the recording.      Attestation

## 2025-03-07 LAB — ACTH PLAS-MCNC: 7.6 PG/ML (ref 7.2–63.3)

## 2025-03-07 NOTE — RESULT ENCOUNTER NOTE
Please let the patient know:    ACTH is normal    MyChart message sent as well    Explanatory note: Be assured that the information provided to create your medical record comes from your provider. The written transcription portion of this note is prepared electronically by voice-recognition software. At times, there may be some errors in capitalization, punctuation, tense, or context that are inherent in the system, but your provider reviews the note for content and to ensure that the note contains appropriate information for your continuing care.  
Your specialists will guide further evaluation and treatment options.  Monitor for anemia symptoms - If you experience worsening fatigue, dizziness, or shortness of breath, let us know.  Continue all current medications as prescribed.  Watch for any new symptoms and keep us updated on any changes in your health.    [employer] message sent as well    Explanatory note: Be assured that the information provided to create your medical record comes from your provider. The written transcription portion of this note is prepared electronically by voice-recognition software. At times, there may be some errors in capitalization, punctuation, tense, or context that are inherent in the system, but your provider reviews the note for content and to ensure that the note contains appropriate information for your continuing care.

## (undated) DEVICE — BUTTON SWITCH PENCIL BLADE ELECTRODE, HOLSTER: Brand: EDGE

## (undated) DEVICE — SOLUTION IRRIG 1000ML 0.9% SOD CHL USP POUR PLAS BTL

## (undated) DEVICE — KIT PROCEDURE SURG HEAD AND NECK TOTE

## (undated) DEVICE — TRAY PREP DRY W/ PREM GLV 2 APPL 6 SPNG 2 UNDPD 1 OVERWRAP

## (undated) DEVICE — SUT PROL 2-0 30IN CT2 BLU --

## (undated) DEVICE — DERMABOND SKIN ADH 0.7ML -- DERMABOND ADVANCED 12/BX

## (undated) DEVICE — SYRINGE, LUER SLIP, STERILE, 60ML: Brand: MEDLINE

## (undated) DEVICE — CONNECTOR TBNG OD5-7MM O2 END DISP

## (undated) DEVICE — NEEDLE SYR 18GA L1.5IN RED PLAS HUB S STL BLNT FILL W/O

## (undated) DEVICE — TRAP SPEC POLYP REM STRNR CLN DSGN MAGNIFYING WIND DISP

## (undated) DEVICE — Device

## (undated) DEVICE — 3M™ TEGADERM™ TRANSPARENT FILM DRESSING FRAME STYLE, 1626W, 4 IN X 4-3/4 IN (10 CM X 12 CM), 50/CT 4CT/CASE: Brand: 3M™ TEGADERM™

## (undated) DEVICE — BLADE ASSEMB CLP HAIR FINE --

## (undated) DEVICE — SYR LR LCK 1ML GRAD NSAF 30ML --

## (undated) DEVICE — DRAPE,TOP,102X53,STERILE: Brand: MEDLINE

## (undated) DEVICE — NASAL PACKING CS3600-10 NOVAPAK 10PK STD: Brand: NOVAPAK

## (undated) DEVICE — (D)PREP SKN CHLRAPRP APPL 26ML -- CONVERT TO ITEM 371833

## (undated) DEVICE — AIRLIFE™ OXYGEN TUBING 7 FEET (2.1 M) CRUSH RESISTANT OXYGEN TUBING, VINYL TIPPED: Brand: AIRLIFE™

## (undated) DEVICE — AMD ANTIMICROBIAL GAUZE SPONGES,12 PLY USP TYPE VII, 0.2% POLYHEXAMETHYLENE BIGUANIDE HCI (PHMB): Brand: CURITY

## (undated) DEVICE — GAUZE,SPONGE,4"X4",12PLY,WOVEN,NS,LF: Brand: MEDLINE

## (undated) DEVICE — TURP TURB: Brand: MEDLINE INDUSTRIES, INC.

## (undated) DEVICE — GLOVE ORANGE PI 7 1/2   MSG9075

## (undated) DEVICE — [HIGH FLOW INSUFFLATOR,  DO NOT USE IF PACKAGE IS DAMAGED,  KEEP DRY,  KEEP AWAY FROM SUNLIGHT,  PROTECT FROM HEAT AND RADIOACTIVE SOURCES.]: Brand: PNEUMOSURE

## (undated) DEVICE — SHEET, DRAPE, SPLIT, STERILE: Brand: MEDLINE

## (undated) DEVICE — INSUFFLATION NEEDLE: Brand: SURGINEEDLE

## (undated) DEVICE — (D)STRIP SKN CLSR 0.5X4IN WHT --

## (undated) DEVICE — KIT,ANTI FOG,W/SPONGE & FLUID,SOFT PACK: Brand: MEDLINE

## (undated) DEVICE — SINGLE PORT MANIFOLD: Brand: NEPTUNE 2

## (undated) DEVICE — STAPLER ENDOSCP 5MM ABS FIX DEV W/ 30 TACKS DISP

## (undated) DEVICE — ELECTRODE PT RET AD L9FT HI MOIST COND ADH HYDRGEL CORDED

## (undated) DEVICE — SOL ANTI-FOG 6ML MEDC -- MEDICHOICE - CONVERT TO 358427

## (undated) DEVICE — BAG,DRAINAGE,4L,A/R TOWER,LL,SLIDE TAP: Brand: MEDLINE

## (undated) DEVICE — PENROSE DRAIN 12" X 1/4: Brand: CARDINAL HEALTH

## (undated) DEVICE — KIT DISECT BLNT TIP TRCR W/ OVL BLLN SPCMKR PRO

## (undated) DEVICE — SPONGE,NEURO,0.5"X3",XR,STRL,LF,10/PK: Brand: MEDLINE

## (undated) DEVICE — LAP CHOLE: Brand: MEDLINE INDUSTRIES, INC.

## (undated) DEVICE — LUBE JELLY FOIL PACK 1.4 OZ: Brand: MEDLINE INDUSTRIES, INC.

## (undated) DEVICE — SOLUTION IV 1000ML LAC RINGERS PH 6.5 INJ USP VIAFLX PLAS

## (undated) DEVICE — TUBING, SUCTION, 1/4" X 10', STRAIGHT: Brand: MEDLINE

## (undated) DEVICE — CYSTO/BLADDER IRRIGATION SET, REGULATING CLAMP

## (undated) DEVICE — 2000CC GUARDIAN II: Brand: GUARDIAN

## (undated) DEVICE — SUTURE VCRL SZ 3-0 L27IN ABSRB UD L26MM SH 1/2 CIR J416H

## (undated) DEVICE — DEVICE SECUREMENT 1/32IN POLYETH FOAM F ANCHR URIN CATH

## (undated) DEVICE — BLADE 1884080EM TRICUT 4MMX13CM M4 ROHS: Brand: FUSION®

## (undated) DEVICE — JELLY LUBRICATING 10GM PREFIL SYR LUBE

## (undated) DEVICE — SUT VCRL + 2-0 27IN CT2 VIO --

## (undated) DEVICE — SUTURE CHROMIC GUT SZ 3-0 L27IN ABSRB BRN L26MM SH 1/2 CIR G122H

## (undated) DEVICE — SUTURE PROL SZ 0 L30IN NONABSORBABLE BLU L26MM CT-2 1/2 CIR 8412H

## (undated) DEVICE — BLADELESS OPTICAL TROCAR WITH FIXATION CANNULA: Brand: VERSAPORT

## (undated) DEVICE — GARMENT,MEDLINE,DVT,INT,CALF,MED, GEN2: Brand: MEDLINE

## (undated) DEVICE — SYRINGE MED 10ML LUERLOCK TIP W/O SFTY DISP

## (undated) DEVICE — SYRINGE,TOOMEY,IRRIGATION,70CC,STERILE: Brand: MEDLINE

## (undated) DEVICE — REM POLYHESIVE ADULT PATIENT RETURN ELECTRODE: Brand: VALLEYLAB

## (undated) DEVICE — ELECTRODE ELECSURG LOOP 30 DEG MED QUIK FIRE

## (undated) DEVICE — CANISTER, RIGID, 2000CC: Brand: MEDLINE INDUSTRIES, INC.

## (undated) DEVICE — SOLUTION IV 1000ML 0.9% SOD CHL

## (undated) DEVICE — BLADE 1884006HR RAD40 5PK M4 4MM ROTATE: Brand: RAD

## (undated) DEVICE — SUTURE VCRL SZ 3-0 L18IN ABSRB UD PS-2 L19MM 3/8 CRV PRIM J497H

## (undated) DEVICE — SYRINGE NDL 25GA 1ML L5/8IN BLU PLAS NDL S STL SHLD HYPO

## (undated) DEVICE — SYRINGE MEDICAL 3ML CLEAR PLASTIC STANDARD NON CONTROL LUERLOCK TIP DISPOSABLE

## (undated) DEVICE — SOLUTION IRRIG 3000ML H2O STRL BAG

## (undated) DEVICE — CATHETER URETH 24FR BLLN 30CC STD LTX 3 W TWO OPP DRNGE EYE

## (undated) DEVICE — ENDOSCOPIC KIT 1.1+ OP4 CA DE 2 GWN AAMI LEVEL 3

## (undated) DEVICE — NEEDLE HYPO 21GA L1.5IN INTRAMUSCULAR S STL LATCH BVL UP

## (undated) DEVICE — KENDALL RADIOLUCENT FOAM MONITORING ELECTRODE RECTANGULAR SHAPE: Brand: KENDALL

## (undated) DEVICE — SUTURE MCRYL SZ 4-0 L27IN ABSRB UD L19MM PS-2 1/2 CIR PRIM Y426H

## (undated) DEVICE — SURGICAL PROCEDURE PACK BASIC ST FRANCIS

## (undated) DEVICE — SUTURE VCRL SZ 0 L27IN ABSRB UD L26MM CT-2 1/2 CIR J270H

## (undated) DEVICE — DUAL LUMEN STOMACH TUBE: Brand: SALEM SUMP

## (undated) DEVICE — CONTAINER SPEC 4 OZ CAP SEAL POLYPR TRNSLUC BLU STRL

## (undated) DEVICE — NEEDLE HYPO 25GA L1.5IN BLU POLYPR HUB S STL REG BVL STR

## (undated) DEVICE — GARMENT,MEDLINE,DVT,INT,CALF,LG, GEN2: Brand: MEDLINE

## (undated) DEVICE — SUTURE SZ 0 27IN 5/8 CIR UR-6  TAPER PT VIOLET ABSRB VICRYL J603H

## (undated) DEVICE — ELECTROSURGICAL SUCTION COAGULATOR 10FR

## (undated) DEVICE — CANNULA NSL ORAL AD FOR CAPNOFLEX CO2 O2 AIRLFE

## (undated) DEVICE — PACK SURGICAL PROCEDURE KIT CYSTOSCOPY TOTE

## (undated) DEVICE — TUBING 1895522 5PK STRAIGHTSHOT TO XPS: Brand: STRAIGHTSHOT®

## (undated) DEVICE — SUTURE ETHLN SZ 2-0 L18IN NONABSORBABLE BLK L26MM PS 3/8 585H

## (undated) DEVICE — SOLUTION IRRIG 1000ML 09% SOD CHL USP PIC PLAS CONTAINER

## (undated) DEVICE — INTENDED FOR TISSUE SEPARATION, AND OTHER PROCEDURES THAT REQUIRE A SHARP SURGICAL BLADE TO PUNCTURE OR CUT.: Brand: BARD-PARKER SAFETY BLADES SIZE 11, STERILE

## (undated) DEVICE — SNARE VASC L240CM LOOP W10MM SHTH DIA2.4MM RND STIFF CLD

## (undated) DEVICE — SOLUTION IRRIG 3000ML 0.9% SOD CHL FLX CONT 0797208] ICU MEDICAL INC]

## (undated) DEVICE — SYRINGE MED 50ML LUERLOCK TIP

## (undated) DEVICE — KENDALL SCD EXPRESS SLEEVES, KNEE LENGTH, MEDIUM: Brand: KENDALL SCD